# Patient Record
Sex: MALE | Race: WHITE | NOT HISPANIC OR LATINO | Employment: FULL TIME | URBAN - METROPOLITAN AREA
[De-identification: names, ages, dates, MRNs, and addresses within clinical notes are randomized per-mention and may not be internally consistent; named-entity substitution may affect disease eponyms.]

---

## 2017-01-05 ENCOUNTER — HISTORICAL (OUTPATIENT)
Dept: LAB | Facility: HOSPITAL | Age: 52
End: 2017-01-05

## 2017-02-02 ENCOUNTER — HISTORICAL (OUTPATIENT)
Dept: LAB | Facility: HOSPITAL | Age: 52
End: 2017-02-02

## 2017-02-09 ENCOUNTER — HISTORICAL (OUTPATIENT)
Dept: LAB | Facility: HOSPITAL | Age: 52
End: 2017-02-09

## 2017-03-02 ENCOUNTER — HISTORICAL (OUTPATIENT)
Dept: LAB | Facility: HOSPITAL | Age: 52
End: 2017-03-02

## 2017-04-06 ENCOUNTER — HISTORICAL (OUTPATIENT)
Dept: LAB | Facility: HOSPITAL | Age: 52
End: 2017-04-06

## 2017-05-04 ENCOUNTER — HISTORICAL (OUTPATIENT)
Dept: LAB | Facility: HOSPITAL | Age: 52
End: 2017-05-04

## 2017-05-04 LAB
INR PPP: 2.14
PROTHROMBIN TIME: 21.8 SECOND(S) (ref 9–11.5)

## 2017-06-01 ENCOUNTER — HISTORICAL (OUTPATIENT)
Dept: LAB | Facility: HOSPITAL | Age: 52
End: 2017-06-01

## 2017-06-01 LAB
INR PPP: 2.45
PROTHROMBIN TIME: 24.9 SECOND(S) (ref 9–11.5)

## 2017-07-06 ENCOUNTER — HISTORICAL (OUTPATIENT)
Dept: LAB | Facility: HOSPITAL | Age: 52
End: 2017-07-06

## 2017-07-06 LAB
INR PPP: 2.29
PROTHROMBIN TIME: 23.3 SECOND(S) (ref 9–11.5)

## 2017-08-03 ENCOUNTER — HISTORICAL (OUTPATIENT)
Dept: LAB | Facility: HOSPITAL | Age: 52
End: 2017-08-03

## 2017-08-03 LAB
INR PPP: 2.68
PROTHROMBIN TIME: 27.4 SECOND(S) (ref 9–11.5)

## 2017-09-07 ENCOUNTER — HISTORICAL (OUTPATIENT)
Dept: LAB | Facility: HOSPITAL | Age: 52
End: 2017-09-07

## 2017-09-07 LAB
INR PPP: 2.59
PROTHROMBIN TIME: 26.6 SECOND(S) (ref 9–11.5)

## 2017-10-05 ENCOUNTER — HISTORICAL (OUTPATIENT)
Dept: LAB | Facility: HOSPITAL | Age: 52
End: 2017-10-05

## 2017-10-05 LAB
INR PPP: 3.21
PROTHROMBIN TIME: 33.1 SECOND(S) (ref 9–11.5)

## 2017-11-02 ENCOUNTER — HISTORICAL (OUTPATIENT)
Dept: LAB | Facility: HOSPITAL | Age: 52
End: 2017-11-02

## 2017-11-02 LAB
INR PPP: 2.54
PROTHROMBIN TIME: 26.1 SECOND(S) (ref 9–11.5)

## 2017-12-07 ENCOUNTER — HISTORICAL (OUTPATIENT)
Dept: LAB | Facility: HOSPITAL | Age: 52
End: 2017-12-07

## 2017-12-07 LAB
INR PPP: 2.4
PROTHROMBIN TIME: 24.2 SECOND(S) (ref 9–11.5)

## 2018-01-04 ENCOUNTER — HISTORICAL (OUTPATIENT)
Dept: LAB | Facility: HOSPITAL | Age: 53
End: 2018-01-04

## 2018-01-04 LAB
INR PPP: 2.82
PROTHROMBIN TIME: 28.5 SECOND(S) (ref 9–11.5)

## 2018-02-01 ENCOUNTER — HISTORICAL (OUTPATIENT)
Dept: LAB | Facility: HOSPITAL | Age: 53
End: 2018-02-01

## 2018-02-01 LAB
INR PPP: 2.66
PROTHROMBIN TIME: 26.9 SECOND(S) (ref 9–11.5)

## 2018-03-01 ENCOUNTER — HISTORICAL (OUTPATIENT)
Dept: LAB | Facility: HOSPITAL | Age: 53
End: 2018-03-01

## 2018-03-01 LAB
INR PPP: 1.65
PROTHROMBIN TIME: 16.7 SECOND(S) (ref 9–11.5)

## 2018-03-15 ENCOUNTER — HISTORICAL (OUTPATIENT)
Dept: LAB | Facility: HOSPITAL | Age: 53
End: 2018-03-15

## 2018-03-15 LAB
INR PPP: 2.6
PROTHROMBIN TIME: 26.3 SECOND(S) (ref 9–11.5)

## 2018-04-19 ENCOUNTER — HISTORICAL (OUTPATIENT)
Dept: LAB | Facility: HOSPITAL | Age: 53
End: 2018-04-19

## 2018-04-19 LAB
INR PPP: 2.22
PROTHROMBIN TIME: 22.4 SECOND(S) (ref 9.3–11.4)

## 2018-05-17 ENCOUNTER — HISTORICAL (OUTPATIENT)
Dept: LAB | Facility: HOSPITAL | Age: 53
End: 2018-05-17

## 2018-05-17 LAB
INR PPP: 2.6
PROTHROMBIN TIME: 26.7 SECOND(S) (ref 9.3–11.4)

## 2018-06-21 ENCOUNTER — HISTORICAL (OUTPATIENT)
Dept: LAB | Facility: HOSPITAL | Age: 53
End: 2018-06-21

## 2018-06-21 LAB
INR PPP: 2.4
PROTHROMBIN TIME: 24.6 SECOND(S) (ref 9.3–11.4)

## 2018-07-09 ENCOUNTER — HISTORICAL (OUTPATIENT)
Dept: LAB | Facility: HOSPITAL | Age: 53
End: 2018-07-09

## 2018-07-09 LAB
ABS NEUT (OLG): 3.93
ALBUMIN SERPL-MCNC: 3.9 GM/DL (ref 3.4–5)
ALBUMIN/GLOB SERPL: 1 RATIO (ref 1.1–2)
ALP SERPL-CCNC: 77 UNIT/L (ref 46–116)
ALT SERPL-CCNC: 42 UNIT/L (ref 12–78)
AST SERPL-CCNC: 32 UNIT/L (ref 10–37)
BASOPHILS # BLD AUTO: 0.07 X10(3)/MCL
BASOPHILS NFR BLD AUTO: 0.9 %
BILIRUB SERPL-MCNC: 0.4 MG/DL (ref 0.2–1)
BILIRUBIN DIRECT+TOT PNL SERPL-MCNC: 0.14 MG/DL (ref 0–0.2)
BILIRUBIN DIRECT+TOT PNL SERPL-MCNC: 0.26 MG/DL
BUN SERPL-MCNC: 19 MG/DL (ref 7–18)
CALCIUM SERPL-MCNC: 9 MG/DL (ref 8.5–10.1)
CHLORIDE SERPL-SCNC: 107 MMOL/L (ref 98–107)
CHOLEST SERPL-MCNC: 167 MG/DL (ref 50–200)
CHOLEST/HDLC SERPL: 4 {RATIO} (ref 0–5)
CO2 SERPL-SCNC: 24.5 MMOL/L (ref 21–32)
CREAT SERPL-MCNC: 0.9 MG/DL (ref 0.7–1.3)
EOSINOPHIL # BLD AUTO: 0.38 X10(3)/MCL
EOSINOPHIL NFR BLD AUTO: 4.9 %
ERYTHROCYTE [DISTWIDTH] IN BLOOD BY AUTOMATED COUNT: 13 %
GLOBULIN SER-MCNC: 4.1 GM/DL (ref 2.4–3.5)
GLUCOSE SERPL-MCNC: 106 MG/DL (ref 74–106)
HCT VFR BLD AUTO: 46.3 % (ref 39–49)
HDLC SERPL-MCNC: 43 MG/DL (ref 35–60)
HGB BLD-MCNC: 16 GM/DL (ref 12.6–16.6)
IMM GRANULOCYTES # BLD AUTO: 0.01 10*3/UL (ref 0–0.1)
IMM GRANULOCYTES NFR BLD AUTO: 0.1 % (ref 0–1)
LDLC SERPL CALC-MCNC: 106 MG/DL (ref 50–140)
LYMPHOCYTES # BLD AUTO: 2.64 X10(3)/MCL
LYMPHOCYTES NFR BLD AUTO: 34.1 %
MCH RBC QN AUTO: 32.2 PG (ref 27–33)
MCHC RBC AUTO-ENTMCNC: 34.6 GM/DL (ref 32–35)
MCV RBC AUTO: 93.2 FL (ref 84–97)
MONOCYTES # BLD AUTO: 0.72 X10(3)/MCL
MONOCYTES NFR BLD AUTO: 9.3 %
NEUTROPHILS # BLD AUTO: 3.93 X10(3)/MCL
NEUTROPHILS NFR BLD AUTO: 50.7 %
PLATELET # BLD AUTO: 220 X10(3)/MCL (ref 151–368)
PMV BLD AUTO: 9 FL
POTASSIUM SERPL-SCNC: 4.6 MMOL/L (ref 3.5–5.1)
PROT SERPL-MCNC: 8 GM/DL (ref 6.4–8.2)
PSA SERPL-MCNC: 0.49 NG/ML (ref 0–4)
RBC # BLD AUTO: 4.97 X10(6)/MCL (ref 4.3–5.6)
SODIUM SERPL-SCNC: 142 MMOL/L (ref 136–145)
TRIGL SERPL-MCNC: 91 MG/DL (ref 30–150)
TSH SERPL-ACNC: 2.5 MIU/ML (ref 0.35–3.75)
VLDLC SERPL CALC-MCNC: 18 MG/DL
WBC # SPEC AUTO: 7.75 X10(3)/MCL (ref 3.4–9.2)

## 2018-07-19 ENCOUNTER — HISTORICAL (OUTPATIENT)
Dept: LAB | Facility: HOSPITAL | Age: 53
End: 2018-07-19

## 2018-07-19 LAB
INR PPP: 2.3
PROTHROMBIN TIME: 23.5 SECOND(S) (ref 9.3–11.4)

## 2018-08-16 ENCOUNTER — HISTORICAL (OUTPATIENT)
Dept: LAB | Facility: HOSPITAL | Age: 53
End: 2018-08-16

## 2018-08-16 LAB
INR PPP: 2.4
PROTHROMBIN TIME: 24.7 SECOND(S) (ref 9.3–11.4)

## 2018-09-20 ENCOUNTER — HISTORICAL (OUTPATIENT)
Dept: LAB | Facility: HOSPITAL | Age: 53
End: 2018-09-20

## 2018-09-20 LAB
INR PPP: 1.9
PROTHROMBIN TIME: 19.5 SECOND(S) (ref 9.3–11.4)

## 2018-09-27 ENCOUNTER — HISTORICAL (OUTPATIENT)
Dept: LAB | Facility: HOSPITAL | Age: 53
End: 2018-09-27

## 2018-09-27 LAB
INR PPP: 1.9
PROTHROMBIN TIME: 19.5 SECOND(S) (ref 9.3–11.4)

## 2018-10-11 ENCOUNTER — HISTORICAL (OUTPATIENT)
Dept: LAB | Facility: HOSPITAL | Age: 53
End: 2018-10-11

## 2018-10-11 LAB
INR PPP: 2.2
PROTHROMBIN TIME: 22.3 SECOND(S) (ref 9.3–11.4)

## 2018-11-15 ENCOUNTER — HISTORICAL (OUTPATIENT)
Dept: LAB | Facility: HOSPITAL | Age: 53
End: 2018-11-15

## 2018-11-15 LAB
INR PPP: 2.5
PROTHROMBIN TIME: 22.8 SECOND(S) (ref 8.6–10.1)

## 2018-12-20 ENCOUNTER — HISTORICAL (OUTPATIENT)
Dept: LAB | Facility: HOSPITAL | Age: 53
End: 2018-12-20

## 2018-12-20 LAB
INR PPP: 2.6
PROTHROMBIN TIME: 23.7 SECOND(S) (ref 8.6–10.1)

## 2019-01-17 ENCOUNTER — HISTORICAL (OUTPATIENT)
Dept: LAB | Facility: HOSPITAL | Age: 54
End: 2019-01-17

## 2019-01-17 LAB
INR PPP: 2.5
PROTHROMBIN TIME: 22.6 SECOND(S) (ref 8.6–10.1)

## 2019-02-21 ENCOUNTER — HISTORICAL (OUTPATIENT)
Dept: LAB | Facility: HOSPITAL | Age: 54
End: 2019-02-21

## 2019-02-21 LAB
INR PPP: 2.2
PROTHROMBIN TIME: 20 SECOND(S) (ref 8.6–10.1)

## 2019-03-21 ENCOUNTER — HISTORICAL (OUTPATIENT)
Dept: LAB | Facility: HOSPITAL | Age: 54
End: 2019-03-21

## 2019-03-21 LAB
INR PPP: 2
PROTHROMBIN TIME: 18.5 SECOND(S) (ref 8.6–10.1)

## 2019-04-18 ENCOUNTER — HISTORICAL (OUTPATIENT)
Dept: LAB | Facility: HOSPITAL | Age: 54
End: 2019-04-18

## 2019-04-18 LAB
INR PPP: 1.9
PROTHROMBIN TIME: 17.7 SECOND(S) (ref 8.6–10.1)

## 2019-05-02 ENCOUNTER — HISTORICAL (OUTPATIENT)
Dept: LAB | Facility: HOSPITAL | Age: 54
End: 2019-05-02

## 2019-05-02 LAB
INR PPP: 2.2
PROTHROMBIN TIME: 20.2 SECOND(S) (ref 8.6–10.1)

## 2019-06-06 ENCOUNTER — HISTORICAL (OUTPATIENT)
Dept: LAB | Facility: HOSPITAL | Age: 54
End: 2019-06-06

## 2019-06-06 LAB
INR PPP: 1.9
PROTHROMBIN TIME: 17.2 SECOND(S) (ref 8.6–10.1)

## 2019-06-27 ENCOUNTER — HISTORICAL (OUTPATIENT)
Dept: LAB | Facility: HOSPITAL | Age: 54
End: 2019-06-27

## 2019-06-27 LAB
INR PPP: 2.4
PROTHROMBIN TIME: 21.8 SECOND(S) (ref 8.6–10.1)

## 2019-07-25 ENCOUNTER — HISTORICAL (OUTPATIENT)
Dept: LAB | Facility: HOSPITAL | Age: 54
End: 2019-07-25

## 2019-07-25 LAB
INR PPP: 1.9
PROTHROMBIN TIME: 17.8 SECOND(S) (ref 8.6–10.1)

## 2019-08-12 ENCOUNTER — HISTORICAL (OUTPATIENT)
Dept: LAB | Facility: HOSPITAL | Age: 54
End: 2019-08-12

## 2019-08-12 LAB
INR PPP: 2.9
PROTHROMBIN TIME: 26.2 SECOND(S) (ref 8.6–10.1)

## 2019-09-09 ENCOUNTER — HISTORICAL (OUTPATIENT)
Dept: LAB | Facility: HOSPITAL | Age: 54
End: 2019-09-09

## 2019-09-09 LAB
INR PPP: 2.4
PROTHROMBIN TIME: 21.7 SECOND(S) (ref 8.6–10.1)

## 2019-10-04 ENCOUNTER — HISTORICAL (OUTPATIENT)
Dept: ADMINISTRATIVE | Facility: HOSPITAL | Age: 54
End: 2019-10-04

## 2019-10-14 ENCOUNTER — HISTORICAL (OUTPATIENT)
Dept: LAB | Facility: HOSPITAL | Age: 54
End: 2019-10-14

## 2019-10-14 LAB
INR PPP: 3.2
PROTHROMBIN TIME: 28.9 SECOND(S) (ref 8.6–10.1)

## 2019-10-25 ENCOUNTER — HISTORICAL (OUTPATIENT)
Dept: ADMINISTRATIVE | Facility: HOSPITAL | Age: 54
End: 2019-10-25

## 2019-11-12 ENCOUNTER — HISTORICAL (OUTPATIENT)
Dept: LAB | Facility: HOSPITAL | Age: 54
End: 2019-11-12

## 2019-11-12 LAB
INR PPP: 2
PROTHROMBIN TIME: 20 SECOND(S) (ref 8.6–10.1)

## 2019-12-10 ENCOUNTER — HISTORICAL (OUTPATIENT)
Dept: LAB | Facility: HOSPITAL | Age: 54
End: 2019-12-10

## 2019-12-10 LAB
INR PPP: 2.3
PROTHROMBIN TIME: 23.3 SECOND(S) (ref 8.6–10.1)

## 2020-01-13 ENCOUNTER — HISTORICAL (OUTPATIENT)
Dept: LAB | Facility: HOSPITAL | Age: 55
End: 2020-01-13

## 2020-01-13 LAB
INR PPP: 2.2
PROTHROMBIN TIME: 21.8 SECOND(S) (ref 8.6–10.1)

## 2020-02-10 ENCOUNTER — HISTORICAL (OUTPATIENT)
Dept: LAB | Facility: HOSPITAL | Age: 55
End: 2020-02-10

## 2020-02-10 LAB
INR PPP: 2.5
PROTHROMBIN TIME: 25.6 SECOND(S) (ref 8.6–10.1)

## 2020-03-09 ENCOUNTER — HISTORICAL (OUTPATIENT)
Dept: LAB | Facility: HOSPITAL | Age: 55
End: 2020-03-09

## 2020-03-09 LAB
INR PPP: 2
PROTHROMBIN TIME: 20.7 SECOND(S) (ref 8.6–10.1)

## 2020-04-13 ENCOUNTER — HISTORICAL (OUTPATIENT)
Dept: LAB | Facility: HOSPITAL | Age: 55
End: 2020-04-13

## 2020-04-13 LAB
INR PPP: 1.9
PROTHROMBIN TIME: 19.2 SECOND(S) (ref 8.6–10.1)

## 2020-04-27 ENCOUNTER — HISTORICAL (OUTPATIENT)
Dept: LAB | Facility: HOSPITAL | Age: 55
End: 2020-04-27

## 2020-04-27 LAB
INR PPP: 2.6
PROTHROMBIN TIME: 26.7 SECOND(S) (ref 8.6–10.1)

## 2020-05-25 ENCOUNTER — HISTORICAL (OUTPATIENT)
Dept: LAB | Facility: HOSPITAL | Age: 55
End: 2020-05-25

## 2020-05-25 LAB
INR PPP: 2
PROTHROMBIN TIME: 20.2 SECOND(S) (ref 8.6–10.1)

## 2020-06-06 LAB — NONINV COLON CA DNA+OCC BLD SCRN STL QL: NORMAL

## 2020-06-15 ENCOUNTER — HISTORICAL (OUTPATIENT)
Dept: LAB | Facility: HOSPITAL | Age: 55
End: 2020-06-15

## 2020-06-15 LAB
ABS NEUT (OLG): 3.16
ALBUMIN SERPL-MCNC: 3.9 GM/DL (ref 3.5–5)
ALBUMIN/GLOB SERPL: 1 RATIO (ref 1.1–2)
ALP SERPL-CCNC: 72 UNIT/L (ref 40–150)
ALT SERPL-CCNC: 30 UNIT/L (ref 0–55)
AST SERPL-CCNC: 29 UNIT/L (ref 5–34)
BASOPHILS # BLD AUTO: 0.05 X10(3)/MCL
BASOPHILS NFR BLD AUTO: 0.8 %
BILIRUB SERPL-MCNC: 0.5 MG/DL
BILIRUBIN DIRECT+TOT PNL SERPL-MCNC: 0.2 MG/DL (ref 0–0.5)
BILIRUBIN DIRECT+TOT PNL SERPL-MCNC: 0.3 MG/DL
BUN SERPL-MCNC: 13 MG/DL (ref 8.4–25.7)
CALCIUM SERPL-MCNC: 9.3 MG/DL (ref 8.4–10.2)
CHLORIDE SERPL-SCNC: 109 MMOL/L (ref 98–107)
CHOLEST SERPL-MCNC: 170 MG/DL
CHOLEST/HDLC SERPL: 4 {RATIO} (ref 0–5)
CO2 SERPL-SCNC: 30 MEQ/L (ref 22–29)
CREAT SERPL-MCNC: 0.95 MG/DL (ref 0.73–1.18)
EOSINOPHIL # BLD AUTO: 0.22 X10(3)/MCL
EOSINOPHIL NFR BLD AUTO: 3.5 %
ERYTHROCYTE [DISTWIDTH] IN BLOOD BY AUTOMATED COUNT: 13 %
GLOBULIN SER-MCNC: 3.8 GM/DL (ref 2.4–3.5)
GLUCOSE SERPL-MCNC: 101 MG/DL (ref 74–100)
HCT VFR BLD AUTO: 48.4 % (ref 39–49)
HDLC SERPL-MCNC: 45 MG/DL (ref 35–60)
HGB BLD-MCNC: 16.1 GM/DL (ref 12.6–16.6)
IMM GRANULOCYTES # BLD AUTO: 0.01 10*3/UL (ref 0–0.1)
IMM GRANULOCYTES NFR BLD AUTO: 0.2 % (ref 0–1)
LDLC SERPL CALC-MCNC: 111 MG/DL (ref 50–140)
LYMPHOCYTES # BLD AUTO: 2.38 X10(3)/MCL
LYMPHOCYTES NFR BLD AUTO: 37.5 %
MCH RBC QN AUTO: 32.1 PG (ref 27–33)
MCHC RBC AUTO-ENTMCNC: 33.3 GM/DL (ref 32–35)
MCV RBC AUTO: 96.6 FL (ref 84–97)
MONOCYTES # BLD AUTO: 0.53 X10(3)/MCL
MONOCYTES NFR BLD AUTO: 8.3 %
NEUTROPHILS # BLD AUTO: 3.16 X10(3)/MCL
NEUTROPHILS NFR BLD AUTO: 49.7 %
PLATELET # BLD AUTO: 228 X10(3)/MCL (ref 140–450)
PMV BLD AUTO: 9 FL
POTASSIUM SERPL-SCNC: 5.2 MMOL/L (ref 3.5–5.1)
PROT SERPL-MCNC: 7.7 GM/DL (ref 6.4–8.3)
PSA SERPL-MCNC: 0.56 NG/ML
RBC # BLD AUTO: 5.01 X10(6)/MCL (ref 4.3–5.6)
SODIUM SERPL-SCNC: 144 MMOL/L (ref 136–145)
TRIGL SERPL-MCNC: 72 MG/DL (ref 34–140)
TSH SERPL-ACNC: 1.38 UIU/ML (ref 0.35–4.94)
VLDLC SERPL CALC-MCNC: 14 MG/DL
WBC # SPEC AUTO: 6.35 X10(3)/MCL (ref 3.4–9.2)

## 2020-06-22 ENCOUNTER — HISTORICAL (OUTPATIENT)
Dept: LAB | Facility: HOSPITAL | Age: 55
End: 2020-06-22

## 2020-06-22 LAB
INR PPP: 3.9
PROTHROMBIN TIME: 39.7 SECOND(S) (ref 8.6–10.1)

## 2020-07-06 ENCOUNTER — HISTORICAL (OUTPATIENT)
Dept: LAB | Facility: HOSPITAL | Age: 55
End: 2020-07-06

## 2020-07-06 LAB
INR PPP: 1.7
POTASSIUM SERPL-SCNC: 4.6 MMOL/L (ref 3.5–5.1)
PROTHROMBIN TIME: 17.5 SECOND(S) (ref 8.6–10.1)

## 2020-07-20 ENCOUNTER — HISTORICAL (OUTPATIENT)
Dept: LAB | Facility: HOSPITAL | Age: 55
End: 2020-07-20

## 2020-07-20 LAB
INR PPP: 2.2
PROTHROMBIN TIME: 22.6 SECOND(S) (ref 8.6–10.1)

## 2020-08-17 ENCOUNTER — HISTORICAL (OUTPATIENT)
Dept: LAB | Facility: HOSPITAL | Age: 55
End: 2020-08-17

## 2020-08-17 LAB
INR PPP: 2.5
PROTHROMBIN TIME: 25 SECOND(S) (ref 8.6–10.1)

## 2020-09-21 ENCOUNTER — HISTORICAL (OUTPATIENT)
Dept: LAB | Facility: HOSPITAL | Age: 55
End: 2020-09-21

## 2020-09-21 LAB
INR PPP: 2
PROTHROMBIN TIME: 20.6 SECOND(S) (ref 8.6–10.1)

## 2020-10-19 ENCOUNTER — HISTORICAL (OUTPATIENT)
Dept: LAB | Facility: HOSPITAL | Age: 55
End: 2020-10-19

## 2020-10-19 LAB
INR PPP: 1.8
PROTHROMBIN TIME: 18.2 SECOND(S) (ref 8.6–10.1)

## 2020-11-16 ENCOUNTER — HISTORICAL (OUTPATIENT)
Dept: LAB | Facility: HOSPITAL | Age: 55
End: 2020-11-16

## 2020-11-16 LAB
INR PPP: 2.1
PROTHROMBIN TIME: 20.7 SECOND(S) (ref 9.1–10.9)

## 2020-12-21 ENCOUNTER — HISTORICAL (OUTPATIENT)
Dept: LAB | Facility: HOSPITAL | Age: 55
End: 2020-12-21

## 2020-12-21 LAB
INR PPP: 2.4
PROTHROMBIN TIME: 23.3 SECOND(S) (ref 9.1–10.9)

## 2021-01-18 ENCOUNTER — HISTORICAL (OUTPATIENT)
Dept: LAB | Facility: HOSPITAL | Age: 56
End: 2021-01-18

## 2021-01-18 LAB
INR PPP: 2.5
PROTHROMBIN TIME: 23.9 SECOND(S) (ref 9.1–10.9)

## 2021-02-17 ENCOUNTER — HISTORICAL (OUTPATIENT)
Dept: LAB | Facility: HOSPITAL | Age: 56
End: 2021-02-17

## 2021-02-17 LAB
INR PPP: 2.3
PROTHROMBIN TIME: 22.3 SECOND(S) (ref 9.1–10.9)

## 2021-03-15 ENCOUNTER — HISTORICAL (OUTPATIENT)
Dept: LAB | Facility: HOSPITAL | Age: 56
End: 2021-03-15

## 2021-03-15 LAB
INR PPP: 2.8
PROTHROMBIN TIME: 26.5 SECOND(S) (ref 9.1–10.9)

## 2021-04-19 ENCOUNTER — HISTORICAL (OUTPATIENT)
Dept: LAB | Facility: HOSPITAL | Age: 56
End: 2021-04-19

## 2021-04-19 LAB
INR PPP: 2.5
PROTHROMBIN TIME: 24.2 SECOND(S) (ref 9.1–10.9)

## 2021-05-17 ENCOUNTER — HISTORICAL (OUTPATIENT)
Dept: LAB | Facility: HOSPITAL | Age: 56
End: 2021-05-17

## 2021-05-17 LAB
INR PPP: 2
PROTHROMBIN TIME: 19.5 SECOND(S) (ref 9.1–10.9)

## 2021-06-09 ENCOUNTER — HISTORICAL (OUTPATIENT)
Dept: INFECTIOUS DISEASES | Facility: HOSPITAL | Age: 56
End: 2021-06-09

## 2021-06-17 ENCOUNTER — HISTORICAL (OUTPATIENT)
Dept: LAB | Facility: HOSPITAL | Age: 56
End: 2021-06-17

## 2021-06-17 LAB
INR PPP: 1.5
PROTHROMBIN TIME: 14.3 SECOND(S) (ref 9.1–10.9)

## 2021-06-21 ENCOUNTER — HISTORICAL (OUTPATIENT)
Dept: LAB | Facility: HOSPITAL | Age: 56
End: 2021-06-21

## 2021-06-21 LAB
INR PPP: 3.4
PROTHROMBIN TIME: 31.9 SECOND(S) (ref 9.1–10.9)

## 2021-06-24 ENCOUNTER — HISTORICAL (OUTPATIENT)
Dept: LAB | Facility: HOSPITAL | Age: 56
End: 2021-06-24

## 2021-06-24 LAB
ABS NEUT (OLG): 3.25
ALBUMIN SERPL-MCNC: 3.8 GM/DL (ref 3.5–5)
ALBUMIN/GLOB SERPL: 0.8 RATIO (ref 1.1–2)
ALP SERPL-CCNC: 68 UNIT/L (ref 40–150)
ALT SERPL-CCNC: 30 UNIT/L (ref 0–55)
AST SERPL-CCNC: 25 UNIT/L (ref 5–34)
BASOPHILS # BLD AUTO: 0.07 X10(3)/MCL
BASOPHILS NFR BLD AUTO: 1 %
BILIRUB SERPL-MCNC: 0.7 MG/DL
BILIRUBIN DIRECT+TOT PNL SERPL-MCNC: 0.3 MG/DL (ref 0–0.5)
BILIRUBIN DIRECT+TOT PNL SERPL-MCNC: 0.4 MG/DL
BUN SERPL-MCNC: 15 MG/DL (ref 8.4–25.7)
CALCIUM SERPL-MCNC: 9.6 MG/DL (ref 8.4–10.2)
CHLORIDE SERPL-SCNC: 104 MMOL/L (ref 98–107)
CO2 SERPL-SCNC: 29 MEQ/L (ref 22–29)
CREAT SERPL-MCNC: 0.87 MG/DL (ref 0.73–1.18)
EOSINOPHIL # BLD AUTO: 0.33 X10(3)/MCL
EOSINOPHIL NFR BLD AUTO: 4.5 %
ERYTHROCYTE [DISTWIDTH] IN BLOOD BY AUTOMATED COUNT: 13 %
GLOBULIN SER-MCNC: 5 GM/DL (ref 2.4–3.5)
GLUCOSE SERPL-MCNC: 92 MG/DL (ref 74–100)
HCT VFR BLD AUTO: 45.4 % (ref 39–49)
HGB BLD-MCNC: 15.2 GM/DL (ref 12.6–16.6)
IMM GRANULOCYTES # BLD AUTO: 0.02 10*3/UL (ref 0–0.1)
IMM GRANULOCYTES NFR BLD AUTO: 0.3 % (ref 0–1)
INR PPP: 3.4
LYMPHOCYTES # BLD AUTO: 2.87 X10(3)/MCL
LYMPHOCYTES NFR BLD AUTO: 39.5 %
MCH RBC QN AUTO: 31.6 PG (ref 27–33)
MCHC RBC AUTO-ENTMCNC: 33.5 GM/DL (ref 32–35)
MCV RBC AUTO: 94.4 FL (ref 84–97)
MONOCYTES # BLD AUTO: 0.73 X10(3)/MCL
MONOCYTES NFR BLD AUTO: 10 %
NEUTROPHILS # BLD AUTO: 3.25 X10(3)/MCL
NEUTROPHILS NFR BLD AUTO: 44.7 %
PLATELET # BLD AUTO: 325 X10(3)/MCL (ref 140–450)
PMV BLD AUTO: 9 FL
POTASSIUM SERPL-SCNC: 4.4 MMOL/L (ref 3.5–5.1)
PROT SERPL-MCNC: 8.8 GM/DL (ref 6.4–8.3)
PROTHROMBIN TIME: 31.8 SECOND(S) (ref 9.1–10.9)
RBC # BLD AUTO: 4.81 X10(6)/MCL (ref 4.3–5.6)
SODIUM SERPL-SCNC: 142 MMOL/L (ref 136–145)
TSH SERPL-ACNC: 2.56 UIU/ML (ref 0.35–4.94)
WBC # SPEC AUTO: 7.27 X10(3)/MCL (ref 3.4–9.2)

## 2021-06-30 LAB
FINAL CULTURE: NORMAL
FINAL CULTURE: NORMAL

## 2021-07-07 ENCOUNTER — HISTORICAL (OUTPATIENT)
Dept: LAB | Facility: HOSPITAL | Age: 56
End: 2021-07-07

## 2021-07-07 LAB
INR PPP: 4.1
PROTHROMBIN TIME: 38.1 SECOND(S) (ref 9.1–10.9)

## 2021-07-14 ENCOUNTER — HISTORICAL (OUTPATIENT)
Dept: LAB | Facility: HOSPITAL | Age: 56
End: 2021-07-14

## 2021-07-14 LAB
INR PPP: 2.4
PROTHROMBIN TIME: 23.4 SECOND(S) (ref 9.1–10.9)

## 2021-08-16 ENCOUNTER — HISTORICAL (OUTPATIENT)
Dept: LAB | Facility: HOSPITAL | Age: 56
End: 2021-08-16

## 2021-08-16 LAB
INR PPP: 2.5
PROTHROMBIN TIME: 23.6 SECOND(S) (ref 9.1–10.9)

## 2021-09-20 ENCOUNTER — HISTORICAL (OUTPATIENT)
Dept: LAB | Facility: HOSPITAL | Age: 56
End: 2021-09-20

## 2021-09-20 LAB
INR PPP: 1.7
PROTHROMBIN TIME: 16.7 SECOND(S) (ref 9.1–10.9)

## 2021-09-27 ENCOUNTER — HISTORICAL (OUTPATIENT)
Dept: LAB | Facility: HOSPITAL | Age: 56
End: 2021-09-27

## 2021-09-27 LAB
INR PPP: 2.1
PROTHROMBIN TIME: 20.2 SECOND(S) (ref 9.1–10.9)

## 2021-10-18 ENCOUNTER — HISTORICAL (OUTPATIENT)
Dept: LAB | Facility: HOSPITAL | Age: 56
End: 2021-10-18

## 2021-10-18 LAB
INR PPP: 2.5
PROTHROMBIN TIME: 24 SECOND(S) (ref 9.1–10.9)

## 2021-10-22 ENCOUNTER — HISTORICAL (OUTPATIENT)
Dept: LAB | Facility: HOSPITAL | Age: 56
End: 2021-10-22

## 2021-10-22 LAB
ABS NEUT (OLG): 3.24
ALBUMIN SERPL-MCNC: 4 GM/DL (ref 3.5–5)
ALBUMIN/GLOB SERPL: 1 RATIO (ref 1.1–2)
ALP SERPL-CCNC: 79 UNIT/L (ref 40–150)
ALT SERPL-CCNC: 38 UNIT/L (ref 0–55)
AST SERPL-CCNC: 34 UNIT/L (ref 5–34)
BASOPHILS # BLD AUTO: 0.09 X10(3)/MCL
BASOPHILS NFR BLD AUTO: 1.3 %
BILIRUB SERPL-MCNC: 0.6 MG/DL
BILIRUBIN DIRECT+TOT PNL SERPL-MCNC: 0.2 MG/DL (ref 0–0.5)
BILIRUBIN DIRECT+TOT PNL SERPL-MCNC: 0.4 MG/DL
BUN SERPL-MCNC: 20 MG/DL (ref 8.4–25.7)
CALCIUM SERPL-MCNC: 9.6 MG/DL (ref 8.4–10.2)
CHLORIDE SERPL-SCNC: 105 MMOL/L (ref 98–107)
CO2 SERPL-SCNC: 27 MEQ/L (ref 22–29)
CREAT SERPL-MCNC: 0.95 MG/DL (ref 0.73–1.18)
EOSINOPHIL # BLD AUTO: 0.31 X10(3)/MCL
EOSINOPHIL NFR BLD AUTO: 4.4 %
ERYTHROCYTE [DISTWIDTH] IN BLOOD BY AUTOMATED COUNT: 12 %
GLOBULIN SER-MCNC: 4 GM/DL (ref 2.4–3.5)
GLUCOSE SERPL-MCNC: 92 MG/DL (ref 74–100)
HCT VFR BLD AUTO: 47.9 % (ref 39–49)
HGB BLD-MCNC: 15.8 GM/DL (ref 12.6–16.6)
IMM GRANULOCYTES # BLD AUTO: 0.01 10*3/UL (ref 0–0.1)
IMM GRANULOCYTES NFR BLD AUTO: 0.1 % (ref 0–1)
LYMPHOCYTES # BLD AUTO: 2.7 X10(3)/MCL
LYMPHOCYTES NFR BLD AUTO: 38.6 %
MCH RBC QN AUTO: 31 PG (ref 27–33)
MCHC RBC AUTO-ENTMCNC: 33 GM/DL (ref 32–35)
MCV RBC AUTO: 93.9 FL (ref 84–97)
MONOCYTES # BLD AUTO: 0.64 X10(3)/MCL
MONOCYTES NFR BLD AUTO: 9.2 %
NEUTROPHILS # BLD AUTO: 3.24 X10(3)/MCL
NEUTROPHILS NFR BLD AUTO: 46.4 %
PLATELET # BLD AUTO: 214 X10(3)/MCL (ref 140–450)
PMV BLD AUTO: 9 FL
POTASSIUM SERPL-SCNC: 4.4 MMOL/L (ref 3.5–5.1)
PROT SERPL-MCNC: 8 GM/DL (ref 6.4–8.3)
RBC # BLD AUTO: 5.1 X10(6)/MCL (ref 4.3–5.6)
SODIUM SERPL-SCNC: 141 MMOL/L (ref 136–145)
TSH SERPL-ACNC: 1.93 UIU/ML (ref 0.35–4.94)
WBC # SPEC AUTO: 6.99 X10(3)/MCL (ref 3.4–9.2)

## 2021-11-15 ENCOUNTER — HISTORICAL (OUTPATIENT)
Dept: LAB | Facility: HOSPITAL | Age: 56
End: 2021-11-15

## 2021-11-15 LAB
INR PPP: 1.6
PROTHROMBIN TIME: 15.5 SECOND(S) (ref 9.1–10.9)

## 2021-11-29 ENCOUNTER — HISTORICAL (OUTPATIENT)
Dept: LAB | Facility: HOSPITAL | Age: 56
End: 2021-11-29

## 2021-11-29 LAB
INR PPP: 3.3
PROTHROMBIN TIME: 31.7 SECOND(S) (ref 9.1–10.9)

## 2021-12-20 ENCOUNTER — HISTORICAL (OUTPATIENT)
Dept: LAB | Facility: HOSPITAL | Age: 56
End: 2021-12-20

## 2021-12-20 LAB
INR PPP: 2.2
PROTHROMBIN TIME: 21.3 SECOND(S) (ref 9.1–10.9)

## 2022-01-18 ENCOUNTER — HISTORICAL (OUTPATIENT)
Dept: LAB | Facility: HOSPITAL | Age: 57
End: 2022-01-18

## 2022-01-18 LAB
INR PPP: 1.6
PROTHROMBIN TIME: 15.8 SECOND(S) (ref 9.1–10.9)

## 2022-01-31 ENCOUNTER — HISTORICAL (OUTPATIENT)
Dept: LAB | Facility: HOSPITAL | Age: 57
End: 2022-01-31

## 2022-01-31 LAB
INR PPP: 2
PROTHROMBIN TIME: 20 S (ref 9.1–10.9)

## 2022-02-21 ENCOUNTER — HISTORICAL (OUTPATIENT)
Dept: LAB | Facility: HOSPITAL | Age: 57
End: 2022-02-21

## 2022-02-21 LAB
INR PPP: 2.1
PROTHROMBIN TIME: 20.3 S (ref 9.1–10.9)

## 2022-03-21 ENCOUNTER — HISTORICAL (OUTPATIENT)
Dept: LAB | Facility: HOSPITAL | Age: 57
End: 2022-03-21

## 2022-03-21 LAB
INR PPP: 2.4
PROTHROMBIN TIME: 23 S (ref 9.1–10.9)

## 2022-04-18 ENCOUNTER — HISTORICAL (OUTPATIENT)
Dept: LAB | Facility: HOSPITAL | Age: 57
End: 2022-04-18
Payer: COMMERCIAL

## 2022-04-18 LAB
INR PPP: 1.8
PROTHROMBIN TIME: 17.8 S (ref 9.1–10.9)

## 2022-04-30 NOTE — OP NOTE
Patient:   Lennox Epps            MRN: [id number]            FIN: [id number]               Age:   53 years     Sex:  Male     :  1965   Associated Diagnoses:   None   Author:   Corwin Rivera MD      Preoperative Diagnosis: Cataract Right eye    Postoperative Diagnosis: Cataract Right eye    Procedure: Phacoemulsification with intaocular lens implantations Right eye    Surgeon: Corwin Rivera MD    Assistant: Mary Henao, [facility]    Anestheisa: MAC    Complications: None    The patient was brought into the operating suite, where the patient was correctly identified as was the operative eye via timeout.  The patient was prepped and draped in a sterile ophthalmic fashion.  A lid speculum was placed in the operative eye and the microscope was brought into place.  A 1.0mm paracentesis was then made at (12) o'clock.  The anterior chamber was filled with Endocoat.  A (temporal) clear corneal incision was made with a 2.4 mm keratome.  A 6 mm corneal marking ring was used to betsey the cornea centered over the visual axis.  A 5.00 mm continuous curvilinear capsulorhexis was fashioned using a cystotome and microcapsular forceps.  Hydrodissection and hydrodelineation was performed with upreserved 1% Xylocaine.  The nucleus was then phacoemulsified with the Abbott phacoemulsification hand-piece with a total of (5) EFX.  The cortex was then removed with the I/A hand-piece. The lens model ([id number]) with a power of (20.5) was placed in the capsular bag.  The Helon was then removed from the eye with the I/A hand piece.  The anterior chamber was inflated and the wounds were hydrated with BSS.  The wounds were checked with Weck-Chelsea sponges and found to be watertight.  The lid speculum was removed and topical antibiotics were placed on the operative eye.  The patient was brought to PACU in good condition.

## 2022-04-30 NOTE — OP NOTE
Patient:   Lennox Epps            MRN: [id number]            FIN: [id number]               Age:   53 years     Sex:  Male     :  1965   Associated Diagnoses:   None   Author:   Corwin Rivera MD      Preoperative Diagnosis: Cataract Left eye    Postoperative Diagnosis: Cataract Left eye    Procedure: Phacoemulsification with intaocular lens implantations Left eye    Surgeon: Corwin Rivera MD    Assistant: Mary Henao, [facility]    Anestheisa: MAC    Complications: None    The patient was brought into the operating suite, where the patient was correctly identified as was the operative eye via timeout.  The patient was prepped and draped in a sterile ophthalmic fashion.  A lid speculum was placed in the operative eye and the microscope was brought into place.  A 1.0mm paracentesis was then made at (6) o'clock.  The anterior chamber was filled with Endocoat.  A (temporal) clear corneal incision was made with a 2.4 mm keratome.  A 6 mm corneal marking ring was used to betsey the cornea centered over the visual axis.  A 5.00 mm continuous curvilinear capsulorhexis was fashioned using a cystotome and microcapsular forceps.  Hydrodissection and hydrodelineation was performed with upreserved 1% Xylocaine.  The nucleus was then phacoemulsified with the Abbott phacoemulsification hand-piece with a total of (1) EFX.  The cortex was then removed with the I/A hand-piece. The lens model (ZCB00) with a power of (19.0) was placed in the capsular bag.  The Helon was then removed from the eye with the I/A hand piece.  The anterior chamber was inflated and the wounds were hydrated with BSS.  The wounds were checked with Weck-Chelsea sponges and found to be watertight.  The lid speculum was removed and topical antibiotics were placed on the operative eye.  The patient was brought to PACU in good condition.

## 2022-05-16 ENCOUNTER — LAB VISIT (OUTPATIENT)
Dept: LAB | Facility: HOSPITAL | Age: 57
End: 2022-05-16
Attending: FAMILY MEDICINE
Payer: COMMERCIAL

## 2022-05-16 DIAGNOSIS — Z51.81 ENCOUNTER FOR THERAPEUTIC DRUG MONITORING: Primary | ICD-10-CM

## 2022-05-16 LAB
INR BLD: 1.8 (ref 0–1.3)
PROTHROMBIN TIME: 17.3 SECONDS (ref 9.1–10.9)

## 2022-05-16 PROCEDURE — 36415 COLL VENOUS BLD VENIPUNCTURE: CPT

## 2022-05-16 PROCEDURE — 85610 PROTHROMBIN TIME: CPT

## 2022-06-20 ENCOUNTER — LAB VISIT (OUTPATIENT)
Dept: LAB | Facility: HOSPITAL | Age: 57
End: 2022-06-20
Attending: FAMILY MEDICINE
Payer: COMMERCIAL

## 2022-06-20 DIAGNOSIS — Z51.81 ENCOUNTER FOR THERAPEUTIC DRUG MONITORING: Primary | ICD-10-CM

## 2022-06-20 LAB
INR BLD: 2 (ref 0–1.3)
PROTHROMBIN TIME: 19.4 SECONDS (ref 9.1–10.9)

## 2022-06-20 PROCEDURE — 36415 COLL VENOUS BLD VENIPUNCTURE: CPT

## 2022-06-20 PROCEDURE — 85610 PROTHROMBIN TIME: CPT

## 2022-07-05 ENCOUNTER — LAB VISIT (OUTPATIENT)
Dept: LAB | Facility: HOSPITAL | Age: 57
End: 2022-07-05
Attending: FAMILY MEDICINE
Payer: COMMERCIAL

## 2022-07-05 DIAGNOSIS — Z51.81 ENCOUNTER FOR THERAPEUTIC DRUG MONITORING: Primary | ICD-10-CM

## 2022-07-05 LAB
INR BLD: 2.8 (ref 0–1.3)
PROTHROMBIN TIME: 26.7 SECONDS (ref 9.1–10.9)

## 2022-07-05 PROCEDURE — 36415 COLL VENOUS BLD VENIPUNCTURE: CPT

## 2022-07-05 PROCEDURE — 85610 PROTHROMBIN TIME: CPT

## 2022-07-18 ENCOUNTER — CLINICAL SUPPORT (OUTPATIENT)
Dept: RESPIRATORY THERAPY | Facility: HOSPITAL | Age: 57
End: 2022-07-18
Attending: FAMILY MEDICINE
Payer: COMMERCIAL

## 2022-07-18 ENCOUNTER — HOSPITAL ENCOUNTER (EMERGENCY)
Facility: HOSPITAL | Age: 57
Discharge: HOME | End: 2022-07-18
Attending: FAMILY MEDICINE
Payer: COMMERCIAL

## 2022-07-18 ENCOUNTER — HOSPITAL ENCOUNTER (OUTPATIENT)
Dept: RADIOLOGY | Facility: HOSPITAL | Age: 57
Discharge: HOME | End: 2022-07-18
Attending: FAMILY MEDICINE
Payer: COMMERCIAL

## 2022-07-18 VITALS
HEIGHT: 72 IN | OXYGEN SATURATION: 99 % | HEART RATE: 64 BPM | BODY MASS INDEX: 34.54 KG/M2 | SYSTOLIC BLOOD PRESSURE: 155 MMHG | RESPIRATION RATE: 20 BRPM | WEIGHT: 255 LBS | DIASTOLIC BLOOD PRESSURE: 93 MMHG | TEMPERATURE: 98 F

## 2022-07-18 DIAGNOSIS — R53.81 MALAISE AND FATIGUE: Primary | ICD-10-CM

## 2022-07-18 DIAGNOSIS — R53.81 MALAISE AND FATIGUE: ICD-10-CM

## 2022-07-18 DIAGNOSIS — H81.10 BENIGN PAROXYSMAL POSITIONAL VERTIGO, UNSPECIFIED LATERALITY: Primary | ICD-10-CM

## 2022-07-18 DIAGNOSIS — R53.83 MALAISE AND FATIGUE: ICD-10-CM

## 2022-07-18 DIAGNOSIS — R53.81 MALAISE: Primary | ICD-10-CM

## 2022-07-18 DIAGNOSIS — J01.21 ACUTE RECURRENT ETHMOIDAL SINUSITIS: ICD-10-CM

## 2022-07-18 DIAGNOSIS — R53.81 MALAISE: ICD-10-CM

## 2022-07-18 DIAGNOSIS — R53.83 MALAISE AND FATIGUE: Primary | ICD-10-CM

## 2022-07-18 LAB
APPEARANCE UR: CLEAR
BACTERIA #/AREA URNS AUTO: NORMAL /HPF
BILIRUB UR QL STRIP.AUTO: NEGATIVE MG/DL
BNP BLD-MCNC: 25.1 PG/ML
CK MB SERPL-MCNC: 3.1 NG/ML
COLOR UR AUTO: YELLOW
FLUAV AG UPPER RESP QL IA.RAPID: NOT DETECTED
FLUBV AG UPPER RESP QL IA.RAPID: NOT DETECTED
GLUCOSE UR QL STRIP.AUTO: NEGATIVE MG/DL
KETONES UR QL STRIP.AUTO: NEGATIVE MG/DL
LEUKOCYTE ESTERASE UR QL STRIP.AUTO: NEGATIVE UNIT/L
NITRITE UR QL STRIP.AUTO: NEGATIVE
PH UR STRIP.AUTO: 7 [PH]
PROT UR QL STRIP.AUTO: NEGATIVE MG/DL
RBC #/AREA URNS AUTO: NORMAL /HPF
RBC UR QL AUTO: NEGATIVE UNIT/L
SARS-COV-2 RNA RESP QL NAA+PROBE: NOT DETECTED
SP GR UR STRIP.AUTO: <=1.005
SQUAMOUS #/AREA URNS AUTO: NORMAL /HPF
UROBILINOGEN UR STRIP-ACNC: 0.2 MG/DL
WBC #/AREA URNS AUTO: NORMAL /HPF

## 2022-07-18 PROCEDURE — 71046 X-RAY EXAM CHEST 2 VIEWS: CPT | Mod: TC

## 2022-07-18 PROCEDURE — 99284 EMERGENCY DEPT VISIT MOD MDM: CPT | Mod: 25

## 2022-07-18 PROCEDURE — 93005 ELECTROCARDIOGRAM TRACING: CPT

## 2022-07-18 PROCEDURE — 36415 COLL VENOUS BLD VENIPUNCTURE: CPT | Performed by: FAMILY MEDICINE

## 2022-07-18 PROCEDURE — 99900031 HC PATIENT EDUCATION (STAT)

## 2022-07-18 PROCEDURE — 82553 CREATINE MB FRACTION: CPT | Performed by: FAMILY MEDICINE

## 2022-07-18 PROCEDURE — 36000 PLACE NEEDLE IN VEIN: CPT

## 2022-07-18 PROCEDURE — 83880 ASSAY OF NATRIURETIC PEPTIDE: CPT | Performed by: FAMILY MEDICINE

## 2022-07-18 PROCEDURE — 81001 URINALYSIS AUTO W/SCOPE: CPT | Performed by: FAMILY MEDICINE

## 2022-07-18 PROCEDURE — 87636 SARSCOV2 & INF A&B AMP PRB: CPT | Performed by: FAMILY MEDICINE

## 2022-07-18 RX ORDER — AMOXICILLIN 500 MG/1
500 CAPSULE ORAL 3 TIMES DAILY
Qty: 30 CAPSULE | Refills: 0 | Status: SHIPPED | OUTPATIENT
Start: 2022-07-18 | End: 2022-07-28

## 2022-07-18 RX ORDER — MECLIZINE HYDROCHLORIDE 25 MG/1
25 TABLET ORAL 3 TIMES DAILY PRN
Qty: 20 TABLET | Refills: 0 | Status: SHIPPED | OUTPATIENT
Start: 2022-07-18 | End: 2022-11-03

## 2022-07-18 RX ORDER — PREDNISONE 20 MG/1
TABLET ORAL
COMMUNITY
End: 2022-11-03

## 2022-07-18 RX ORDER — LEVOFLOXACIN 500 MG/1
TABLET, FILM COATED ORAL
COMMUNITY
End: 2022-11-03

## 2022-07-18 RX ORDER — PROMETHAZINE HYDROCHLORIDE AND CODEINE PHOSPHATE 6.25; 1 MG/5ML; MG/5ML
5 SOLUTION ORAL
COMMUNITY
End: 2022-11-03

## 2022-07-18 RX ORDER — WARFARIN 7.5 MG/1
TABLET ORAL
COMMUNITY
Start: 2022-06-27 | End: 2022-10-26 | Stop reason: SDUPTHER

## 2022-07-18 RX ORDER — HYDROGEN PEROXIDE 3 %
SOLUTION, NON-ORAL MISCELLANEOUS
COMMUNITY

## 2022-07-18 NOTE — Clinical Note
"Lennox "[place]"Supriya was seen and treated in our emergency department on 7/18/2022.  He may return to work on 07/20/2022.       If you have any questions or concerns, please don't hesitate to call.      Rubi Milan RN    "

## 2022-07-18 NOTE — ED PROVIDER NOTES
Encounter Date: 7/18/2022       History     Chief Complaint   Patient presents with    Dizziness    Weakness    Shortness of Breath     Sent from office EKG done outpatient and then told to come to ER for weakness and dizziness     This patient is a 56-year-old male who comes in with dizziness since May 30th.  Patient states that he thinks that he had COVID approximately 3 weeks ago.  He has continued with dizziness since then and this morning he was trying to fix a flag in his house and when he looked up, he became very dizzy and almost passed out.  He was sent by his regular doctor to do an EKG and blood work outpatient but while having the EKG done, he became very dizzy again and was sent to the ER.    The history is provided by the patient.   Dizziness  Pertinent negatives include no shortness of breath. The symptoms are aggravated by bending. Nothing relieves the symptoms. He has tried nothing for the symptoms.   Shortness of Breath  Associated symptoms include cough.     Review of patient's allergies indicates:  No Known Allergies  Past Medical History:   Diagnosis Date    GERD (gastroesophageal reflux disease)     Hypertension      No past surgical history on file.  No family history on file.  Social History     Tobacco Use    Smoking status: Never Smoker   Substance Use Topics    Alcohol use: Never    Drug use: Never     Review of Systems   Constitutional: Positive for fatigue.   HENT: Negative.    Respiratory: Positive for cough. Negative for shortness of breath.    Cardiovascular: Negative.    Endocrine: Negative.    Neurological: Positive for dizziness.   Psychiatric/Behavioral: Negative.    All other systems reviewed and are negative.      Physical Exam     Initial Vitals [07/18/22 0907]   BP Pulse Resp Temp SpO2   (!) 182/96 74 20 97.8 °F (36.6 °C) 99 %      MAP       --         Physical Exam    Nursing note and vitals reviewed.  Constitutional: He appears well-developed and well-nourished.    HENT:   Head: Normocephalic.   Eyes: Pupils are equal, round, and reactive to light.   Neck:   Normal range of motion.  Cardiovascular: Normal rate and regular rhythm.   Pulmonary/Chest: Breath sounds normal.   Abdominal: Abdomen is soft. Bowel sounds are normal.   Musculoskeletal:         General: Normal range of motion.      Cervical back: Normal range of motion.     Neurological: He is alert and oriented to person, place, and time. He has normal strength and normal reflexes. No cranial nerve deficit or sensory deficit. GCS eye subscore is 4. GCS verbal subscore is 5. GCS motor subscore is 6.   dizzines   Skin: Skin is warm and dry.   Psychiatric: He has a normal mood and affect.         ED Course   Procedures  Labs Reviewed   COVID/FLU A&B PCR - Normal   B-TYPE NATRIURETIC PEPTIDE - Normal   CK-MB - Normal   URINALYSIS, MICROSCOPIC - Normal   URINALYSIS, REFLEX TO URINE CULTURE     EKG Readings: (Independently Interpreted)   Initial Reading: No STEMI. Rhythm: Normal Sinus Rhythm. Heart Rate: 71. Ectopy: No Ectopy. Conduction: Normal. ST Segments: Non-Specific ST Segment Depression. Axis: Normal. Clinical Impression: Normal Sinus Rhythm   Pt had an  EKG prior to arrival that was WNL       Imaging Results          CT Head Without Contrast (Final result)  Result time 07/18/22 11:20:39    Final result by Aurora Luis MD (07/18/22 11:20:39)                 Impression:      Sinusitis otherwise unremarkable      Electronically signed by: Aurora Luis  Date:    07/18/2022  Time:    11:20             Narrative:    EXAMINATION:  CT HEAD WITHOUT CONTRAST    CLINICAL HISTORY:  Dizziness, persistent/recurrent, cardiac or vascular cause suspected;    TECHNIQUE:  Multiple axial images were obtained from the base of the brain to the vertex without contrast administration.  Sagittal and coronal reconstructions were performed..    COMPARISON:  None    FINDINGS:  There is no intracranial mass or lesion seen.  No  hemorrhage is seen.  No infarct is seen.  The ventricles and basilar cisterns appear normal.  Brain parenchyma appears grossly unremarkable.    Posterior fossa appears normal.  The calvarium is intact.  The paranasal sinuses shows evidence of ethmoid sphenoid and frontal sinusitis.                                 Medications - No data to display  Medical Decision Making:   Initial Assessment:   Persistent dizziness and weakness for 1 week  Differential Diagnosis:   COVID, flu, benign positional vertigo  Clinical Tests:   Lab Tests: Ordered and Reviewed  Radiological Study: Ordered and Reviewed  ED Management:  Patient presents with dizziness.  Workup was done including a CT of the head an EKG with cardiac markers.  All this was negative as was his COVID.  We decided to try the halls plate maneuver to see if it would help with this patient as we believe that he has benign positional vertigo  [place] Ovalo maneuver- patient was placed sitting on the right side of the stretcher then laid down on his right side for a minute, then laid down with his head hanging off of the stretcher for a minute, then laid on his left side with his head hanging down for a minute then sat up on the left side of the stretcher.  Patient had significant dizziness when he laid on his left side.  There was no nystagmus noted                      Clinical Impression:   Final diagnoses:  [H81.10] Benign paroxysmal positional vertigo, unspecified laterality (Primary)  [J01.21] Acute recurrent ethmoidal sinusitis                 Dillon Luna MD  07/18/22 7891

## 2022-07-18 NOTE — ED NOTES
Sent to ED by primary care after having a outpatient EKG here at the hospital. Respitory called primary care  When patient became weak during test .  Pt has been feeling bad for approximately 2 weeks with intermittent chest pain, nausea, and SOB.  Has not been tested for COVID.

## 2022-08-01 ENCOUNTER — LAB VISIT (OUTPATIENT)
Dept: LAB | Facility: HOSPITAL | Age: 57
End: 2022-08-01
Attending: FAMILY MEDICINE
Payer: COMMERCIAL

## 2022-08-01 DIAGNOSIS — Z51.81 ENCOUNTER FOR THERAPEUTIC DRUG MONITORING: Primary | ICD-10-CM

## 2022-08-01 LAB
INR BLD: 2.4 (ref 0–1.3)
PROTHROMBIN TIME: 22.8 SECONDS (ref 9.1–10.9)

## 2022-08-01 PROCEDURE — 85610 PROTHROMBIN TIME: CPT

## 2022-08-01 PROCEDURE — 36415 COLL VENOUS BLD VENIPUNCTURE: CPT

## 2022-08-15 ENCOUNTER — LAB VISIT (OUTPATIENT)
Dept: LAB | Facility: HOSPITAL | Age: 57
End: 2022-08-15
Attending: FAMILY MEDICINE
Payer: COMMERCIAL

## 2022-08-15 DIAGNOSIS — Z51.81 ENCOUNTER FOR THERAPEUTIC DRUG MONITORING: Primary | ICD-10-CM

## 2022-08-15 LAB
INR BLD: 2.6 (ref 0–1.3)
PROTHROMBIN TIME: 25.2 SECONDS (ref 9.1–10.9)

## 2022-08-15 PROCEDURE — 85610 PROTHROMBIN TIME: CPT

## 2022-08-15 PROCEDURE — 36415 COLL VENOUS BLD VENIPUNCTURE: CPT

## 2022-09-19 ENCOUNTER — LAB VISIT (OUTPATIENT)
Dept: LAB | Facility: HOSPITAL | Age: 57
End: 2022-09-19
Attending: FAMILY MEDICINE
Payer: COMMERCIAL

## 2022-09-19 DIAGNOSIS — Z51.81 ENCOUNTER FOR THERAPEUTIC DRUG MONITORING: Primary | ICD-10-CM

## 2022-09-19 LAB
INR BLD: 2.5 (ref 0–1.3)
PROTHROMBIN TIME: 23.8 SECONDS (ref 9.1–10.9)

## 2022-09-19 PROCEDURE — 85610 PROTHROMBIN TIME: CPT

## 2022-09-19 PROCEDURE — 36415 COLL VENOUS BLD VENIPUNCTURE: CPT

## 2022-10-17 ENCOUNTER — LAB VISIT (OUTPATIENT)
Dept: LAB | Facility: HOSPITAL | Age: 57
End: 2022-10-17
Attending: FAMILY MEDICINE
Payer: COMMERCIAL

## 2022-10-17 DIAGNOSIS — Z51.81 ENCOUNTER FOR THERAPEUTIC DRUG MONITORING: Primary | ICD-10-CM

## 2022-10-17 LAB
INR BLD: 2 (ref 0–1.3)
PROTHROMBIN TIME: 19.6 SECONDS (ref 9.1–10.9)

## 2022-10-17 PROCEDURE — 36415 COLL VENOUS BLD VENIPUNCTURE: CPT

## 2022-10-17 PROCEDURE — 85610 PROTHROMBIN TIME: CPT

## 2022-10-26 DIAGNOSIS — Z92.29 HX OF LONG TERM USE OF BLOOD THINNERS: Primary | ICD-10-CM

## 2022-10-26 RX ORDER — WARFARIN 7.5 MG/1
TABLET ORAL
Qty: 90 TABLET | Refills: 2 | Status: SHIPPED | OUTPATIENT
Start: 2022-10-26 | End: 2023-08-21

## 2022-11-01 ENCOUNTER — TELEPHONE (OUTPATIENT)
Dept: FAMILY MEDICINE | Facility: CLINIC | Age: 57
End: 2022-11-01
Payer: COMMERCIAL

## 2022-11-03 ENCOUNTER — OFFICE VISIT (OUTPATIENT)
Dept: FAMILY MEDICINE | Facility: CLINIC | Age: 57
End: 2022-11-03
Payer: COMMERCIAL

## 2022-11-03 VITALS
RESPIRATION RATE: 20 BRPM | DIASTOLIC BLOOD PRESSURE: 94 MMHG | BODY MASS INDEX: 35.21 KG/M2 | HEIGHT: 72 IN | SYSTOLIC BLOOD PRESSURE: 146 MMHG | HEART RATE: 86 BPM | WEIGHT: 260 LBS | TEMPERATURE: 97 F | OXYGEN SATURATION: 99 %

## 2022-11-03 DIAGNOSIS — K21.9 GASTROESOPHAGEAL REFLUX DISEASE WITHOUT ESOPHAGITIS: ICD-10-CM

## 2022-11-03 DIAGNOSIS — D68.2 FACTOR V DEFICIENCY: Primary | ICD-10-CM

## 2022-11-03 PROBLEM — I82.409 DEEP VENOUS THROMBOSIS: Status: ACTIVE | Noted: 2019-08-05

## 2022-11-03 PROCEDURE — 3077F SYST BP >= 140 MM HG: CPT | Mod: CPTII,,, | Performed by: FAMILY MEDICINE

## 2022-11-03 PROCEDURE — 1160F RVW MEDS BY RX/DR IN RCRD: CPT | Mod: CPTII,,, | Performed by: FAMILY MEDICINE

## 2022-11-03 PROCEDURE — 1160F PR REVIEW ALL MEDS BY PRESCRIBER/CLIN PHARMACIST DOCUMENTED: ICD-10-PCS | Mod: CPTII,,, | Performed by: FAMILY MEDICINE

## 2022-11-03 PROCEDURE — 1159F PR MEDICATION LIST DOCUMENTED IN MEDICAL RECORD: ICD-10-PCS | Mod: CPTII,,, | Performed by: FAMILY MEDICINE

## 2022-11-03 PROCEDURE — 1159F MED LIST DOCD IN RCRD: CPT | Mod: CPTII,,, | Performed by: FAMILY MEDICINE

## 2022-11-03 PROCEDURE — 3080F DIAST BP >= 90 MM HG: CPT | Mod: CPTII,,, | Performed by: FAMILY MEDICINE

## 2022-11-03 PROCEDURE — 3008F BODY MASS INDEX DOCD: CPT | Mod: CPTII,,, | Performed by: FAMILY MEDICINE

## 2022-11-03 PROCEDURE — 99203 PR OFFICE/OUTPT VISIT, NEW, LEVL III, 30-44 MIN: ICD-10-PCS | Mod: ,,, | Performed by: FAMILY MEDICINE

## 2022-11-03 PROCEDURE — 99203 OFFICE O/P NEW LOW 30 MIN: CPT | Mod: ,,, | Performed by: FAMILY MEDICINE

## 2022-11-03 PROCEDURE — 3077F PR MOST RECENT SYSTOLIC BLOOD PRESSURE >= 140 MM HG: ICD-10-PCS | Mod: CPTII,,, | Performed by: FAMILY MEDICINE

## 2022-11-03 PROCEDURE — 3080F PR MOST RECENT DIASTOLIC BLOOD PRESSURE >= 90 MM HG: ICD-10-PCS | Mod: CPTII,,, | Performed by: FAMILY MEDICINE

## 2022-11-03 PROCEDURE — 3008F PR BODY MASS INDEX (BMI) DOCUMENTED: ICD-10-PCS | Mod: CPTII,,, | Performed by: FAMILY MEDICINE

## 2022-11-03 RX ORDER — GABAPENTIN 300 MG/1
CAPSULE ORAL
COMMUNITY
Start: 2022-07-29 | End: 2022-11-03

## 2022-11-03 NOTE — PROGRESS NOTES
Subjective:       Patient ID: Lennox Epps is a 56 y.o. male.    Chief Complaint: Establish Care and wellness (Needs INR orders for coumadin)      HPI    Review of Systems   Constitutional: Negative.    Respiratory: Negative.     Cardiovascular: Negative.    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   Cardiovascular:      Rate and Rhythm: Normal rate and regular rhythm.      Heart sounds: Normal heart sounds.   Pulmonary:      Effort: Pulmonary effort is normal.      Breath sounds: Normal breath sounds.   Neurological:      Mental Status: He is alert.   Psychiatric:         Mood and Affect: Mood normal.         Behavior: Behavior normal.         Thought Content: Thought content normal.         Judgment: Judgment normal.           Assessment:       Problem List Items Addressed This Visit          Hematology    Factor V deficiency - Primary    Relevant Orders    Protime-INR       GI    Gastroesophageal reflux disease without esophagitis          Plan:   1. Factor V deficiency  -     Protime-INR; Standing  Continue Coumadin 7.5 mg q.day   Monitor monthly PT/INR    2. Gastroesophageal reflux disease without esophagitis  Controlled  Continue current Rx medication  Return to clinic with any concerns

## 2022-11-21 ENCOUNTER — LAB VISIT (OUTPATIENT)
Dept: LAB | Facility: HOSPITAL | Age: 57
End: 2022-11-21
Attending: FAMILY MEDICINE
Payer: COMMERCIAL

## 2022-11-21 DIAGNOSIS — D68.2 FACTOR V DEFICIENCY: ICD-10-CM

## 2022-11-21 LAB
INR BLD: 3 (ref 0–1.3)
PROTHROMBIN TIME: 28.9 SECONDS (ref 9.1–10.9)

## 2022-11-21 PROCEDURE — 85610 PROTHROMBIN TIME: CPT

## 2022-11-21 PROCEDURE — 36415 COLL VENOUS BLD VENIPUNCTURE: CPT

## 2022-11-22 ENCOUNTER — TELEPHONE (OUTPATIENT)
Dept: FAMILY MEDICINE | Facility: CLINIC | Age: 57
End: 2022-11-22
Payer: COMMERCIAL

## 2022-11-22 NOTE — TELEPHONE ENCOUNTER
----- Message from Aron Sullivan MD sent at 11/21/2022  5:59 PM CST -----  Please inform patient of lab results, which are all within acceptable ranges.

## 2022-12-19 ENCOUNTER — LAB VISIT (OUTPATIENT)
Dept: LAB | Facility: HOSPITAL | Age: 57
End: 2022-12-19
Attending: FAMILY MEDICINE
Payer: COMMERCIAL

## 2022-12-19 ENCOUNTER — TELEPHONE (OUTPATIENT)
Dept: FAMILY MEDICINE | Facility: CLINIC | Age: 57
End: 2022-12-19
Payer: COMMERCIAL

## 2022-12-19 DIAGNOSIS — D68.2 FACTOR V DEFICIENCY: ICD-10-CM

## 2022-12-19 LAB
INR BLD: 2.8 (ref 0–1.3)
PROTHROMBIN TIME: 27 SECONDS (ref 9.1–10.9)

## 2022-12-19 PROCEDURE — 85610 PROTHROMBIN TIME: CPT

## 2022-12-19 PROCEDURE — 36415 COLL VENOUS BLD VENIPUNCTURE: CPT

## 2022-12-19 NOTE — TELEPHONE ENCOUNTER
----- Message from Aron Sullivan MD sent at 12/19/2022 11:13 AM CST -----  Please inform patient of lab results, which are all within acceptable ranges.

## 2023-01-17 ENCOUNTER — LAB VISIT (OUTPATIENT)
Dept: LAB | Facility: HOSPITAL | Age: 58
End: 2023-01-17
Attending: FAMILY MEDICINE
Payer: COMMERCIAL

## 2023-01-17 DIAGNOSIS — D68.2 FACTOR V DEFICIENCY: ICD-10-CM

## 2023-01-17 LAB
INR BLD: 2.7 (ref 0–1.3)
PROTHROMBIN TIME: 26.1 SECONDS (ref 9.1–10.9)

## 2023-01-17 PROCEDURE — 85610 PROTHROMBIN TIME: CPT

## 2023-01-17 PROCEDURE — 36415 COLL VENOUS BLD VENIPUNCTURE: CPT

## 2023-02-14 ENCOUNTER — OFFICE VISIT (OUTPATIENT)
Dept: URGENT CARE | Facility: CLINIC | Age: 58
End: 2023-02-14
Payer: COMMERCIAL

## 2023-02-14 ENCOUNTER — HOSPITAL ENCOUNTER (EMERGENCY)
Facility: HOSPITAL | Age: 58
Discharge: HOME | End: 2023-02-14
Attending: FAMILY MEDICINE
Payer: COMMERCIAL

## 2023-02-14 VITALS
WEIGHT: 262 LBS | OXYGEN SATURATION: 98 % | HEIGHT: 72 IN | DIASTOLIC BLOOD PRESSURE: 122 MMHG | RESPIRATION RATE: 20 BRPM | HEART RATE: 85 BPM | SYSTOLIC BLOOD PRESSURE: 177 MMHG | TEMPERATURE: 98 F | BODY MASS INDEX: 35.49 KG/M2

## 2023-02-14 VITALS
HEART RATE: 81 BPM | TEMPERATURE: 99 F | WEIGHT: 262 LBS | BODY MASS INDEX: 35.49 KG/M2 | RESPIRATION RATE: 20 BRPM | HEIGHT: 72 IN | DIASTOLIC BLOOD PRESSURE: 94 MMHG | OXYGEN SATURATION: 95 % | SYSTOLIC BLOOD PRESSURE: 161 MMHG

## 2023-02-14 DIAGNOSIS — R10.32 ACUTE LEFT LOWER QUADRANT PAIN: ICD-10-CM

## 2023-02-14 DIAGNOSIS — R10.9 ABDOMINAL PAIN: ICD-10-CM

## 2023-02-14 DIAGNOSIS — K59.00 CONSTIPATION, UNSPECIFIED CONSTIPATION TYPE: Primary | ICD-10-CM

## 2023-02-14 DIAGNOSIS — R53.83 FATIGUE, UNSPECIFIED TYPE: Primary | ICD-10-CM

## 2023-02-14 LAB
ALBUMIN SERPL-MCNC: 4.1 G/DL (ref 3.5–5)
ALBUMIN/GLOB SERPL: 1 RATIO (ref 1.1–2)
ALP SERPL-CCNC: 85 UNIT/L (ref 40–150)
ALT SERPL-CCNC: 35 UNIT/L (ref 0–55)
AMYLASE SERPL-CCNC: 41 UNIT/L (ref 25–125)
APPEARANCE UR: CLEAR
AST SERPL-CCNC: 37 UNIT/L (ref 5–34)
BACTERIA #/AREA URNS AUTO: NORMAL /HPF
BASOPHILS # BLD AUTO: 0.1 X10(3)/MCL (ref 0–0.2)
BASOPHILS NFR BLD AUTO: 1.1 %
BILIRUB UR QL STRIP.AUTO: NEGATIVE MG/DL
BILIRUBIN DIRECT+TOT PNL SERPL-MCNC: 0.5 MG/DL
BUN SERPL-MCNC: 17 MG/DL (ref 8.4–25.7)
CALCIUM SERPL-MCNC: 9.7 MG/DL (ref 8.4–10.2)
CHLORIDE SERPL-SCNC: 109 MMOL/L (ref 98–107)
CO2 SERPL-SCNC: 23 MMOL/L (ref 22–29)
COLOR UR AUTO: YELLOW
CREAT SERPL-MCNC: 1.04 MG/DL (ref 0.73–1.18)
CTP QC/QA: YES
CTP QC/QA: YES
EOSINOPHIL # BLD AUTO: 0.42 X10(3)/MCL (ref 0–0.9)
EOSINOPHIL NFR BLD AUTO: 4.6 %
ERYTHROCYTE [DISTWIDTH] IN BLOOD BY AUTOMATED COUNT: 13.1 % (ref 11.5–17)
GFR SERPLBLD CREATININE-BSD FMLA CKD-EPI: >60 MLS/MIN/1.73/M2
GLOBULIN SER-MCNC: 4.1 GM/DL (ref 2.4–3.5)
GLUCOSE SERPL-MCNC: 94 MG/DL (ref 74–100)
GLUCOSE UR QL STRIP.AUTO: NEGATIVE MG/DL
HCT VFR BLD AUTO: 44.2 % (ref 42–52)
HGB BLD-MCNC: 15.4 GM/DL (ref 14–18)
IMM GRANULOCYTES # BLD AUTO: 0.02 X10(3)/MCL (ref 0–0.04)
IMM GRANULOCYTES NFR BLD AUTO: 0.2 %
KETONES UR QL STRIP.AUTO: NEGATIVE MG/DL
LEUKOCYTE ESTERASE UR QL STRIP.AUTO: NEGATIVE UNIT/L
LIPASE SERPL-CCNC: 33 U/L
LYMPHOCYTES # BLD AUTO: 3.67 X10(3)/MCL (ref 0.6–4.6)
LYMPHOCYTES NFR BLD AUTO: 39.9 %
MCH RBC QN AUTO: 31.8 PG
MCHC RBC AUTO-ENTMCNC: 34.8 MG/DL (ref 33–36)
MCV RBC AUTO: 91.3 FL (ref 80–94)
MONOCYTES # BLD AUTO: 0.89 X10(3)/MCL (ref 0.1–1.3)
MONOCYTES NFR BLD AUTO: 9.7 %
NEUTROPHILS # BLD AUTO: 4.1 X10(3)/MCL (ref 2.1–9.2)
NEUTROPHILS NFR BLD AUTO: 44.5 %
NITRITE UR QL STRIP.AUTO: NEGATIVE
NRBC BLD AUTO-RTO: 0 %
PH UR STRIP.AUTO: 6 [PH]
PLATELET # BLD AUTO: 238 X10(3)/MCL (ref 130–400)
PMV BLD AUTO: 8.8 FL (ref 7.4–10.4)
POC MOLECULAR INFLUENZA A AGN: NEGATIVE
POC MOLECULAR INFLUENZA B AGN: NEGATIVE
POTASSIUM SERPL-SCNC: 4.2 MMOL/L (ref 3.5–5.1)
PROT SERPL-MCNC: 8.2 GM/DL (ref 6.4–8.3)
PROT UR QL STRIP.AUTO: NEGATIVE MG/DL
RBC # BLD AUTO: 4.84 X10(6)/MCL (ref 4.7–6.1)
RBC #/AREA URNS AUTO: NORMAL /HPF
RBC UR QL AUTO: ABNORMAL UNIT/L
SARS-COV-2 RDRP RESP QL NAA+PROBE: NEGATIVE
SODIUM SERPL-SCNC: 141 MMOL/L (ref 136–145)
SP GR UR STRIP.AUTO: 1.01
SQUAMOUS #/AREA URNS AUTO: NORMAL /HPF
UROBILINOGEN UR STRIP-ACNC: 0.2 MG/DL
WBC # SPEC AUTO: 9.2 X10(3)/MCL (ref 4.5–11.5)
WBC #/AREA URNS AUTO: NORMAL /HPF

## 2023-02-14 PROCEDURE — 1160F PR REVIEW ALL MEDS BY PRESCRIBER/CLIN PHARMACIST DOCUMENTED: ICD-10-PCS | Mod: CPTII,,, | Performed by: PHYSICIAN ASSISTANT

## 2023-02-14 PROCEDURE — 3008F PR BODY MASS INDEX (BMI) DOCUMENTED: ICD-10-PCS | Mod: CPTII,,, | Performed by: PHYSICIAN ASSISTANT

## 2023-02-14 PROCEDURE — 87635 SARS-COV-2 COVID-19 AMP PRB: CPT | Mod: QW,,, | Performed by: PHYSICIAN ASSISTANT

## 2023-02-14 PROCEDURE — 87502 POCT INFLUENZA A/B MOLECULAR: ICD-10-PCS | Mod: QW,,, | Performed by: PHYSICIAN ASSISTANT

## 2023-02-14 PROCEDURE — 3008F BODY MASS INDEX DOCD: CPT | Mod: CPTII,,, | Performed by: PHYSICIAN ASSISTANT

## 2023-02-14 PROCEDURE — 81001 URINALYSIS AUTO W/SCOPE: CPT | Performed by: FAMILY MEDICINE

## 2023-02-14 PROCEDURE — 87502 INFLUENZA DNA AMP PROBE: CPT | Mod: QW,,, | Performed by: PHYSICIAN ASSISTANT

## 2023-02-14 PROCEDURE — 85025 COMPLETE CBC W/AUTO DIFF WBC: CPT | Performed by: FAMILY MEDICINE

## 2023-02-14 PROCEDURE — 82150 ASSAY OF AMYLASE: CPT | Performed by: FAMILY MEDICINE

## 2023-02-14 PROCEDURE — 99203 OFFICE O/P NEW LOW 30 MIN: CPT | Mod: ,,, | Performed by: PHYSICIAN ASSISTANT

## 2023-02-14 PROCEDURE — 83690 ASSAY OF LIPASE: CPT | Performed by: FAMILY MEDICINE

## 2023-02-14 PROCEDURE — 25000003 PHARM REV CODE 250: Performed by: FAMILY MEDICINE

## 2023-02-14 PROCEDURE — 87635: ICD-10-PCS | Mod: QW,,, | Performed by: PHYSICIAN ASSISTANT

## 2023-02-14 PROCEDURE — 99203 PR OFFICE/OUTPT VISIT, NEW, LEVL III, 30-44 MIN: ICD-10-PCS | Mod: ,,, | Performed by: PHYSICIAN ASSISTANT

## 2023-02-14 PROCEDURE — 93005 ELECTROCARDIOGRAM TRACING: CPT

## 2023-02-14 PROCEDURE — 3080F DIAST BP >= 90 MM HG: CPT | Mod: CPTII,,, | Performed by: PHYSICIAN ASSISTANT

## 2023-02-14 PROCEDURE — 99285 EMERGENCY DEPT VISIT HI MDM: CPT | Mod: 25

## 2023-02-14 PROCEDURE — 3077F SYST BP >= 140 MM HG: CPT | Mod: CPTII,,, | Performed by: PHYSICIAN ASSISTANT

## 2023-02-14 PROCEDURE — 80053 COMPREHEN METABOLIC PANEL: CPT | Performed by: FAMILY MEDICINE

## 2023-02-14 PROCEDURE — 1159F PR MEDICATION LIST DOCUMENTED IN MEDICAL RECORD: ICD-10-PCS | Mod: CPTII,,, | Performed by: PHYSICIAN ASSISTANT

## 2023-02-14 PROCEDURE — 1159F MED LIST DOCD IN RCRD: CPT | Mod: CPTII,,, | Performed by: PHYSICIAN ASSISTANT

## 2023-02-14 PROCEDURE — 1160F RVW MEDS BY RX/DR IN RCRD: CPT | Mod: CPTII,,, | Performed by: PHYSICIAN ASSISTANT

## 2023-02-14 PROCEDURE — 3077F PR MOST RECENT SYSTOLIC BLOOD PRESSURE >= 140 MM HG: ICD-10-PCS | Mod: CPTII,,, | Performed by: PHYSICIAN ASSISTANT

## 2023-02-14 PROCEDURE — 3080F PR MOST RECENT DIASTOLIC BLOOD PRESSURE >= 90 MM HG: ICD-10-PCS | Mod: CPTII,,, | Performed by: PHYSICIAN ASSISTANT

## 2023-02-14 RX ORDER — LACTULOSE 10 G/15ML
10 SOLUTION ORAL 3 TIMES DAILY
Qty: 135 ML | Refills: 0 | Status: SHIPPED | OUTPATIENT
Start: 2023-02-14 | End: 2023-02-17

## 2023-02-14 RX ORDER — CLONIDINE HYDROCHLORIDE 0.1 MG/1
0.2 TABLET ORAL
Status: COMPLETED | OUTPATIENT
Start: 2023-02-14 | End: 2023-02-14

## 2023-02-14 RX ADMIN — CLONIDINE HYDROCHLORIDE 0.2 MG: 0.1 TABLET ORAL at 06:02

## 2023-02-14 NOTE — PROGRESS NOTES
Subjective:       Patient ID: Lennox Epps is a 57 y.o. male.    Vitals:  height is 6' (1.829 m) and weight is 118.8 kg (262 lb). His temperature is 98 °F (36.7 °C). His blood pressure is 177/122 (abnormal) and his pulse is 85. His respiration is 20 and oxygen saturation is 98%.     Chief Complaint: Fatigue (Pt symptoms started 1 week ago with bloating, anxious feeling, fatigue, and headaches starting today. )    HPI  anticoagulated male with history of GERD that patient reports over the last several weeks having abdominal bloating discomfort now in the last week worsening states having no bowel output over the past weekend followed by acute multiple nonbloody soft stool movements yesterday and today reports having worsening bloating and now subjective feverish warmth with left lower quadrant pain while at work ambulatory to urgent Care for initial evaluation.   Fatigue     Additional comments: Pt symptoms started 1 week ago with bloating,   anxious feeling, fatigue, and headaches starting today.     Fatigue  The problem has been gradually worsening. Associated symptoms include abdominal pain, fatigue, a fever and myalgias. Pertinent negatives include no chest pain, congestion, coughing, nausea, numbness, sore throat or vomiting.     Constitution: Positive for fatigue and fever.   HENT:  Negative for congestion, sinus pressure and sore throat.    Cardiovascular:  Negative for chest pain and sob on exertion.   Respiratory: Negative.  Negative for cough.    Gastrointestinal:  Positive for abdominal pain. Negative for nausea, vomiting, bright red blood in stool, dark colored stools, rectal bleeding and rectal pain.   Genitourinary:  Negative for dysuria, frequency, urgency, hematuria and pelvic pain.   Musculoskeletal:  Positive for pain, back pain and muscle ache. Negative for trauma.   Skin: Negative.  Negative for erythema.   Neurological:  Negative for numbness and tingling.   Hematologic/Lymphatic: Positive for  easy bruising/bleeding. Bruises/bleeds easily.     Objective:      Physical Exam   Constitutional: He is oriented to person, place, and time. He appears well-developed.  Non-toxic appearance. He appears ill.      Comments:Awake alert ambulatory pleasant male   obesity  HENT:   Head: Normocephalic.   Nose: No congestion.   Mouth/Throat: Mucous membranes are normal. Mucous membranes are moist.   Eyes: Conjunctivae and lids are normal.   Neck: Trachea normal. Neck supple.   Cardiovascular: Normal rate, regular rhythm, normal heart sounds and normal pulses.   Pulmonary/Chest: Effort normal and breath sounds normal. No stridor. No respiratory distress. He has no wheezes.   Abdominal: Bowel sounds are normal. He exhibits no distension and no mass. Soft. There is abdominal tenderness. There is guarding. There is no left CVA tenderness and no right CVA tenderness.      Comments: Severe left lower quadrant pain   Musculoskeletal: Normal range of motion.         General: Normal range of motion.   Neurological: no focal deficit. He is alert and oriented to person, place, and time. He has normal strength.   Skin: Skin is warm, dry, intact, not diaphoretic, not pale and no rash. No erythema   Psychiatric: His speech is normal and behavior is normal. Mood, judgment and thought content normal.   Nursing note and vitals reviewed.         Previous History      Review of patient's allergies indicates:  No Known Allergies    Past Medical History:   Diagnosis Date    DVT (deep venous thrombosis)     Factor 5 Leiden mutation, heterozygous     GERD (gastroesophageal reflux disease)     Hypertension      Current Outpatient Medications   Medication Instructions    esomeprazole (NEXIUM) 20 MG capsule Nexium 20 mg capsule,delayed release   Take 2 capsules every day by oral route.    pantoprazole (PROTONIX) 40 MG tablet TAKE 1 TABLET BY MOUTH EVERY DAY    pantoprazole sodium (PANTOPRAZOLE 40 MG/100 ML D5W INFUSION, READY TO MIX SYSTEM,) No  dose, route, or frequency recorded.    warfarin (COUMADIN) 7.5 MG tablet TAKE 1 TAB BY MOUTH EVERY DAY ON ALL DAYS EXCEPT TUES AND THURS. ON TUES AND THURSDAY, TAKE 1&1/2 TAB Strength: 7.5 mg     Past Surgical History:   Procedure Laterality Date    APPENDECTOMY      BACK SURGERY      COLONOSCOPY      KNEE ARTHROPLASTY Left     TONSILLECTOMY       Family History   Problem Relation Age of Onset    Stroke Mother     Heart disease Father     Hypertension Sister     Diabetes Sister        Social History     Tobacco Use    Smoking status: Never    Smokeless tobacco: Never   Substance Use Topics    Alcohol use: Never    Drug use: Never        Physical Exam      Vital Signs Reviewed   BP (!) 177/122   Pulse 85   Temp 98 °F (36.7 °C)   Resp 20   Ht 6' (1.829 m)   Wt 118.8 kg (262 lb)   SpO2 98%   BMI 35.53 kg/m²        Procedures    Procedures     Labs     Results for orders placed or performed in visit on 02/14/23   POCT Influenza A/B MOLECULAR   Result Value Ref Range    POC Molecular Influenza A Ag Negative Negative, Not Reported    POC Molecular Influenza B Ag Negative Negative, Not Reported     Acceptable Yes    POCT COVID-19 Rapid Screening   Result Value Ref Range    POC Rapid COVID Negative Negative     Acceptable Yes        Assessment:       1. Fatigue, unspecified type    2. Acute left lower quadrant pain            Plan:     Patient AAOx4 understands negative viral testing but concern for acute left severe lower quadrant abdominal pain and need for further evaluation.  Patient desires to travel to [place] emergency department tonight private vehicle.  Patient declines ambulance offered in clinic.     [facility] quadrant abdominal pain and fever evaluation blood work potential imaging rule out diverticulitis.  Fatigue, unspecified type  -     POCT Influenza A/B MOLECULAR  -     POCT COVID-19 Rapid  Screening    Acute left lower quadrant pain

## 2023-02-14 NOTE — PATIENT INSTRUCTIONS
Recommend go directly to emergency department tonight for further abdominal pain evaluation concern for left lower quadrant abdominal pain and fever evaluation blood work potential imaging rule out diverticulitis.

## 2023-02-15 NOTE — ED NOTES
Pt ambulated to room 5 with steady gait. Pt states he has felt bloating for around two weeks. Pt states he is no pain as of now. He went to urgent care earlier today and was told that they did not have the equipment to test him and referred him to the ER. Pt family at bedside. Pt denies needs at this time.

## 2023-02-15 NOTE — ED PROVIDER NOTES
Encounter Date: 2/14/2023       History     Chief Complaint   Patient presents with    Abdominal Pain     Lower abdominal pain, seen at urgent care and told to come to ER for further evaluations and labs.      This patient is a 57-year-old male who went to the walk-in clinic with a complaint of bloating.  Patient states that the bloating is unbearable although it is not really painful.  The reason why he was directed to the emergency room is because they could not do labs on him.  Upon arrival to the ER it is noted that patient has elevated blood pressure and his blood pressure was elevated at the walk-in clinic as well.  He is given clonidine 0.2    The history is provided by the patient.   Abdominal Pain  The other symptoms of the illness do not include fever or jaundice.   Review of patient's allergies indicates:  No Known Allergies  Past Medical History:   Diagnosis Date    DVT (deep venous thrombosis)     Factor 5 Leiden mutation, heterozygous     GERD (gastroesophageal reflux disease)     Hypertension      Past Surgical History:   Procedure Laterality Date    APPENDECTOMY      BACK SURGERY      COLONOSCOPY      KNEE ARTHROPLASTY Left     TONSILLECTOMY       Family History   Problem Relation Age of Onset    Stroke Mother     Heart disease Father     Hypertension Sister     Diabetes Sister      Social History     Tobacco Use    Smoking status: Never    Smokeless tobacco: Never   Substance Use Topics    Alcohol use: Never    Drug use: Never     Review of Systems   Constitutional: Negative.  Negative for fever.   HENT: Negative.     Respiratory: Negative.     Cardiovascular: Negative.    Gastrointestinal:  Positive for abdominal distention. Negative for jaundice.   Endocrine: Negative.    Neurological: Negative.    Psychiatric/Behavioral: Negative.     All other systems reviewed and are negative.    Physical Exam     Initial Vitals   BP Pulse Resp Temp SpO2   02/14/23 1758 02/14/23 1758 02/14/23 1758 02/14/23 1902  02/14/23 1758   (!) 149/113 79 20 98.5 °F (36.9 °C) 95 %      MAP       --                Physical Exam    Nursing note and vitals reviewed.  Constitutional: He appears well-developed and well-nourished.   HENT:   Head: Normocephalic.   Eyes: Pupils are equal, round, and reactive to light.   Neck:   Normal range of motion.  Cardiovascular:  Normal rate and regular rhythm.           Pulmonary/Chest: Breath sounds normal.   Abdominal: Abdomen is soft. Bowel sounds are normal.   Patient's belly is slightly bloated, but he denies any real pain in his belly he is here mostly for the bloating.   Musculoskeletal:         General: Normal range of motion.      Cervical back: Normal range of motion.     Neurological: He is alert and oriented to person, place, and time.   Skin: Skin is warm and dry.   Psychiatric: He has a normal mood and affect.       ED Course   Procedures  Labs Reviewed   COMPREHENSIVE METABOLIC PANEL - Abnormal; Notable for the following components:       Result Value    Chloride 109 (*)     Globulin 4.1 (*)     Albumin/Globulin Ratio 1.0 (*)     Aspartate Aminotransferase 37 (*)     All other components within normal limits   URINALYSIS, REFLEX TO URINE CULTURE - Abnormal; Notable for the following components:    Blood, UA Trace (*)     All other components within normal limits   LIPASE - Normal   AMYLASE - Normal   URINALYSIS, MICROSCOPIC - Normal   CBC W/ AUTO DIFFERENTIAL    Narrative:     The following orders were created for panel order CBC W/ AUTO DIFFERENTIAL.  Procedure                               Abnormality         Status                     ---------                               -----------         ------                     CBC with Differential[[id number]]                            Final result                 Please view results for these tests on the individual orders.   CBC WITH DIFFERENTIAL     EKG Readings: (Independently Interpreted)   Initial Reading: No STEMI. Rhythm: Normal Sinus  Rhythm. Heart Rate: 82. Ectopy: No Ectopy. Conduction: Normal. ST Segments: Normal ST Segments. Axis: Normal. Clinical Impression: Normal Sinus Rhythm     Imaging Results              X-Ray Abdomen Flat And Erect (Final result)  Result time 02/14/23 18:48:07      Final result by Aurora Luis MD (02/14/23 18:48:07)                   Impression:      No abnormality seen      Electronically signed by: Aurora Luis  Date:    02/14/2023  Time:    18:48               Narrative:    EXAMINATION:  XR ABDOMEN FLAT AND ERECT    CLINICAL HISTORY:  Unspecified abdominal pain    TECHNIQUE:  Flat and erect AP views of the abdomen were performed.    COMPARISON:  None    FINDINGS:  The bowel gas pattern is nonspecific.  No free air seen.  No free fluid is seen.  No evidence of organomegaly is seen.  No abnormal calcifications are seen.  No bony abnormality is seen.  There are postsurgical changes seen consistent with spinal fusion at L4-5                                       Medications   cloNIDine tablet 0.2 mg (0.2 mg Oral Given 2/14/23 1819)     Medical Decision Making:   Initial Assessment:   This patient is a 57-year-old male that comes here from the walk-in clinic, who directed him here to have labs done.  His complaint is that he has been bloated for the past 3 weeks and is very uncomfortable.  However, when he arrives we noticed the patient has elevated blood pressure which he had at the walk-in clinic as well he is given clonidine 0.2 mg p.o.  Differential Diagnosis:   Gas, constipation, hypertension  Clinical Tests:   Lab Tests: Ordered and Reviewed  ED Management:  Has a CBC and a CMP which appeared to be normal.  A flat and erect and is ordered on this patient and he seems to have constipation but mostly in the ascending colon.  We are waiting for his UA to come back and for his blood pressure to come down.                        Clinical Impression:   Final diagnoses:  [R10.9] Abdominal pain  [K59.00]  Constipation, unspecified constipation type (Primary)        ED Disposition Condition    Discharge Stable          ED Prescriptions       Medication Sig Dispense Start Date End Date Auth. Provider    lactulose (CHRONULAC) 20 gram/30 mL Soln Take 15 mLs (10 g total) by mouth 3 (three) times daily. for 3 days 135 mL 2/14/2023 2/17/2023 Dillon Luna MD          Follow-up Information       Follow up With Specialties Details Why Contact Info    Aron Sullivan MD Family Medicine   [address] [facility] [address]  [phone]               Dillon Luna MD  02/14/23 3743

## 2023-02-20 ENCOUNTER — LAB VISIT (OUTPATIENT)
Dept: LAB | Facility: HOSPITAL | Age: 58
End: 2023-02-20
Attending: FAMILY MEDICINE
Payer: COMMERCIAL

## 2023-02-20 DIAGNOSIS — D68.2 FACTOR V DEFICIENCY: ICD-10-CM

## 2023-02-20 LAB
INR BLD: 3.1 (ref 0–1.3)
PROTHROMBIN TIME: 30.2 SECONDS (ref 9.1–10.9)

## 2023-02-20 PROCEDURE — 85610 PROTHROMBIN TIME: CPT

## 2023-02-20 PROCEDURE — 36415 COLL VENOUS BLD VENIPUNCTURE: CPT

## 2023-02-27 ENCOUNTER — OFFICE VISIT (OUTPATIENT)
Dept: FAMILY MEDICINE | Facility: CLINIC | Age: 58
End: 2023-02-27
Payer: COMMERCIAL

## 2023-02-27 ENCOUNTER — CLINICAL SUPPORT (OUTPATIENT)
Dept: RESPIRATORY THERAPY | Facility: HOSPITAL | Age: 58
End: 2023-02-27
Attending: FAMILY MEDICINE
Payer: COMMERCIAL

## 2023-02-27 ENCOUNTER — PATIENT OUTREACH (OUTPATIENT)
Dept: ADMINISTRATIVE | Facility: HOSPITAL | Age: 58
End: 2023-02-27
Payer: COMMERCIAL

## 2023-02-27 VITALS
RESPIRATION RATE: 18 BRPM | TEMPERATURE: 97 F | HEIGHT: 72 IN | HEART RATE: 40 BPM | BODY MASS INDEX: 35.62 KG/M2 | OXYGEN SATURATION: 98 % | SYSTOLIC BLOOD PRESSURE: 152 MMHG | DIASTOLIC BLOOD PRESSURE: 82 MMHG | WEIGHT: 263 LBS

## 2023-02-27 DIAGNOSIS — F41.9 ANXIETY: ICD-10-CM

## 2023-02-27 DIAGNOSIS — K59.00 CONSTIPATION, UNSPECIFIED CONSTIPATION TYPE: Primary | ICD-10-CM

## 2023-02-27 DIAGNOSIS — R00.1 BRADYCARDIA: Primary | ICD-10-CM

## 2023-02-27 DIAGNOSIS — I10 HYPERTENSION, UNSPECIFIED TYPE: ICD-10-CM

## 2023-02-27 DIAGNOSIS — R00.1 BRADYCARDIA: ICD-10-CM

## 2023-02-27 DIAGNOSIS — K59.00 CONSTIPATION, UNSPECIFIED CONSTIPATION TYPE: ICD-10-CM

## 2023-02-27 PROBLEM — G47.33 OSA (OBSTRUCTIVE SLEEP APNEA): Status: ACTIVE | Noted: 2023-02-27

## 2023-02-27 PROCEDURE — 3077F PR MOST RECENT SYSTOLIC BLOOD PRESSURE >= 140 MM HG: ICD-10-PCS | Mod: CPTII,,, | Performed by: FAMILY MEDICINE

## 2023-02-27 PROCEDURE — 1160F PR REVIEW ALL MEDS BY PRESCRIBER/CLIN PHARMACIST DOCUMENTED: ICD-10-PCS | Mod: CPTII,,, | Performed by: FAMILY MEDICINE

## 2023-02-27 PROCEDURE — 3077F SYST BP >= 140 MM HG: CPT | Mod: CPTII,,, | Performed by: FAMILY MEDICINE

## 2023-02-27 PROCEDURE — 1160F RVW MEDS BY RX/DR IN RCRD: CPT | Mod: CPTII,,, | Performed by: FAMILY MEDICINE

## 2023-02-27 PROCEDURE — 3008F PR BODY MASS INDEX (BMI) DOCUMENTED: ICD-10-PCS | Mod: CPTII,,, | Performed by: FAMILY MEDICINE

## 2023-02-27 PROCEDURE — 99214 PR OFFICE/OUTPT VISIT, EST, LEVL IV, 30-39 MIN: ICD-10-PCS | Mod: ,,, | Performed by: FAMILY MEDICINE

## 2023-02-27 PROCEDURE — 1159F PR MEDICATION LIST DOCUMENTED IN MEDICAL RECORD: ICD-10-PCS | Mod: CPTII,,, | Performed by: FAMILY MEDICINE

## 2023-02-27 PROCEDURE — 93005 ELECTROCARDIOGRAM TRACING: CPT

## 2023-02-27 PROCEDURE — 99900031 HC PATIENT EDUCATION (STAT)

## 2023-02-27 PROCEDURE — 1159F MED LIST DOCD IN RCRD: CPT | Mod: CPTII,,, | Performed by: FAMILY MEDICINE

## 2023-02-27 PROCEDURE — 3079F DIAST BP 80-89 MM HG: CPT | Mod: CPTII,,, | Performed by: FAMILY MEDICINE

## 2023-02-27 PROCEDURE — 3079F PR MOST RECENT DIASTOLIC BLOOD PRESSURE 80-89 MM HG: ICD-10-PCS | Mod: CPTII,,, | Performed by: FAMILY MEDICINE

## 2023-02-27 PROCEDURE — 99214 OFFICE O/P EST MOD 30 MIN: CPT | Mod: ,,, | Performed by: FAMILY MEDICINE

## 2023-02-27 PROCEDURE — 3008F BODY MASS INDEX DOCD: CPT | Mod: CPTII,,, | Performed by: FAMILY MEDICINE

## 2023-02-27 RX ORDER — AMLODIPINE BESYLATE 5 MG/1
5 TABLET ORAL DAILY
Qty: 30 TABLET | Refills: 1 | Status: SHIPPED | OUTPATIENT
Start: 2023-02-27 | End: 2023-03-21

## 2023-02-27 RX ORDER — FLUOXETINE 10 MG/1
10 CAPSULE ORAL DAILY
Qty: 30 CAPSULE | Refills: 1 | Status: SHIPPED | OUTPATIENT
Start: 2023-02-27 | End: 2023-03-15 | Stop reason: SDUPTHER

## 2023-02-27 RX ORDER — POLYETHYLENE GLYCOL 3350 17 G/17G
17 POWDER, FOR SOLUTION ORAL DAILY
Qty: 30 EACH | Refills: 3 | Status: SHIPPED | OUTPATIENT
Start: 2023-02-27 | End: 2023-05-03 | Stop reason: SDUPTHER

## 2023-02-27 NOTE — PROGRESS NOTES
Subjective:       Patient ID: Lennox Epps is a 57 y.o. male.    Chief Complaint: GI Problem (/), Hypertension, and Anxiety      GI Problem  The primary symptoms include fatigue.   The illness is also significant for constipation.   Hypertension  Associated symptoms include anxiety and headaches.   Anxiety  Symptoms include dizziness.         Review of Systems   Constitutional:  Positive for fatigue.   Respiratory: Negative.     Cardiovascular: Negative.         See in ER on 2/14/2023   Gastrointestinal:  Positive for abdominal distention and constipation.   Neurological:  Positive for dizziness and headaches.   Psychiatric/Behavioral:          Anxiety: no hx of prior medication, increased irritability         Objective:      BP (!) 152/82 (BP Location: Left arm, Patient Position: Sitting, BP Method: Large (Manual))   Pulse (!) 40   Temp 97.1 °F (36.2 °C)   Resp 18   Ht 6' (1.829 m)   Wt 119.3 kg (263 lb)   SpO2 98%   BMI 35.67 kg/m²    Physical Exam  Constitutional:       General: He is not in acute distress.     Appearance: Normal appearance.   Cardiovascular:      Rate and Rhythm: Normal rate. Rhythm irregular.   Pulmonary:      Effort: Pulmonary effort is normal.      Breath sounds: Normal breath sounds.   Abdominal:      General: Abdomen is flat. Bowel sounds are normal.      Palpations: Abdomen is soft.   Neurological:      Mental Status: He is alert.   Psychiatric:         Mood and Affect: Mood normal.         Behavior: Behavior normal.         Thought Content: Thought content normal.         Judgment: Judgment normal.             Assessment:       Problem List Items Addressed This Visit    None  Visit Diagnoses       Bradycardia    -  Primary    Relevant Orders    Ambulatory referral/consult to Cardiology    EKG 12-lead    Constipation, unspecified constipation type        Relevant Medications    linaCLOtide (LINZESS) 145 mcg Cap capsule    Anxiety        Relevant Medications    FLUoxetine 10 MG  capsule    Hypertension, unspecified type        Relevant Medications    amLODIPine (NORVASC) 5 MG tablet               Plan:   1. Bradycardia  -     Ambulatory referral/consult to Cardiology; Future; Expected date: 03/06/2023  -     EKG 12-lead; Future; Expected date: 02/27/2023  EKG 2/14/2023 reviewed with patient  Check EKG   Refer patient to Dr. Lee   ER precautions   Return to clinic with any concerns    2. Constipation, unspecified constipation type  -     linaCLOtide (LINZESS) 145 mcg Cap capsule; Take 1 capsule (145 mcg total) by mouth before breakfast.  Dispense: 30 capsule; Refill: 1  Start Linzess 145 mcg q.day   Increase fluids   Monitor   Return to clinic with any concerns    3. Anxiety  -     FLUoxetine 10 MG capsule; Take 1 capsule (10 mg total) by mouth once daily.  Dispense: 30 capsule; Refill: 1  Start fluoxetine 10 mg q.day  Relaxation technique  Monitor   Return to clinic with any concerns     4. Hypertension, unspecified type  -     amLODIPine (NORVASC) 5 MG tablet; Take 1 tablet (5 mg total) by mouth once daily.  Dispense: 30 tablet; Refill: 1  Start Norvasc 5 mg q.day  Monitor BP  Return to clinic with any concerns

## 2023-02-27 NOTE — PROGRESS NOTES
Population Health. Out Reach. Reviewing patient's chart for quality metrics.  The following record(s)  below were uploaded for Health Maintenance .    [facility]

## 2023-02-28 DIAGNOSIS — K59.00 CONSTIPATION, UNSPECIFIED CONSTIPATION TYPE: ICD-10-CM

## 2023-03-15 ENCOUNTER — TELEPHONE (OUTPATIENT)
Dept: FAMILY MEDICINE | Facility: CLINIC | Age: 58
End: 2023-03-15
Payer: COMMERCIAL

## 2023-03-15 DIAGNOSIS — F41.9 ANXIETY: ICD-10-CM

## 2023-03-15 RX ORDER — FLUOXETINE HYDROCHLORIDE 20 MG/1
20 CAPSULE ORAL DAILY
Qty: 30 CAPSULE | Refills: 1 | Status: SHIPPED | OUTPATIENT
Start: 2023-03-15 | End: 2023-04-04 | Stop reason: SDUPTHER

## 2023-03-15 NOTE — TELEPHONE ENCOUNTER
----- Message from Aron Sullivan MD sent at 3/15/2023  4:08 PM CDT -----  Increase fluoxetine to 20 mg q.day  ----- Message -----  From: Alysha Duong LPN  Sent: 3/15/2023   3:28 PM CDT  To: Aron Sullivan MD    Htn and anxiety, states medication is not working and does not want to wait until April 4th to address it

## 2023-03-16 ENCOUNTER — HOSPITAL ENCOUNTER (OUTPATIENT)
Dept: CARDIOLOGY | Facility: HOSPITAL | Age: 58
Discharge: HOME | End: 2023-03-16
Attending: INTERNAL MEDICINE
Payer: COMMERCIAL

## 2023-03-16 DIAGNOSIS — R00.2 PALPITATIONS: Primary | ICD-10-CM

## 2023-03-16 DIAGNOSIS — R00.2 PALPITATIONS: ICD-10-CM

## 2023-03-16 PROCEDURE — 93226 XTRNL ECG REC<48 HR SCAN A/R: CPT

## 2023-03-16 PROCEDURE — 93225 XTRNL ECG REC<48 HRS REC: CPT

## 2023-03-16 PROCEDURE — 99900031 HC PATIENT EDUCATION (STAT)

## 2023-03-20 ENCOUNTER — LAB VISIT (OUTPATIENT)
Dept: LAB | Facility: HOSPITAL | Age: 58
End: 2023-03-20
Attending: FAMILY MEDICINE
Payer: COMMERCIAL

## 2023-03-20 DIAGNOSIS — D68.2 FACTOR V DEFICIENCY: ICD-10-CM

## 2023-03-20 LAB
INR BLD: 2.4 (ref 0–1.3)
OHS CV EVENT MONITOR DAY: 0
OHS CV HOLTER LENGTH DECIMAL HOURS: 24
OHS CV HOLTER LENGTH HOURS: 24
OHS CV HOLTER LENGTH MINUTES: 0
OHS CV HOLTER SINUS AVERAGE HR: 88
OHS CV HOLTER SINUS MAX HR: 133
OHS CV HOLTER SINUS MIN HR: 60
PROTHROMBIN TIME: 23.7 SECONDS (ref 9.1–10.9)

## 2023-03-20 PROCEDURE — 85610 PROTHROMBIN TIME: CPT

## 2023-03-20 PROCEDURE — 36415 COLL VENOUS BLD VENIPUNCTURE: CPT

## 2023-04-04 ENCOUNTER — OFFICE VISIT (OUTPATIENT)
Dept: FAMILY MEDICINE | Facility: CLINIC | Age: 58
End: 2023-04-04
Payer: COMMERCIAL

## 2023-04-04 VITALS
OXYGEN SATURATION: 97 % | HEIGHT: 72 IN | RESPIRATION RATE: 18 BRPM | BODY MASS INDEX: 35.35 KG/M2 | TEMPERATURE: 97 F | SYSTOLIC BLOOD PRESSURE: 126 MMHG | WEIGHT: 261 LBS | DIASTOLIC BLOOD PRESSURE: 88 MMHG | HEART RATE: 60 BPM

## 2023-04-04 DIAGNOSIS — I10 HYPERTENSION, UNSPECIFIED TYPE: ICD-10-CM

## 2023-04-04 DIAGNOSIS — K59.00 CONSTIPATION, UNSPECIFIED CONSTIPATION TYPE: Primary | ICD-10-CM

## 2023-04-04 DIAGNOSIS — F41.9 ANXIETY: ICD-10-CM

## 2023-04-04 PROCEDURE — 3074F SYST BP LT 130 MM HG: CPT | Mod: CPTII,,, | Performed by: FAMILY MEDICINE

## 2023-04-04 PROCEDURE — 1160F RVW MEDS BY RX/DR IN RCRD: CPT | Mod: CPTII,,, | Performed by: FAMILY MEDICINE

## 2023-04-04 PROCEDURE — 3079F PR MOST RECENT DIASTOLIC BLOOD PRESSURE 80-89 MM HG: ICD-10-PCS | Mod: CPTII,,, | Performed by: FAMILY MEDICINE

## 2023-04-04 PROCEDURE — 1159F PR MEDICATION LIST DOCUMENTED IN MEDICAL RECORD: ICD-10-PCS | Mod: CPTII,,, | Performed by: FAMILY MEDICINE

## 2023-04-04 PROCEDURE — 3008F BODY MASS INDEX DOCD: CPT | Mod: CPTII,,, | Performed by: FAMILY MEDICINE

## 2023-04-04 PROCEDURE — 3008F PR BODY MASS INDEX (BMI) DOCUMENTED: ICD-10-PCS | Mod: CPTII,,, | Performed by: FAMILY MEDICINE

## 2023-04-04 PROCEDURE — 1159F MED LIST DOCD IN RCRD: CPT | Mod: CPTII,,, | Performed by: FAMILY MEDICINE

## 2023-04-04 PROCEDURE — 99214 PR OFFICE/OUTPT VISIT, EST, LEVL IV, 30-39 MIN: ICD-10-PCS | Mod: ,,, | Performed by: FAMILY MEDICINE

## 2023-04-04 PROCEDURE — 3074F PR MOST RECENT SYSTOLIC BLOOD PRESSURE < 130 MM HG: ICD-10-PCS | Mod: CPTII,,, | Performed by: FAMILY MEDICINE

## 2023-04-04 PROCEDURE — 3079F DIAST BP 80-89 MM HG: CPT | Mod: CPTII,,, | Performed by: FAMILY MEDICINE

## 2023-04-04 PROCEDURE — 1160F PR REVIEW ALL MEDS BY PRESCRIBER/CLIN PHARMACIST DOCUMENTED: ICD-10-PCS | Mod: CPTII,,, | Performed by: FAMILY MEDICINE

## 2023-04-04 PROCEDURE — 99214 OFFICE O/P EST MOD 30 MIN: CPT | Mod: ,,, | Performed by: FAMILY MEDICINE

## 2023-04-04 RX ORDER — FLUOXETINE HYDROCHLORIDE 20 MG/1
20 CAPSULE ORAL DAILY
Qty: 30 CAPSULE | Refills: 3 | Status: SHIPPED | OUTPATIENT
Start: 2023-04-04 | End: 2023-05-23

## 2023-04-04 RX ORDER — POLYETHYLENE GLYCOL 3350 17 G/17G
17 POWDER, FOR SOLUTION ORAL DAILY
COMMUNITY
End: 2023-04-04

## 2023-04-04 RX ORDER — AMLODIPINE BESYLATE 5 MG/1
5 TABLET ORAL DAILY
Qty: 30 TABLET | Refills: 3 | Status: SHIPPED | OUTPATIENT
Start: 2023-04-04 | End: 2023-06-12

## 2023-04-04 NOTE — PROGRESS NOTES
Subjective:       Patient ID: Lennox Epps is a 57 y.o. male.    Chief Complaint: 1 MONTH FOLLOW UP and Fatigue      Follow-up    Fatigue  Associated symptoms include fatigue.     Review of Systems   Constitutional:  Positive for fatigue.   Cardiovascular:         Seen by Dr. Lee, recent Holter monitor, scheduled for echo and nuclear stress test   Gastrointestinal:  Positive for constipation.   Psychiatric/Behavioral:  Behavioral problem: improved with MiraLax.         Depression:  Tolerating medication, medication working well, patient without any complaints           Objective:      /88 (BP Location: Left arm, Patient Position: Sitting, BP Method: Large (Manual))   Pulse 60   Temp 97.1 °F (36.2 °C)   Resp 18   Ht 6' (1.829 m)   Wt 118.4 kg (261 lb)   SpO2 97%   BMI 35.40 kg/m²    Physical Exam  Constitutional:       General: He is not in acute distress.     Appearance: Normal appearance.   Cardiovascular:      Rate and Rhythm: Normal rate and regular rhythm.   Pulmonary:      Effort: Pulmonary effort is normal.      Breath sounds: Normal breath sounds.   Musculoskeletal:         General: Normal range of motion.   Neurological:      Mental Status: He is alert.   Psychiatric:         Mood and Affect: Mood normal.         Behavior: Behavior normal.         Thought Content: Thought content normal.         Judgment: Judgment normal.             Assessment:       Problem List Items Addressed This Visit          Psychiatric    Anxiety    Relevant Medications    FLUoxetine 20 MG capsule       Cardiac/Vascular    Hypertension    Relevant Medications    amLODIPine (NORVASC) 5 MG tablet       GI    Constipation - Primary          Plan:   1. Constipation, unspecified constipation type  Controlled  Continue current Rx medication  Return to clinic with any concerns    2. Anxiety  -     FLUoxetine 20 MG capsule; Take 1 capsule (20 mg total) by mouth once daily.  Dispense: 30 capsule; Refill:  3  Controlled  Continue current Rx medication  Return to clinic with any concerns    3. Hypertension, unspecified type  -     amLODIPine (NORVASC) 5 MG tablet; Take 1 tablet (5 mg total) by mouth once daily.  Dispense: 30 tablet; Refill: 3  Controlled  Continue current Rx medication  Return to clinic with any concerns

## 2023-04-17 ENCOUNTER — LAB VISIT (OUTPATIENT)
Dept: LAB | Facility: HOSPITAL | Age: 58
End: 2023-04-17
Attending: FAMILY MEDICINE
Payer: COMMERCIAL

## 2023-04-17 DIAGNOSIS — D68.2 FACTOR V DEFICIENCY: ICD-10-CM

## 2023-04-17 LAB
INR BLD: 2.3 (ref 0–1.3)
PROTHROMBIN TIME: 23 SECONDS (ref 9.1–10.9)

## 2023-04-17 PROCEDURE — 36415 COLL VENOUS BLD VENIPUNCTURE: CPT

## 2023-04-17 PROCEDURE — 85610 PROTHROMBIN TIME: CPT

## 2023-04-17 NOTE — PROGRESS NOTES
Please inform patient of results.     1. Start Coumadin 9 mg q.day.  Repeat INR in 1 week    Other labwork within acceptable ranges.

## 2023-05-01 ENCOUNTER — LAB VISIT (OUTPATIENT)
Dept: LAB | Facility: HOSPITAL | Age: 58
End: 2023-05-01
Attending: FAMILY MEDICINE
Payer: COMMERCIAL

## 2023-05-01 ENCOUNTER — TELEPHONE (OUTPATIENT)
Dept: FAMILY MEDICINE | Facility: CLINIC | Age: 58
End: 2023-05-01
Payer: COMMERCIAL

## 2023-05-01 DIAGNOSIS — D68.2 FACTOR V DEFICIENCY: ICD-10-CM

## 2023-05-01 LAB
INR BLD: 2.5 (ref 0–1.3)
PROTHROMBIN TIME: 24.1 SECONDS (ref 9.1–10.9)

## 2023-05-01 PROCEDURE — 36415 COLL VENOUS BLD VENIPUNCTURE: CPT

## 2023-05-01 PROCEDURE — 85610 PROTHROMBIN TIME: CPT

## 2023-05-01 NOTE — TELEPHONE ENCOUNTER
----- Message from Aron Sullivan MD sent at 5/1/2023  2:34 PM CDT -----  Please inform patient of lab results, which are all within acceptable ranges.

## 2023-05-03 ENCOUNTER — TELEPHONE (OUTPATIENT)
Dept: FAMILY MEDICINE | Facility: CLINIC | Age: 58
End: 2023-05-03
Payer: COMMERCIAL

## 2023-05-03 DIAGNOSIS — K59.00 CONSTIPATION, UNSPECIFIED CONSTIPATION TYPE: ICD-10-CM

## 2023-05-03 RX ORDER — POLYETHYLENE GLYCOL 3350 17 G/17G
17 POWDER, FOR SOLUTION ORAL DAILY
Qty: 30 EACH | Refills: 3 | Status: SHIPPED | OUTPATIENT
Start: 2023-05-03

## 2023-05-03 NOTE — TELEPHONE ENCOUNTER
----- Message from Ana Laughlin sent at 5/3/2023  8:04 AM CDT -----  Regarding: samples  Linzess  145mg    Miralax    [phone]

## 2023-05-15 ENCOUNTER — LAB VISIT (OUTPATIENT)
Dept: LAB | Facility: HOSPITAL | Age: 58
End: 2023-05-15
Attending: FAMILY MEDICINE
Payer: COMMERCIAL

## 2023-05-15 ENCOUNTER — TELEPHONE (OUTPATIENT)
Dept: FAMILY MEDICINE | Facility: CLINIC | Age: 58
End: 2023-05-15
Payer: COMMERCIAL

## 2023-05-15 DIAGNOSIS — D68.2 FACTOR V DEFICIENCY: ICD-10-CM

## 2023-05-15 LAB
INR BLD: 3.3 (ref 0–1.3)
PROTHROMBIN TIME: 31.4 SECONDS (ref 9.1–10.9)

## 2023-05-15 PROCEDURE — 85610 PROTHROMBIN TIME: CPT

## 2023-05-15 PROCEDURE — 36415 COLL VENOUS BLD VENIPUNCTURE: CPT

## 2023-05-15 NOTE — TELEPHONE ENCOUNTER
----- Message from Aron Sullivan MD sent at 5/15/2023  2:35 PM CDT -----  Please inform patient of lab results, which are all within acceptable ranges.  INR therapeutic

## 2023-05-23 RX ORDER — FLUOXETINE HYDROCHLORIDE 40 MG/1
40 CAPSULE ORAL DAILY
Qty: 30 CAPSULE | Refills: 3 | Status: SHIPPED | OUTPATIENT
Start: 2023-05-23 | End: 2023-06-12

## 2023-06-07 DIAGNOSIS — F41.9 ANXIETY: Primary | ICD-10-CM

## 2023-06-07 RX ORDER — ARIPIPRAZOLE 2 MG/1
2 TABLET ORAL DAILY
Qty: 30 TABLET | Refills: 3 | Status: SHIPPED | OUTPATIENT
Start: 2023-06-07 | End: 2023-06-12

## 2023-06-12 DIAGNOSIS — I10 HYPERTENSION, UNSPECIFIED TYPE: ICD-10-CM

## 2023-06-12 DIAGNOSIS — F41.9 ANXIETY: ICD-10-CM

## 2023-06-12 RX ORDER — AMLODIPINE BESYLATE 5 MG/1
TABLET ORAL
Qty: 90 TABLET | Refills: 1 | Status: SHIPPED | OUTPATIENT
Start: 2023-06-12 | End: 2023-07-10

## 2023-06-12 RX ORDER — FLUOXETINE HYDROCHLORIDE 40 MG/1
CAPSULE ORAL
Qty: 90 CAPSULE | Refills: 1 | Status: SHIPPED | OUTPATIENT
Start: 2023-06-12 | End: 2024-01-22

## 2023-06-12 RX ORDER — ARIPIPRAZOLE 2 MG/1
TABLET ORAL
Qty: 90 TABLET | Refills: 1 | Status: SHIPPED | OUTPATIENT
Start: 2023-06-12 | End: 2023-06-26 | Stop reason: SDUPTHER

## 2023-06-19 ENCOUNTER — LAB VISIT (OUTPATIENT)
Dept: LAB | Facility: HOSPITAL | Age: 58
End: 2023-06-19
Attending: FAMILY MEDICINE
Payer: COMMERCIAL

## 2023-06-19 DIAGNOSIS — D68.2 FACTOR V DEFICIENCY: ICD-10-CM

## 2023-06-19 LAB
INR BLD: 2.9 (ref 0–1.3)
NONINV COLON CA DNA+OCC BLD SCRN STL QL: NEGATIVE
PROTHROMBIN TIME: 28 SECONDS (ref 9.1–10.9)

## 2023-06-19 PROCEDURE — 36415 COLL VENOUS BLD VENIPUNCTURE: CPT

## 2023-06-19 PROCEDURE — 85610 PROTHROMBIN TIME: CPT

## 2023-06-20 ENCOUNTER — LAB VISIT (OUTPATIENT)
Dept: LAB | Facility: HOSPITAL | Age: 58
End: 2023-06-20
Attending: INTERNAL MEDICINE
Payer: COMMERCIAL

## 2023-06-20 ENCOUNTER — DOCUMENTATION ONLY (OUTPATIENT)
Dept: ADMINISTRATIVE | Facility: HOSPITAL | Age: 58
End: 2023-06-20
Payer: COMMERCIAL

## 2023-06-20 DIAGNOSIS — I10 ESSENTIAL HYPERTENSION, MALIGNANT: ICD-10-CM

## 2023-06-20 DIAGNOSIS — R07.89 OTHER CHEST PAIN: Primary | ICD-10-CM

## 2023-06-20 DIAGNOSIS — R53.83 FATIGUE, UNSPECIFIED TYPE: ICD-10-CM

## 2023-06-20 DIAGNOSIS — R42 DIZZINESS AND GIDDINESS: ICD-10-CM

## 2023-06-20 LAB
ALBUMIN SERPL-MCNC: 3.8 G/DL (ref 3.5–5)
ALBUMIN/GLOB SERPL: 1.1 RATIO (ref 1.1–2)
ALP SERPL-CCNC: 82 UNIT/L (ref 40–150)
ALT SERPL-CCNC: 31 UNIT/L (ref 0–55)
AST SERPL-CCNC: 37 UNIT/L (ref 5–34)
BASOPHILS # BLD AUTO: 0.08 X10(3)/MCL
BASOPHILS NFR BLD AUTO: 1.2 %
BILIRUBIN DIRECT+TOT PNL SERPL-MCNC: 0.4 MG/DL
BUN SERPL-MCNC: 12 MG/DL (ref 8.4–25.7)
CALCIUM SERPL-MCNC: 8.8 MG/DL (ref 8.4–10.2)
CHLORIDE SERPL-SCNC: 107 MMOL/L (ref 98–107)
CO2 SERPL-SCNC: 23 MMOL/L (ref 22–29)
CREAT SERPL-MCNC: 0.79 MG/DL (ref 0.73–1.18)
EOSINOPHIL # BLD AUTO: 0.42 X10(3)/MCL (ref 0–0.9)
EOSINOPHIL NFR BLD AUTO: 6.3 %
ERYTHROCYTE [DISTWIDTH] IN BLOOD BY AUTOMATED COUNT: 13.2 % (ref 11.5–17)
GFR SERPLBLD CREATININE-BSD FMLA CKD-EPI: >60 MLS/MIN/1.73/M2
GLOBULIN SER-MCNC: 3.6 GM/DL (ref 2.4–3.5)
GLUCOSE SERPL-MCNC: 105 MG/DL (ref 74–100)
HCT VFR BLD AUTO: 41.8 % (ref 42–52)
HGB BLD-MCNC: 14.3 G/DL (ref 14–18)
IMM GRANULOCYTES # BLD AUTO: 0.01 X10(3)/MCL (ref 0–0.04)
IMM GRANULOCYTES NFR BLD AUTO: 0.1 %
LYMPHOCYTES # BLD AUTO: 2.33 X10(3)/MCL (ref 0.6–4.6)
LYMPHOCYTES NFR BLD AUTO: 34.7 %
MCH RBC QN AUTO: 31.2 PG (ref 27–31)
MCHC RBC AUTO-ENTMCNC: 34.2 G/DL (ref 33–36)
MCV RBC AUTO: 91.3 FL (ref 80–94)
MONOCYTES # BLD AUTO: 0.59 X10(3)/MCL (ref 0.1–1.3)
MONOCYTES NFR BLD AUTO: 8.8 %
NEUTROPHILS # BLD AUTO: 3.28 X10(3)/MCL (ref 2.1–9.2)
NEUTROPHILS NFR BLD AUTO: 48.9 %
NRBC BLD AUTO-RTO: 0 %
PLATELET # BLD AUTO: 220 X10(3)/MCL (ref 130–400)
PMV BLD AUTO: 8.6 FL (ref 7.4–10.4)
POTASSIUM SERPL-SCNC: 4.3 MMOL/L (ref 3.5–5.1)
PROT SERPL-MCNC: 7.4 GM/DL (ref 6.4–8.3)
RBC # BLD AUTO: 4.58 X10(6)/MCL (ref 4.7–6.1)
SODIUM SERPL-SCNC: 137 MMOL/L (ref 136–145)
WBC # SPEC AUTO: 6.71 X10(3)/MCL (ref 4.5–11.5)

## 2023-06-20 PROCEDURE — 80053 COMPREHEN METABOLIC PANEL: CPT

## 2023-06-20 PROCEDURE — 85025 COMPLETE CBC W/AUTO DIFF WBC: CPT

## 2023-06-20 PROCEDURE — 36415 COLL VENOUS BLD VENIPUNCTURE: CPT

## 2023-06-23 ENCOUNTER — TELEPHONE (OUTPATIENT)
Dept: FAMILY MEDICINE | Facility: CLINIC | Age: 58
End: 2023-06-23
Payer: COMMERCIAL

## 2023-06-23 NOTE — TELEPHONE ENCOUNTER
Patient called stating his anxiety supplement medication is not working and is wanting to have Dr Sullivan change this medication. I explained Dr Sullivan is out until Monday and that he would have to make an appt to have any medication changed. He then stated well the last two times I spoke with darrick and he just changed it he doesn't need to see me. I explained it may have been close to another appt he was just seen for the same thing that if he wasn't seen recently for this issue he would have to come in. I said to let me check when the last time you were seen for this issue and before I could even get to his appts He said never mind ill call him next week and hung up. This has become an issue with this patient not wanting to come in and wanting medication to be changed. He has done this same thing prior to this until he gets his way.

## 2023-06-26 ENCOUNTER — OFFICE VISIT (OUTPATIENT)
Dept: FAMILY MEDICINE | Facility: CLINIC | Age: 58
End: 2023-06-26
Payer: COMMERCIAL

## 2023-06-26 VITALS
RESPIRATION RATE: 16 BRPM | BODY MASS INDEX: 36.37 KG/M2 | HEART RATE: 76 BPM | TEMPERATURE: 97 F | WEIGHT: 268.5 LBS | DIASTOLIC BLOOD PRESSURE: 72 MMHG | OXYGEN SATURATION: 97 % | SYSTOLIC BLOOD PRESSURE: 132 MMHG | HEIGHT: 72 IN

## 2023-06-26 DIAGNOSIS — F33.9 DEPRESSION, RECURRENT: Primary | ICD-10-CM

## 2023-06-26 DIAGNOSIS — D68.2 FACTOR V DEFICIENCY: ICD-10-CM

## 2023-06-26 DIAGNOSIS — I82.5Z1 CHRONIC DEEP VEIN THROMBOSIS (DVT) OF DISTAL VEIN OF RIGHT LOWER EXTREMITY: ICD-10-CM

## 2023-06-26 DIAGNOSIS — E66.01 SEVERE OBESITY (BMI 35.0-39.9) WITH COMORBIDITY: ICD-10-CM

## 2023-06-26 PROCEDURE — 1160F PR REVIEW ALL MEDS BY PRESCRIBER/CLIN PHARMACIST DOCUMENTED: ICD-10-PCS | Mod: CPTII,,, | Performed by: FAMILY MEDICINE

## 2023-06-26 PROCEDURE — 3008F PR BODY MASS INDEX (BMI) DOCUMENTED: ICD-10-PCS | Mod: CPTII,,, | Performed by: FAMILY MEDICINE

## 2023-06-26 PROCEDURE — 99214 OFFICE O/P EST MOD 30 MIN: CPT | Mod: ,,, | Performed by: FAMILY MEDICINE

## 2023-06-26 PROCEDURE — 3075F PR MOST RECENT SYSTOLIC BLOOD PRESS GE 130-139MM HG: ICD-10-PCS | Mod: CPTII,,, | Performed by: FAMILY MEDICINE

## 2023-06-26 PROCEDURE — 99214 PR OFFICE/OUTPT VISIT, EST, LEVL IV, 30-39 MIN: ICD-10-PCS | Mod: ,,, | Performed by: FAMILY MEDICINE

## 2023-06-26 PROCEDURE — 1159F MED LIST DOCD IN RCRD: CPT | Mod: CPTII,,, | Performed by: FAMILY MEDICINE

## 2023-06-26 PROCEDURE — 1159F PR MEDICATION LIST DOCUMENTED IN MEDICAL RECORD: ICD-10-PCS | Mod: CPTII,,, | Performed by: FAMILY MEDICINE

## 2023-06-26 PROCEDURE — 1160F RVW MEDS BY RX/DR IN RCRD: CPT | Mod: CPTII,,, | Performed by: FAMILY MEDICINE

## 2023-06-26 PROCEDURE — 3078F PR MOST RECENT DIASTOLIC BLOOD PRESSURE < 80 MM HG: ICD-10-PCS | Mod: CPTII,,, | Performed by: FAMILY MEDICINE

## 2023-06-26 PROCEDURE — 3008F BODY MASS INDEX DOCD: CPT | Mod: CPTII,,, | Performed by: FAMILY MEDICINE

## 2023-06-26 PROCEDURE — 3078F DIAST BP <80 MM HG: CPT | Mod: CPTII,,, | Performed by: FAMILY MEDICINE

## 2023-06-26 PROCEDURE — 3075F SYST BP GE 130 - 139MM HG: CPT | Mod: CPTII,,, | Performed by: FAMILY MEDICINE

## 2023-06-26 RX ORDER — ARIPIPRAZOLE 5 MG/1
5 TABLET ORAL DAILY
Qty: 30 TABLET | Refills: 0 | Status: SHIPPED | OUTPATIENT
Start: 2023-06-26 | End: 2023-07-17

## 2023-06-26 NOTE — PROGRESS NOTES
Subjective:       Patient ID: Lennox Epps is a 57 y.o. male.    Chief Complaint: Anxiety (Panic attacks w/o change ), Hypertension (F/u pt states today when he left home 152/92), and Fatigue (Cant do anything feeling to tired for X1 week )      Anxiety    Anxiety        Hypertension  Associated symptoms include anxiety.   Fatigue  Associated symptoms include fatigue.     Hypertension  - reports elevated BP  - occ headaches  - patient scheduled for appointment with Dr Lee    Review of Systems   Constitutional:  Positive for fatigue.   Respiratory: Negative.     Cardiovascular:         Recently felled stress test, scheduled for angiogram with Dr Lee   Psychiatric/Behavioral:          Depression:  Reports medication no longer working as well         Objective:      /72   Pulse 76   Temp 97.1 °F (36.2 °C)   Resp 16   Ht 6' (1.829 m)   Wt 121.8 kg (268 lb 8 oz)   SpO2 97%   BMI 36.42 kg/m²    Physical Exam  Constitutional:       Appearance: Normal appearance.   Cardiovascular:      Rate and Rhythm: Normal rate and regular rhythm.      Heart sounds: Normal heart sounds.   Pulmonary:      Effort: Pulmonary effort is normal.      Breath sounds: Normal breath sounds.   Neurological:      Mental Status: He is alert.   Psychiatric:         Mood and Affect: Mood normal.         Behavior: Behavior normal.         Thought Content: Thought content normal.         Judgment: Judgment normal.             Assessment:       Problem List Items Addressed This Visit          Psychiatric    Depression, recurrent - Primary    Current Assessment & Plan     Reports medication no longer working as well         Relevant Medications    ARIPiprazole (ABILIFY) 5 MG Tab       Hematology    Deep venous thrombosis    Current Assessment & Plan     Currently on Coumadin         Factor V deficiency    Current Assessment & Plan     Currently not seen by hematology; seen by Dr Rice in the past  Currently on Coumadin             Endocrine    Severe obesity (BMI 35.0-39.9) with comorbidity    Current Assessment & Plan     Reduced calorie diet modification  Frequent self weighing   Exercise/lifestyle modification                 Plan:   1. Depression, recurrent  Assessment & Plan:  Continue fluoxetine 40 mg q.day  Increase Abilify to 5 mg q.day  Relaxation technique  Monitor  Return to clinic with any concerns    Orders:  -     ARIPiprazole (ABILIFY) 5 MG Tab; Take 1 tablet (5 mg total) by mouth once daily.  Dispense: 30 tablet; Refill: 0    2. Severe obesity (BMI 35.0-39.9) with comorbidity  Assessment & Plan:  Reduced calorie diet modification  Frequent self weighing   Exercise/lifestyle modification    3. Factor V deficiency  Assessment & Plan:  Currently not seen by hematology; seen by Dr Rice in the past  Currently on Coumadin    4. Chronic deep vein thrombosis (DVT) of distal vein of right lower extremity  Assessment & Plan:  Coumadin currently on hold due to procedure with Dr. Lee

## 2023-06-27 ENCOUNTER — LAB VISIT (OUTPATIENT)
Dept: LAB | Facility: HOSPITAL | Age: 58
End: 2023-06-27
Attending: FAMILY MEDICINE
Payer: COMMERCIAL

## 2023-06-27 DIAGNOSIS — D68.2 FACTOR V DEFICIENCY: ICD-10-CM

## 2023-06-27 LAB
INR BLD: 1.7 (ref 0–1.3)
PROTHROMBIN TIME: 16.6 SECONDS (ref 9.1–10.9)

## 2023-06-27 PROCEDURE — 36415 COLL VENOUS BLD VENIPUNCTURE: CPT

## 2023-06-27 PROCEDURE — 85610 PROTHROMBIN TIME: CPT

## 2023-07-10 DIAGNOSIS — I10 HYPERTENSION, UNSPECIFIED TYPE: Primary | ICD-10-CM

## 2023-07-10 RX ORDER — AMLODIPINE BESYLATE 10 MG/1
10 TABLET ORAL DAILY
Qty: 90 EACH | Refills: 3 | Status: SHIPPED | OUTPATIENT
Start: 2023-07-10 | End: 2023-08-10

## 2023-07-11 ENCOUNTER — OFFICE VISIT (OUTPATIENT)
Dept: FAMILY MEDICINE | Facility: CLINIC | Age: 58
End: 2023-07-11
Payer: COMMERCIAL

## 2023-07-11 VITALS
WEIGHT: 267.63 LBS | TEMPERATURE: 97 F | HEART RATE: 92 BPM | SYSTOLIC BLOOD PRESSURE: 156 MMHG | RESPIRATION RATE: 20 BRPM | HEIGHT: 72 IN | BODY MASS INDEX: 36.25 KG/M2 | DIASTOLIC BLOOD PRESSURE: 92 MMHG | OXYGEN SATURATION: 96 %

## 2023-07-11 DIAGNOSIS — I10 HYPERTENSION, UNSPECIFIED TYPE: Primary | ICD-10-CM

## 2023-07-11 PROCEDURE — 1160F RVW MEDS BY RX/DR IN RCRD: CPT | Mod: CPTII,,, | Performed by: FAMILY MEDICINE

## 2023-07-11 PROCEDURE — 99214 PR OFFICE/OUTPT VISIT, EST, LEVL IV, 30-39 MIN: ICD-10-PCS | Mod: ,,, | Performed by: FAMILY MEDICINE

## 2023-07-11 PROCEDURE — 3077F PR MOST RECENT SYSTOLIC BLOOD PRESSURE >= 140 MM HG: ICD-10-PCS | Mod: CPTII,,, | Performed by: FAMILY MEDICINE

## 2023-07-11 PROCEDURE — 3077F SYST BP >= 140 MM HG: CPT | Mod: CPTII,,, | Performed by: FAMILY MEDICINE

## 2023-07-11 PROCEDURE — 1160F PR REVIEW ALL MEDS BY PRESCRIBER/CLIN PHARMACIST DOCUMENTED: ICD-10-PCS | Mod: CPTII,,, | Performed by: FAMILY MEDICINE

## 2023-07-11 PROCEDURE — 99214 OFFICE O/P EST MOD 30 MIN: CPT | Mod: ,,, | Performed by: FAMILY MEDICINE

## 2023-07-11 PROCEDURE — 3080F PR MOST RECENT DIASTOLIC BLOOD PRESSURE >= 90 MM HG: ICD-10-PCS | Mod: CPTII,,, | Performed by: FAMILY MEDICINE

## 2023-07-11 PROCEDURE — 1159F PR MEDICATION LIST DOCUMENTED IN MEDICAL RECORD: ICD-10-PCS | Mod: CPTII,,, | Performed by: FAMILY MEDICINE

## 2023-07-11 PROCEDURE — 1159F MED LIST DOCD IN RCRD: CPT | Mod: CPTII,,, | Performed by: FAMILY MEDICINE

## 2023-07-11 PROCEDURE — 3008F PR BODY MASS INDEX (BMI) DOCUMENTED: ICD-10-PCS | Mod: CPTII,,, | Performed by: FAMILY MEDICINE

## 2023-07-11 PROCEDURE — 3080F DIAST BP >= 90 MM HG: CPT | Mod: CPTII,,, | Performed by: FAMILY MEDICINE

## 2023-07-11 PROCEDURE — 3008F BODY MASS INDEX DOCD: CPT | Mod: CPTII,,, | Performed by: FAMILY MEDICINE

## 2023-07-11 RX ORDER — HYDROCHLOROTHIAZIDE 12.5 MG/1
12.5 TABLET ORAL DAILY
Qty: 30 TABLET | Refills: 1 | Status: SHIPPED | OUTPATIENT
Start: 2023-07-11 | End: 2023-08-02

## 2023-07-14 ENCOUNTER — LAB VISIT (OUTPATIENT)
Dept: LAB | Facility: HOSPITAL | Age: 58
End: 2023-07-14
Attending: FAMILY MEDICINE
Payer: COMMERCIAL

## 2023-07-14 DIAGNOSIS — D68.2 FACTOR V DEFICIENCY: ICD-10-CM

## 2023-07-14 LAB
INR BLD: 2 (ref 0–1.3)
PROTHROMBIN TIME: 19.8 SECONDS (ref 9.1–10.9)

## 2023-07-14 PROCEDURE — 85610 PROTHROMBIN TIME: CPT

## 2023-07-14 PROCEDURE — 36415 COLL VENOUS BLD VENIPUNCTURE: CPT

## 2023-07-17 DIAGNOSIS — F33.9 DEPRESSION, RECURRENT: ICD-10-CM

## 2023-07-17 RX ORDER — ARIPIPRAZOLE 5 MG/1
TABLET ORAL
Qty: 30 TABLET | Refills: 0 | Status: SHIPPED | OUTPATIENT
Start: 2023-07-17 | End: 2023-07-27 | Stop reason: ALTCHOICE

## 2023-07-24 ENCOUNTER — OFFICE VISIT (OUTPATIENT)
Dept: FAMILY MEDICINE | Facility: CLINIC | Age: 58
End: 2023-07-24
Payer: COMMERCIAL

## 2023-07-24 VITALS
HEIGHT: 72 IN | TEMPERATURE: 97 F | HEART RATE: 85 BPM | RESPIRATION RATE: 18 BRPM | SYSTOLIC BLOOD PRESSURE: 136 MMHG | OXYGEN SATURATION: 96 % | WEIGHT: 272.63 LBS | DIASTOLIC BLOOD PRESSURE: 86 MMHG | BODY MASS INDEX: 36.93 KG/M2

## 2023-07-24 DIAGNOSIS — I10 HYPERTENSION, UNSPECIFIED TYPE: ICD-10-CM

## 2023-07-24 DIAGNOSIS — F41.9 ANXIETY: Primary | ICD-10-CM

## 2023-07-24 PROCEDURE — 3075F SYST BP GE 130 - 139MM HG: CPT | Mod: CPTII,,, | Performed by: FAMILY MEDICINE

## 2023-07-24 PROCEDURE — 3079F PR MOST RECENT DIASTOLIC BLOOD PRESSURE 80-89 MM HG: ICD-10-PCS | Mod: CPTII,,, | Performed by: FAMILY MEDICINE

## 2023-07-24 PROCEDURE — 1159F PR MEDICATION LIST DOCUMENTED IN MEDICAL RECORD: ICD-10-PCS | Mod: CPTII,,, | Performed by: FAMILY MEDICINE

## 2023-07-24 PROCEDURE — 3008F PR BODY MASS INDEX (BMI) DOCUMENTED: ICD-10-PCS | Mod: CPTII,,, | Performed by: FAMILY MEDICINE

## 2023-07-24 PROCEDURE — 1160F RVW MEDS BY RX/DR IN RCRD: CPT | Mod: CPTII,,, | Performed by: FAMILY MEDICINE

## 2023-07-24 PROCEDURE — 3008F BODY MASS INDEX DOCD: CPT | Mod: CPTII,,, | Performed by: FAMILY MEDICINE

## 2023-07-24 PROCEDURE — 3075F PR MOST RECENT SYSTOLIC BLOOD PRESS GE 130-139MM HG: ICD-10-PCS | Mod: CPTII,,, | Performed by: FAMILY MEDICINE

## 2023-07-24 PROCEDURE — 1159F MED LIST DOCD IN RCRD: CPT | Mod: CPTII,,, | Performed by: FAMILY MEDICINE

## 2023-07-24 PROCEDURE — 99214 OFFICE O/P EST MOD 30 MIN: CPT | Mod: ,,, | Performed by: FAMILY MEDICINE

## 2023-07-24 PROCEDURE — 99214 PR OFFICE/OUTPT VISIT, EST, LEVL IV, 30-39 MIN: ICD-10-PCS | Mod: ,,, | Performed by: FAMILY MEDICINE

## 2023-07-24 PROCEDURE — 3079F DIAST BP 80-89 MM HG: CPT | Mod: CPTII,,, | Performed by: FAMILY MEDICINE

## 2023-07-24 PROCEDURE — 1160F PR REVIEW ALL MEDS BY PRESCRIBER/CLIN PHARMACIST DOCUMENTED: ICD-10-PCS | Mod: CPTII,,, | Performed by: FAMILY MEDICINE

## 2023-07-24 NOTE — PROGRESS NOTES
Subjective:       Patient ID: Lennox Epps is a 57 y.o. male.    Chief Complaint: 2 week HTN and Anxiety      Follow-up    Anxiety        Hypertension  - tolerating medication (no side effects)  - no headaches  - patient reports BP at home:  120-130's/60-80's    Review of Systems   Constitutional: Negative.    Respiratory: Negative.     Cardiovascular: Negative.    Gastrointestinal: Negative.    Psychiatric/Behavioral: Negative.          Anxiety: recent change in medication, no improvement with medication         Objective:      /86 (BP Location: Left arm, Patient Position: Sitting, BP Method: Large (Manual))   Pulse 85   Temp 97.3 °F (36.3 °C)   Resp 18   Ht 6' (1.829 m)   Wt 123.7 kg (272 lb 9.6 oz)   SpO2 96%   BMI 36.97 kg/m²    Physical Exam  Constitutional:       Appearance: Normal appearance.   Cardiovascular:      Rate and Rhythm: Normal rate and regular rhythm.      Heart sounds: Normal heart sounds.   Pulmonary:      Effort: Pulmonary effort is normal.      Breath sounds: Normal breath sounds.   Neurological:      Mental Status: He is alert.   Psychiatric:         Mood and Affect: Mood normal.         Behavior: Behavior normal.         Thought Content: Thought content normal.         Judgment: Judgment normal.             Assessment:       Problem List Items Addressed This Visit          Psychiatric    Anxiety - Primary       Cardiac/Vascular    Hypertension          Plan:   1. Anxiety  Continue fluoxetine 40 mg q.day  Patient to fill Rx for Abilify 5 mg q.day; monitor, discussed increasing Abilify to 10 mg if needed   Return to clinic with any concerns     2. Hypertension, unspecified type  BP log reviewed with patient   Controlled   Continue current medication  Return to clinic with any concerns

## 2023-07-27 ENCOUNTER — TELEPHONE (OUTPATIENT)
Dept: FAMILY MEDICINE | Facility: CLINIC | Age: 58
End: 2023-07-27
Payer: COMMERCIAL

## 2023-07-27 DIAGNOSIS — F41.9 ANXIETY: Primary | ICD-10-CM

## 2023-07-27 RX ORDER — METOPROLOL TARTRATE 25 MG/1
12.5 TABLET, FILM COATED ORAL 2 TIMES DAILY
COMMUNITY
Start: 2023-07-24 | End: 2023-09-11

## 2023-07-27 RX ORDER — ARIPIPRAZOLE 5 MG/1
10 TABLET ORAL DAILY
Qty: 60 TABLET | Refills: 11 | Status: SHIPPED | OUTPATIENT
Start: 2023-07-27 | End: 2023-11-20 | Stop reason: SDUPTHER

## 2023-07-27 NOTE — TELEPHONE ENCOUNTER
----- Message from Ana Laughlin sent at 7/27/2023  8:13 AM CDT -----  Regarding: med change  Gladis -wife called   [phone]    She has questions about his meds

## 2023-08-01 ENCOUNTER — LAB VISIT (OUTPATIENT)
Dept: LAB | Facility: HOSPITAL | Age: 58
End: 2023-08-01
Attending: INTERNAL MEDICINE
Payer: COMMERCIAL

## 2023-08-01 DIAGNOSIS — D68.2 FACTOR V DEFICIENCY: ICD-10-CM

## 2023-08-01 DIAGNOSIS — R00.2 PALPITATIONS: ICD-10-CM

## 2023-08-01 DIAGNOSIS — R53.83 FATIGUE, UNSPECIFIED TYPE: Primary | ICD-10-CM

## 2023-08-01 DIAGNOSIS — R06.09 DYSPNEA ON EXERTION: ICD-10-CM

## 2023-08-01 DIAGNOSIS — R07.89 CHEST DISCOMFORT: ICD-10-CM

## 2023-08-01 LAB
ALBUMIN SERPL-MCNC: 3.8 G/DL (ref 3.5–5)
ALBUMIN/GLOB SERPL: 1 RATIO (ref 1.1–2)
ALP SERPL-CCNC: 79 UNIT/L (ref 40–150)
ALT SERPL-CCNC: 38 UNIT/L (ref 0–55)
AST SERPL-CCNC: 38 UNIT/L (ref 5–34)
BASOPHILS # BLD AUTO: 0.07 X10(3)/MCL
BASOPHILS NFR BLD AUTO: 1 %
BILIRUBIN DIRECT+TOT PNL SERPL-MCNC: 0.7 MG/DL
BUN SERPL-MCNC: 17 MG/DL (ref 8.4–25.7)
CALCIUM SERPL-MCNC: 9.3 MG/DL (ref 8.4–10.2)
CHLORIDE SERPL-SCNC: 103 MMOL/L (ref 98–107)
CO2 SERPL-SCNC: 25 MMOL/L (ref 22–29)
CREAT SERPL-MCNC: 0.93 MG/DL (ref 0.73–1.18)
EOSINOPHIL # BLD AUTO: 0.41 X10(3)/MCL (ref 0–0.9)
EOSINOPHIL NFR BLD AUTO: 5.9 %
ERYTHROCYTE [DISTWIDTH] IN BLOOD BY AUTOMATED COUNT: 13 % (ref 11.5–17)
GFR SERPLBLD CREATININE-BSD FMLA CKD-EPI: >60 MLS/MIN/1.73/M2
GLOBULIN SER-MCNC: 3.9 GM/DL (ref 2.4–3.5)
GLUCOSE SERPL-MCNC: 105 MG/DL (ref 74–100)
HCT VFR BLD AUTO: 41.9 % (ref 42–52)
HGB BLD-MCNC: 14.5 G/DL (ref 14–18)
IMM GRANULOCYTES # BLD AUTO: 0.04 X10(3)/MCL (ref 0–0.04)
IMM GRANULOCYTES NFR BLD AUTO: 0.6 %
INR PPP: 1.6
LYMPHOCYTES # BLD AUTO: 2.43 X10(3)/MCL (ref 0.6–4.6)
LYMPHOCYTES NFR BLD AUTO: 35.1 %
MCH RBC QN AUTO: 31.4 PG (ref 27–31)
MCHC RBC AUTO-ENTMCNC: 34.6 G/DL (ref 33–36)
MCV RBC AUTO: 90.7 FL (ref 80–94)
MONOCYTES # BLD AUTO: 0.68 X10(3)/MCL (ref 0.1–1.3)
MONOCYTES NFR BLD AUTO: 9.8 %
NEUTROPHILS # BLD AUTO: 3.3 X10(3)/MCL (ref 2.1–9.2)
NEUTROPHILS NFR BLD AUTO: 47.6 %
NRBC BLD AUTO-RTO: 0 %
PLATELET # BLD AUTO: 229 X10(3)/MCL (ref 130–400)
PMV BLD AUTO: 8.9 FL (ref 7.4–10.4)
POTASSIUM SERPL-SCNC: 4.3 MMOL/L (ref 3.5–5.1)
PROT SERPL-MCNC: 7.7 GM/DL (ref 6.4–8.3)
PROTHROMBIN TIME: 15.6 SECONDS (ref 9.1–10.9)
RBC # BLD AUTO: 4.62 X10(6)/MCL (ref 4.7–6.1)
SODIUM SERPL-SCNC: 136 MMOL/L (ref 136–145)
WBC # SPEC AUTO: 6.93 X10(3)/MCL (ref 4.5–11.5)

## 2023-08-01 PROCEDURE — 36415 COLL VENOUS BLD VENIPUNCTURE: CPT

## 2023-08-01 PROCEDURE — 85610 PROTHROMBIN TIME: CPT

## 2023-08-01 PROCEDURE — 80053 COMPREHEN METABOLIC PANEL: CPT

## 2023-08-01 PROCEDURE — 85025 COMPLETE CBC W/AUTO DIFF WBC: CPT

## 2023-08-02 DIAGNOSIS — I10 HYPERTENSION, UNSPECIFIED TYPE: ICD-10-CM

## 2023-08-02 RX ORDER — HYDROCHLOROTHIAZIDE 12.5 MG/1
12.5 TABLET ORAL
Qty: 30 TABLET | Refills: 1 | Status: SHIPPED | OUTPATIENT
Start: 2023-08-02 | End: 2023-08-24

## 2023-08-10 ENCOUNTER — OFFICE VISIT (OUTPATIENT)
Dept: FAMILY MEDICINE | Facility: CLINIC | Age: 58
End: 2023-08-10
Payer: COMMERCIAL

## 2023-08-10 VITALS
SYSTOLIC BLOOD PRESSURE: 116 MMHG | BODY MASS INDEX: 36.54 KG/M2 | OXYGEN SATURATION: 96 % | HEART RATE: 92 BPM | WEIGHT: 269.81 LBS | HEIGHT: 72 IN | TEMPERATURE: 97 F | DIASTOLIC BLOOD PRESSURE: 82 MMHG | RESPIRATION RATE: 20 BRPM

## 2023-08-10 DIAGNOSIS — I10 HYPERTENSION, UNSPECIFIED TYPE: Primary | ICD-10-CM

## 2023-08-10 DIAGNOSIS — F41.9 ANXIETY: Primary | ICD-10-CM

## 2023-08-10 DIAGNOSIS — K44.9 HIATAL HERNIA: ICD-10-CM

## 2023-08-10 DIAGNOSIS — K21.9 GASTROESOPHAGEAL REFLUX DISEASE, UNSPECIFIED WHETHER ESOPHAGITIS PRESENT: ICD-10-CM

## 2023-08-10 PROCEDURE — 1159F PR MEDICATION LIST DOCUMENTED IN MEDICAL RECORD: ICD-10-PCS | Mod: CPTII,,, | Performed by: FAMILY MEDICINE

## 2023-08-10 PROCEDURE — 3008F BODY MASS INDEX DOCD: CPT | Mod: CPTII,,, | Performed by: FAMILY MEDICINE

## 2023-08-10 PROCEDURE — 3079F DIAST BP 80-89 MM HG: CPT | Mod: CPTII,,, | Performed by: FAMILY MEDICINE

## 2023-08-10 PROCEDURE — 1160F RVW MEDS BY RX/DR IN RCRD: CPT | Mod: CPTII,,, | Performed by: FAMILY MEDICINE

## 2023-08-10 PROCEDURE — 1160F PR REVIEW ALL MEDS BY PRESCRIBER/CLIN PHARMACIST DOCUMENTED: ICD-10-PCS | Mod: CPTII,,, | Performed by: FAMILY MEDICINE

## 2023-08-10 PROCEDURE — 3079F PR MOST RECENT DIASTOLIC BLOOD PRESSURE 80-89 MM HG: ICD-10-PCS | Mod: CPTII,,, | Performed by: FAMILY MEDICINE

## 2023-08-10 PROCEDURE — 1159F MED LIST DOCD IN RCRD: CPT | Mod: CPTII,,, | Performed by: FAMILY MEDICINE

## 2023-08-10 PROCEDURE — 3008F PR BODY MASS INDEX (BMI) DOCUMENTED: ICD-10-PCS | Mod: CPTII,,, | Performed by: FAMILY MEDICINE

## 2023-08-10 PROCEDURE — 3074F PR MOST RECENT SYSTOLIC BLOOD PRESSURE < 130 MM HG: ICD-10-PCS | Mod: CPTII,,, | Performed by: FAMILY MEDICINE

## 2023-08-10 PROCEDURE — 99214 PR OFFICE/OUTPT VISIT, EST, LEVL IV, 30-39 MIN: ICD-10-PCS | Mod: ,,, | Performed by: FAMILY MEDICINE

## 2023-08-10 PROCEDURE — 3074F SYST BP LT 130 MM HG: CPT | Mod: CPTII,,, | Performed by: FAMILY MEDICINE

## 2023-08-10 PROCEDURE — 99214 OFFICE O/P EST MOD 30 MIN: CPT | Mod: ,,, | Performed by: FAMILY MEDICINE

## 2023-08-10 RX ORDER — LOSARTAN POTASSIUM 50 MG/1
50 TABLET ORAL DAILY
Qty: 30 TABLET | Refills: 1 | Status: SHIPPED | OUTPATIENT
Start: 2023-08-10 | End: 2023-09-01

## 2023-08-10 NOTE — PROGRESS NOTES
Subjective:       Patient ID: Lennox Epps is a 57 y.o. male.    Chief Complaint: Foot Swelling (Intermittent swelling in bilateral feet)      HPI    Review of Systems   Constitutional: Negative.    Respiratory:  Positive for shortness of breath (MI, no orthopnea). Negative for wheezing.    Cardiovascular:  Positive for leg swelling.   Gastrointestinal:  Positive for reflux.        Hiatal hernia           Objective:      /82 (BP Location: Left arm, Patient Position: Sitting, BP Method: Large (Manual))   Pulse 92   Temp 96.8 °F (36 °C)   Resp 20   Ht 6' (1.829 m)   Wt 122.4 kg (269 lb 12.8 oz)   SpO2 96%   BMI 36.59 kg/m²    Physical Exam  Constitutional:       Appearance: Normal appearance.   Cardiovascular:      Rate and Rhythm: Normal rate and regular rhythm.      Heart sounds: Normal heart sounds.      Comments: Bilateral pedal edema  Pulmonary:      Effort: Pulmonary effort is normal.      Breath sounds: Normal breath sounds.   Neurological:      Mental Status: He is alert.   Psychiatric:         Mood and Affect: Mood normal.         Behavior: Behavior normal.         Thought Content: Thought content normal.         Judgment: Judgment normal.               Assessment:       Problem List Items Addressed This Visit          Cardiac/Vascular    Hypertension - Primary    Relevant Medications    losartan (COZAAR) 50 MG tablet     Other Visit Diagnoses       Gastroesophageal reflux disease, unspecified whether esophagitis present        Relevant Orders    Ambulatory referral/consult to Gastroenterology    Hiatal hernia                   Plan:   1. Hypertension, unspecified type  -     losartan (COZAAR) 50 MG tablet; Take 1 tablet (50 mg total) by mouth once daily.  Dispense: 30 tablet; Refill: 1  Stop amlodipine   Start losartan 50 mg q.day  Monitor BP   Return to clinic with any concerns     2. Gastroesophageal reflux disease, unspecified whether esophagitis present  -     Ambulatory referral/consult to  Gastroenterology; Future; Expected date: 08/17/2023  Refer patient to Dr. George    3. Hiatal hernia

## 2023-08-18 DIAGNOSIS — Z92.29 HX OF LONG TERM USE OF BLOOD THINNERS: ICD-10-CM

## 2023-08-18 DIAGNOSIS — K21.9 GASTROESOPHAGEAL REFLUX DISEASE WITHOUT ESOPHAGITIS: ICD-10-CM

## 2023-08-21 DIAGNOSIS — I82.5Z1 CHRONIC DEEP VEIN THROMBOSIS (DVT) OF DISTAL VEIN OF RIGHT LOWER EXTREMITY: Primary | ICD-10-CM

## 2023-08-21 RX ORDER — PANTOPRAZOLE SODIUM 40 MG/1
TABLET, DELAYED RELEASE ORAL
Qty: 90 TABLET | Refills: 1 | Status: SHIPPED | OUTPATIENT
Start: 2023-08-21

## 2023-08-21 RX ORDER — WARFARIN 7.5 MG/1
TABLET ORAL
Qty: 90 TABLET | Refills: 1 | Status: SHIPPED | OUTPATIENT
Start: 2023-08-21 | End: 2024-01-26

## 2023-08-22 ENCOUNTER — LAB VISIT (OUTPATIENT)
Dept: LAB | Facility: HOSPITAL | Age: 58
End: 2023-08-22
Attending: FAMILY MEDICINE
Payer: COMMERCIAL

## 2023-08-22 DIAGNOSIS — I82.5Z1 CHRONIC DEEP VEIN THROMBOSIS (DVT) OF DISTAL VEIN OF RIGHT LOWER EXTREMITY: ICD-10-CM

## 2023-08-22 LAB
INR PPP: 2.7
PROTHROMBIN TIME: 26.2 SECONDS (ref 9.1–10.9)

## 2023-08-22 PROCEDURE — 85610 PROTHROMBIN TIME: CPT

## 2023-08-22 PROCEDURE — 36415 COLL VENOUS BLD VENIPUNCTURE: CPT

## 2023-08-22 NOTE — PROGRESS NOTES
Please inform patient of lab results, which are all within acceptable ranges.  Repeat INR in 2 weeks

## 2023-08-24 DIAGNOSIS — I10 HYPERTENSION, UNSPECIFIED TYPE: ICD-10-CM

## 2023-08-24 RX ORDER — HYDROCHLOROTHIAZIDE 12.5 MG/1
12.5 TABLET ORAL
Qty: 30 TABLET | Refills: 1 | Status: SHIPPED | OUTPATIENT
Start: 2023-08-24 | End: 2023-09-18

## 2023-09-01 DIAGNOSIS — I10 HYPERTENSION, UNSPECIFIED TYPE: ICD-10-CM

## 2023-09-01 RX ORDER — LOSARTAN POTASSIUM 50 MG/1
50 TABLET ORAL
Qty: 30 TABLET | Refills: 1 | Status: SHIPPED | OUTPATIENT
Start: 2023-09-01

## 2023-09-05 ENCOUNTER — LAB VISIT (OUTPATIENT)
Dept: LAB | Facility: HOSPITAL | Age: 58
End: 2023-09-05
Attending: FAMILY MEDICINE
Payer: COMMERCIAL

## 2023-09-05 DIAGNOSIS — I82.5Z1 CHRONIC DEEP VEIN THROMBOSIS (DVT) OF DISTAL VEIN OF RIGHT LOWER EXTREMITY: ICD-10-CM

## 2023-09-05 LAB
INR PPP: 2.1
PROTHROMBIN TIME: 21.1 SECONDS (ref 9.1–10.9)

## 2023-09-05 PROCEDURE — 85610 PROTHROMBIN TIME: CPT

## 2023-09-05 PROCEDURE — 36415 COLL VENOUS BLD VENIPUNCTURE: CPT

## 2023-09-11 ENCOUNTER — OFFICE VISIT (OUTPATIENT)
Dept: FAMILY MEDICINE | Facility: CLINIC | Age: 58
End: 2023-09-11
Payer: COMMERCIAL

## 2023-09-11 ENCOUNTER — LAB VISIT (OUTPATIENT)
Dept: LAB | Facility: HOSPITAL | Age: 58
End: 2023-09-11
Attending: FAMILY MEDICINE
Payer: COMMERCIAL

## 2023-09-11 VITALS
SYSTOLIC BLOOD PRESSURE: 126 MMHG | DIASTOLIC BLOOD PRESSURE: 80 MMHG | OXYGEN SATURATION: 96 % | TEMPERATURE: 97 F | HEART RATE: 76 BPM | BODY MASS INDEX: 37.65 KG/M2 | RESPIRATION RATE: 18 BRPM | WEIGHT: 278 LBS | HEIGHT: 72 IN

## 2023-09-11 DIAGNOSIS — I82.5Z1 CHRONIC DEEP VEIN THROMBOSIS (DVT) OF DISTAL VEIN OF RIGHT LOWER EXTREMITY: ICD-10-CM

## 2023-09-11 DIAGNOSIS — I10 HYPERTENSION, UNSPECIFIED TYPE: Primary | ICD-10-CM

## 2023-09-11 LAB
INR PPP: 1.6
PROTHROMBIN TIME: 16.1 SECONDS (ref 9.1–10.9)

## 2023-09-11 PROCEDURE — 3079F DIAST BP 80-89 MM HG: CPT | Mod: CPTII,,, | Performed by: FAMILY MEDICINE

## 2023-09-11 PROCEDURE — 99214 PR OFFICE/OUTPT VISIT, EST, LEVL IV, 30-39 MIN: ICD-10-PCS | Mod: ,,, | Performed by: FAMILY MEDICINE

## 2023-09-11 PROCEDURE — 3079F PR MOST RECENT DIASTOLIC BLOOD PRESSURE 80-89 MM HG: ICD-10-PCS | Mod: CPTII,,, | Performed by: FAMILY MEDICINE

## 2023-09-11 PROCEDURE — 99214 OFFICE O/P EST MOD 30 MIN: CPT | Mod: ,,, | Performed by: FAMILY MEDICINE

## 2023-09-11 PROCEDURE — 3008F BODY MASS INDEX DOCD: CPT | Mod: CPTII,,, | Performed by: FAMILY MEDICINE

## 2023-09-11 PROCEDURE — 85610 PROTHROMBIN TIME: CPT

## 2023-09-11 PROCEDURE — 1160F PR REVIEW ALL MEDS BY PRESCRIBER/CLIN PHARMACIST DOCUMENTED: ICD-10-PCS | Mod: CPTII,,, | Performed by: FAMILY MEDICINE

## 2023-09-11 PROCEDURE — 3074F SYST BP LT 130 MM HG: CPT | Mod: CPTII,,, | Performed by: FAMILY MEDICINE

## 2023-09-11 PROCEDURE — 1159F PR MEDICATION LIST DOCUMENTED IN MEDICAL RECORD: ICD-10-PCS | Mod: CPTII,,, | Performed by: FAMILY MEDICINE

## 2023-09-11 PROCEDURE — 36415 COLL VENOUS BLD VENIPUNCTURE: CPT

## 2023-09-11 PROCEDURE — 1159F MED LIST DOCD IN RCRD: CPT | Mod: CPTII,,, | Performed by: FAMILY MEDICINE

## 2023-09-11 PROCEDURE — 4010F ACE/ARB THERAPY RXD/TAKEN: CPT | Mod: CPTII,,, | Performed by: FAMILY MEDICINE

## 2023-09-11 PROCEDURE — 1160F RVW MEDS BY RX/DR IN RCRD: CPT | Mod: CPTII,,, | Performed by: FAMILY MEDICINE

## 2023-09-11 PROCEDURE — 3008F PR BODY MASS INDEX (BMI) DOCUMENTED: ICD-10-PCS | Mod: CPTII,,, | Performed by: FAMILY MEDICINE

## 2023-09-11 PROCEDURE — 4010F PR ACE/ARB THEARPY RXD/TAKEN: ICD-10-PCS | Mod: CPTII,,, | Performed by: FAMILY MEDICINE

## 2023-09-11 PROCEDURE — 3074F PR MOST RECENT SYSTOLIC BLOOD PRESSURE < 130 MM HG: ICD-10-PCS | Mod: CPTII,,, | Performed by: FAMILY MEDICINE

## 2023-09-11 NOTE — PROGRESS NOTES
Subjective:       Patient ID: Lennox Epps is a 57 y.o. male.    Chief Complaint: 1 month follow up edema and PT/INR discuss, Anxiety/Depression      HPI    Hypertension  - tolerating medication (no side effects)  - no headaches  - patient without any complaints    Review of Systems   Constitutional: Negative.    Respiratory: Negative.     Cardiovascular: Negative.  Negative for leg swelling (Edema improved past stopping Norvasc).   Musculoskeletal:         Recent back injection with Dr Tran; Coumadin on hold x5 days           Objective:      /80 (BP Location: Left arm, Patient Position: Sitting, BP Method: Large (Manual))   Pulse 76   Temp 97.1 °F (36.2 °C)   Resp 18   Ht 6' (1.829 m)   Wt 126.1 kg (278 lb)   SpO2 96%   BMI 37.70 kg/m²    Physical Exam  Constitutional:       Appearance: Normal appearance.   Cardiovascular:      Rate and Rhythm: Normal rate and regular rhythm.      Heart sounds: Normal heart sounds.   Pulmonary:      Effort: Pulmonary effort is normal.      Breath sounds: Normal breath sounds.   Neurological:      Mental Status: He is alert.   Psychiatric:         Mood and Affect: Mood normal.         Behavior: Behavior normal.         Thought Content: Thought content normal.         Judgment: Judgment normal.             Recent Results (from the past 504 hour(s))   Protime-INR    Collection Time: 08/22/23  7:31 AM   Result Value Ref Range    PT 26.2 (H) 9.1 - 10.9 seconds    INR 2.7 (H) <=1.3   Protime-INR    Collection Time: 09/05/23  7:26 AM   Result Value Ref Range    PT 21.1 (H) 9.1 - 10.9 seconds    INR 2.1 (H) <=1.3   Protime-INR    Collection Time: 09/11/23  7:07 AM   Result Value Ref Range    PT 16.1 (H) 9.1 - 10.9 seconds    INR 1.6 (H) <=1.3        Assessment:       Problem List Items Addressed This Visit          Cardiac/Vascular    Hypertension - Primary       Hematology    Deep venous thrombosis          Plan:   1. Hypertension, unspecified type  Controlled  Continue  current Rx medication  Return to clinic with any concerns    2. Chronic deep vein thrombosis (DVT) of distal vein of right lower extremity  Restart Coumadin 7.5 mg q.day  Repeat INR in 1 week

## 2023-09-15 DIAGNOSIS — I10 HYPERTENSION, UNSPECIFIED TYPE: ICD-10-CM

## 2023-09-18 ENCOUNTER — LAB VISIT (OUTPATIENT)
Dept: LAB | Facility: HOSPITAL | Age: 58
End: 2023-09-18
Attending: FAMILY MEDICINE
Payer: COMMERCIAL

## 2023-09-18 DIAGNOSIS — R53.83 FATIGUE, UNSPECIFIED TYPE: ICD-10-CM

## 2023-09-18 DIAGNOSIS — I82.5Z1 CHRONIC DEEP VEIN THROMBOSIS (DVT) OF DISTAL VEIN OF RIGHT LOWER EXTREMITY: ICD-10-CM

## 2023-09-18 DIAGNOSIS — R07.89 OTHER CHEST PAIN: ICD-10-CM

## 2023-09-18 DIAGNOSIS — R42 DIZZINESS AND GIDDINESS: ICD-10-CM

## 2023-09-18 DIAGNOSIS — Z79.899 ENCOUNTER FOR LONG-TERM (CURRENT) USE OF OTHER MEDICATIONS: Primary | ICD-10-CM

## 2023-09-18 LAB
ANION GAP SERPL CALC-SCNC: 8 MEQ/L
BUN SERPL-MCNC: 19 MG/DL (ref 8.4–25.7)
CALCIUM SERPL-MCNC: 9.4 MG/DL (ref 8.4–10.2)
CHLORIDE SERPL-SCNC: 108 MMOL/L (ref 98–107)
CO2 SERPL-SCNC: 24 MMOL/L (ref 22–29)
CREAT SERPL-MCNC: 0.94 MG/DL (ref 0.73–1.18)
CREAT/UREA NIT SERPL: 20
GFR SERPLBLD CREATININE-BSD FMLA CKD-EPI: >60 MLS/MIN/1.73/M2
GLUCOSE SERPL-MCNC: 122 MG/DL (ref 74–100)
INR PPP: 3.7
POTASSIUM SERPL-SCNC: 4.1 MMOL/L (ref 3.5–5.1)
PROTHROMBIN TIME: 35.4 SECONDS (ref 9.1–10.9)
SODIUM SERPL-SCNC: 140 MMOL/L (ref 136–145)

## 2023-09-18 PROCEDURE — 36415 COLL VENOUS BLD VENIPUNCTURE: CPT

## 2023-09-18 PROCEDURE — 80048 BASIC METABOLIC PNL TOTAL CA: CPT

## 2023-09-18 PROCEDURE — 85610 PROTHROMBIN TIME: CPT

## 2023-09-18 RX ORDER — HYDROCHLOROTHIAZIDE 12.5 MG/1
12.5 TABLET ORAL
Qty: 30 TABLET | Refills: 1 | Status: SHIPPED | OUTPATIENT
Start: 2023-09-18 | End: 2023-10-10

## 2023-09-18 NOTE — PROGRESS NOTES
Please inform patient of results.     1.Take Coumadin 7.5 mg daily. Repeat INR in one week    Other labwork within acceptable ranges.

## 2023-09-19 ENCOUNTER — TELEPHONE (OUTPATIENT)
Dept: FAMILY MEDICINE | Facility: CLINIC | Age: 58
End: 2023-09-19
Payer: COMMERCIAL

## 2023-09-19 NOTE — TELEPHONE ENCOUNTER
----- Message from Aron Sullivan MD sent at 9/18/2023 12:03 PM CDT -----  Please inform patient of results.     1.Take Coumadin 7.5 mg daily. Repeat INR in one week    Other labwork within acceptable ranges.

## 2023-09-25 ENCOUNTER — LAB VISIT (OUTPATIENT)
Dept: LAB | Facility: HOSPITAL | Age: 58
End: 2023-09-25
Attending: FAMILY MEDICINE
Payer: COMMERCIAL

## 2023-09-25 ENCOUNTER — TELEPHONE (OUTPATIENT)
Dept: FAMILY MEDICINE | Facility: CLINIC | Age: 58
End: 2023-09-25
Payer: COMMERCIAL

## 2023-09-25 DIAGNOSIS — I82.5Z1 CHRONIC DEEP VEIN THROMBOSIS (DVT) OF DISTAL VEIN OF RIGHT LOWER EXTREMITY: ICD-10-CM

## 2023-09-25 DIAGNOSIS — I82.5Z1 CHRONIC DEEP VEIN THROMBOSIS (DVT) OF DISTAL VEIN OF RIGHT LOWER EXTREMITY: Primary | ICD-10-CM

## 2023-09-25 LAB
INR PPP: 3.3
PROTHROMBIN TIME: 31.9 SECONDS (ref 9.1–10.9)

## 2023-09-25 PROCEDURE — 85610 PROTHROMBIN TIME: CPT

## 2023-09-25 PROCEDURE — 36415 COLL VENOUS BLD VENIPUNCTURE: CPT

## 2023-09-25 RX ORDER — WARFARIN SODIUM 5 MG/1
5 TABLET ORAL DAILY
Qty: 7 TABLET | Refills: 0 | Status: SHIPPED | OUTPATIENT
Start: 2023-09-25 | End: 2023-10-05 | Stop reason: SDUPTHER

## 2023-09-25 RX ORDER — WARFARIN 1 MG/1
1 TABLET ORAL DAILY
Qty: 14 TABLET | Refills: 0 | Status: SHIPPED | OUTPATIENT
Start: 2023-09-25 | End: 2023-10-05 | Stop reason: SDUPTHER

## 2023-09-25 NOTE — PROGRESS NOTES
Please inform patient of results.     1. Decrease Coumadin by 1 mg.  Repeat INR in 1 week    Other labwork within acceptable ranges.

## 2023-09-25 NOTE — TELEPHONE ENCOUNTER
----- Message from Aron Sullivan MD sent at 9/25/2023  1:07 PM CDT -----  Please inform patient of results.     1. Decrease Coumadin by 1 mg.  Repeat INR in 1 week    Other labwork within acceptable ranges.

## 2023-10-02 ENCOUNTER — LAB VISIT (OUTPATIENT)
Dept: LAB | Facility: HOSPITAL | Age: 58
End: 2023-10-02
Attending: FAMILY MEDICINE
Payer: COMMERCIAL

## 2023-10-02 DIAGNOSIS — I82.5Z1 CHRONIC DEEP VEIN THROMBOSIS (DVT) OF DISTAL VEIN OF RIGHT LOWER EXTREMITY: ICD-10-CM

## 2023-10-02 DIAGNOSIS — I82.5Z1 CHRONIC DEEP VEIN THROMBOSIS (DVT) OF DISTAL VEIN OF RIGHT LOWER EXTREMITY: Primary | ICD-10-CM

## 2023-10-02 LAB
INR PPP: 2.3
PROTHROMBIN TIME: 22.5 SECONDS (ref 9.1–10.9)

## 2023-10-02 PROCEDURE — 36415 COLL VENOUS BLD VENIPUNCTURE: CPT

## 2023-10-02 PROCEDURE — 85610 PROTHROMBIN TIME: CPT

## 2023-10-02 RX ORDER — ENOXAPARIN SODIUM 100 MG/ML
40 INJECTION SUBCUTANEOUS EVERY 12 HOURS
Qty: 7 EACH | Refills: 0 | Status: SHIPPED | OUTPATIENT
Start: 2023-10-02

## 2023-10-05 DIAGNOSIS — I82.5Z1 CHRONIC DEEP VEIN THROMBOSIS (DVT) OF DISTAL VEIN OF RIGHT LOWER EXTREMITY: ICD-10-CM

## 2023-10-05 RX ORDER — WARFARIN 1 MG/1
1 TABLET ORAL DAILY
Qty: 14 TABLET | Refills: 0 | Status: SHIPPED | OUTPATIENT
Start: 2023-10-05 | End: 2023-10-16 | Stop reason: SDUPTHER

## 2023-10-05 RX ORDER — WARFARIN SODIUM 5 MG/1
5 TABLET ORAL DAILY
Qty: 7 TABLET | Refills: 0 | Status: SHIPPED | OUTPATIENT
Start: 2023-10-05 | End: 2023-10-16 | Stop reason: SDUPTHER

## 2023-10-09 ENCOUNTER — LAB VISIT (OUTPATIENT)
Dept: LAB | Facility: HOSPITAL | Age: 58
End: 2023-10-09
Attending: FAMILY MEDICINE
Payer: COMMERCIAL

## 2023-10-09 DIAGNOSIS — I82.5Z1 CHRONIC DEEP VEIN THROMBOSIS (DVT) OF DISTAL VEIN OF RIGHT LOWER EXTREMITY: ICD-10-CM

## 2023-10-09 LAB
INR PPP: 1.1
PROTHROMBIN TIME: 11.1 SECONDS (ref 9.1–10.9)

## 2023-10-09 PROCEDURE — 85610 PROTHROMBIN TIME: CPT

## 2023-10-09 PROCEDURE — 36415 COLL VENOUS BLD VENIPUNCTURE: CPT

## 2023-10-10 DIAGNOSIS — I10 HYPERTENSION, UNSPECIFIED TYPE: ICD-10-CM

## 2023-10-10 RX ORDER — HYDROCHLOROTHIAZIDE 12.5 MG/1
12.5 TABLET ORAL
Qty: 90 TABLET | Refills: 1 | Status: SHIPPED | OUTPATIENT
Start: 2023-10-10 | End: 2024-04-01

## 2023-10-16 ENCOUNTER — LAB VISIT (OUTPATIENT)
Dept: LAB | Facility: HOSPITAL | Age: 58
End: 2023-10-16
Attending: FAMILY MEDICINE
Payer: COMMERCIAL

## 2023-10-16 DIAGNOSIS — I82.5Z1 CHRONIC DEEP VEIN THROMBOSIS (DVT) OF DISTAL VEIN OF RIGHT LOWER EXTREMITY: ICD-10-CM

## 2023-10-16 LAB
INR PPP: 2.5
PROTHROMBIN TIME: 24.7 SECONDS (ref 9.1–10.9)

## 2023-10-16 PROCEDURE — 36415 COLL VENOUS BLD VENIPUNCTURE: CPT

## 2023-10-16 PROCEDURE — 85610 PROTHROMBIN TIME: CPT

## 2023-10-16 RX ORDER — WARFARIN 1 MG/1
1 TABLET ORAL DAILY
Qty: 30 TABLET | Refills: 0 | Status: SHIPPED | OUTPATIENT
Start: 2023-10-16 | End: 2023-10-17

## 2023-10-16 RX ORDER — WARFARIN SODIUM 5 MG/1
5 TABLET ORAL DAILY
Qty: 30 TABLET | Refills: 0 | Status: SHIPPED | OUTPATIENT
Start: 2023-10-16 | End: 2023-10-30

## 2023-10-17 RX ORDER — WARFARIN 1 MG/1
1.5 TABLET ORAL
Qty: 30 TABLET | Refills: 1 | Status: SHIPPED | OUTPATIENT
Start: 2023-10-17 | End: 2023-10-30

## 2023-10-23 ENCOUNTER — LAB VISIT (OUTPATIENT)
Dept: LAB | Facility: HOSPITAL | Age: 58
End: 2023-10-23
Attending: FAMILY MEDICINE
Payer: COMMERCIAL

## 2023-10-23 DIAGNOSIS — I82.5Z1 CHRONIC DEEP VEIN THROMBOSIS (DVT) OF DISTAL VEIN OF RIGHT LOWER EXTREMITY: ICD-10-CM

## 2023-10-23 LAB
INR PPP: 3.2
PROTHROMBIN TIME: 31.2 SECONDS (ref 9.1–10.9)

## 2023-10-23 PROCEDURE — 85610 PROTHROMBIN TIME: CPT

## 2023-10-23 PROCEDURE — 36415 COLL VENOUS BLD VENIPUNCTURE: CPT

## 2023-10-27 ENCOUNTER — LAB VISIT (OUTPATIENT)
Dept: LAB | Facility: HOSPITAL | Age: 58
End: 2023-10-27
Attending: FAMILY MEDICINE
Payer: COMMERCIAL

## 2023-10-27 DIAGNOSIS — Z11.59 NEED FOR HEPATITIS C SCREENING TEST: ICD-10-CM

## 2023-10-27 DIAGNOSIS — E03.9 HYPOTHYROIDISM, UNSPECIFIED TYPE: ICD-10-CM

## 2023-10-27 DIAGNOSIS — Z00.00 WELLNESS EXAMINATION: Primary | ICD-10-CM

## 2023-10-27 DIAGNOSIS — Z00.00 WELLNESS EXAMINATION: ICD-10-CM

## 2023-10-27 DIAGNOSIS — Z11.4 ENCOUNTER FOR SCREENING FOR HIV: ICD-10-CM

## 2023-10-27 DIAGNOSIS — Z12.5 SCREENING FOR MALIGNANT NEOPLASM OF PROSTATE: ICD-10-CM

## 2023-10-27 DIAGNOSIS — Z13.6 SCREENING FOR ISCHEMIC HEART DISEASE: ICD-10-CM

## 2023-10-27 LAB
ALBUMIN SERPL-MCNC: 3.9 G/DL (ref 3.5–5)
ALBUMIN/GLOB SERPL: 1.1 RATIO (ref 1.1–2)
ALP SERPL-CCNC: 74 UNIT/L (ref 40–150)
ALT SERPL-CCNC: 33 UNIT/L (ref 0–55)
AST SERPL-CCNC: 30 UNIT/L (ref 5–34)
BASOPHILS # BLD AUTO: 0.08 X10(3)/MCL
BASOPHILS NFR BLD AUTO: 1.3 %
BILIRUB SERPL-MCNC: 0.5 MG/DL
BUN SERPL-MCNC: 12 MG/DL (ref 8.4–25.7)
CALCIUM SERPL-MCNC: 9.1 MG/DL (ref 8.4–10.2)
CHLORIDE SERPL-SCNC: 108 MMOL/L (ref 98–107)
CHOLEST SERPL-MCNC: 181 MG/DL
CHOLEST/HDLC SERPL: 4 {RATIO} (ref 0–5)
CO2 SERPL-SCNC: 28 MMOL/L (ref 22–29)
CREAT SERPL-MCNC: 0.92 MG/DL (ref 0.73–1.18)
EOSINOPHIL # BLD AUTO: 0.39 X10(3)/MCL (ref 0–0.9)
EOSINOPHIL NFR BLD AUTO: 6.1 %
ERYTHROCYTE [DISTWIDTH] IN BLOOD BY AUTOMATED COUNT: 12.9 % (ref 11.5–17)
GFR SERPLBLD CREATININE-BSD FMLA CKD-EPI: >60 MLS/MIN/1.73/M2
GLOBULIN SER-MCNC: 3.7 GM/DL (ref 2.4–3.5)
GLUCOSE SERPL-MCNC: 97 MG/DL (ref 74–100)
HCT VFR BLD AUTO: 42.6 % (ref 42–52)
HCV AB SERPL QL IA: NONREACTIVE
HDLC SERPL-MCNC: 42 MG/DL (ref 35–60)
HGB BLD-MCNC: 14.6 G/DL (ref 14–18)
HIV 1+2 AB+HIV1 P24 AG SERPL QL IA: NONREACTIVE
IMM GRANULOCYTES # BLD AUTO: 0.02 X10(3)/MCL (ref 0–0.04)
IMM GRANULOCYTES NFR BLD AUTO: 0.3 %
LDLC SERPL CALC-MCNC: 118 MG/DL (ref 50–140)
LYMPHOCYTES # BLD AUTO: 2.34 X10(3)/MCL (ref 0.6–4.6)
LYMPHOCYTES NFR BLD AUTO: 36.9 %
MCH RBC QN AUTO: 31.7 PG (ref 27–31)
MCHC RBC AUTO-ENTMCNC: 34.3 G/DL (ref 33–36)
MCV RBC AUTO: 92.4 FL (ref 80–94)
MONOCYTES # BLD AUTO: 0.54 X10(3)/MCL (ref 0.1–1.3)
MONOCYTES NFR BLD AUTO: 8.5 %
NEUTROPHILS # BLD AUTO: 2.98 X10(3)/MCL (ref 2.1–9.2)
NEUTROPHILS NFR BLD AUTO: 46.9 %
NRBC BLD AUTO-RTO: 0 %
PLATELET # BLD AUTO: 240 X10(3)/MCL (ref 130–400)
PMV BLD AUTO: 8.6 FL (ref 7.4–10.4)
POTASSIUM SERPL-SCNC: 4.4 MMOL/L (ref 3.5–5.1)
PROT SERPL-MCNC: 7.6 GM/DL (ref 6.4–8.3)
PSA SERPL-MCNC: 1.36 NG/ML
RBC # BLD AUTO: 4.61 X10(6)/MCL (ref 4.7–6.1)
SODIUM SERPL-SCNC: 142 MMOL/L (ref 136–145)
TRIGL SERPL-MCNC: 103 MG/DL (ref 34–140)
TSH SERPL-ACNC: 1.04 UIU/ML (ref 0.35–4.94)
VLDLC SERPL CALC-MCNC: 21 MG/DL
WBC # SPEC AUTO: 6.35 X10(3)/MCL (ref 4.5–11.5)

## 2023-10-27 PROCEDURE — 36415 COLL VENOUS BLD VENIPUNCTURE: CPT

## 2023-10-27 PROCEDURE — 80053 COMPREHEN METABOLIC PANEL: CPT

## 2023-10-27 PROCEDURE — 86803 HEPATITIS C AB TEST: CPT

## 2023-10-27 PROCEDURE — 84153 ASSAY OF PSA TOTAL: CPT

## 2023-10-27 PROCEDURE — 85025 COMPLETE CBC W/AUTO DIFF WBC: CPT

## 2023-10-27 PROCEDURE — 84443 ASSAY THYROID STIM HORMONE: CPT

## 2023-10-27 PROCEDURE — 80061 LIPID PANEL: CPT

## 2023-10-27 PROCEDURE — 87389 HIV-1 AG W/HIV-1&-2 AB AG IA: CPT

## 2023-10-30 ENCOUNTER — LAB VISIT (OUTPATIENT)
Dept: LAB | Facility: HOSPITAL | Age: 58
End: 2023-10-30
Attending: FAMILY MEDICINE
Payer: COMMERCIAL

## 2023-10-30 ENCOUNTER — OFFICE VISIT (OUTPATIENT)
Dept: FAMILY MEDICINE | Facility: CLINIC | Age: 58
End: 2023-10-30
Payer: COMMERCIAL

## 2023-10-30 VITALS
SYSTOLIC BLOOD PRESSURE: 104 MMHG | BODY MASS INDEX: 38.06 KG/M2 | OXYGEN SATURATION: 95 % | HEIGHT: 72 IN | RESPIRATION RATE: 18 BRPM | HEART RATE: 78 BPM | DIASTOLIC BLOOD PRESSURE: 66 MMHG | TEMPERATURE: 98 F | WEIGHT: 281 LBS

## 2023-10-30 DIAGNOSIS — I82.5Z1 CHRONIC DEEP VEIN THROMBOSIS (DVT) OF DISTAL VEIN OF RIGHT LOWER EXTREMITY: ICD-10-CM

## 2023-10-30 DIAGNOSIS — Z00.00 WELLNESS EXAMINATION: Primary | ICD-10-CM

## 2023-10-30 LAB
INR PPP: 1.1
PROTHROMBIN TIME: 11.6 SECONDS (ref 9.1–10.9)

## 2023-10-30 PROCEDURE — 3074F PR MOST RECENT SYSTOLIC BLOOD PRESSURE < 130 MM HG: ICD-10-PCS | Mod: CPTII,,, | Performed by: FAMILY MEDICINE

## 2023-10-30 PROCEDURE — 4010F ACE/ARB THERAPY RXD/TAKEN: CPT | Mod: CPTII,,, | Performed by: FAMILY MEDICINE

## 2023-10-30 PROCEDURE — 99396 PR PREVENTIVE VISIT,EST,40-64: ICD-10-PCS | Mod: ,,, | Performed by: FAMILY MEDICINE

## 2023-10-30 PROCEDURE — 1159F PR MEDICATION LIST DOCUMENTED IN MEDICAL RECORD: ICD-10-PCS | Mod: CPTII,,, | Performed by: FAMILY MEDICINE

## 2023-10-30 PROCEDURE — 1159F MED LIST DOCD IN RCRD: CPT | Mod: CPTII,,, | Performed by: FAMILY MEDICINE

## 2023-10-30 PROCEDURE — 4010F PR ACE/ARB THEARPY RXD/TAKEN: ICD-10-PCS | Mod: CPTII,,, | Performed by: FAMILY MEDICINE

## 2023-10-30 PROCEDURE — 3008F BODY MASS INDEX DOCD: CPT | Mod: CPTII,,, | Performed by: FAMILY MEDICINE

## 2023-10-30 PROCEDURE — 3008F PR BODY MASS INDEX (BMI) DOCUMENTED: ICD-10-PCS | Mod: CPTII,,, | Performed by: FAMILY MEDICINE

## 2023-10-30 PROCEDURE — 3078F DIAST BP <80 MM HG: CPT | Mod: CPTII,,, | Performed by: FAMILY MEDICINE

## 2023-10-30 PROCEDURE — 1160F RVW MEDS BY RX/DR IN RCRD: CPT | Mod: CPTII,,, | Performed by: FAMILY MEDICINE

## 2023-10-30 PROCEDURE — 3078F PR MOST RECENT DIASTOLIC BLOOD PRESSURE < 80 MM HG: ICD-10-PCS | Mod: CPTII,,, | Performed by: FAMILY MEDICINE

## 2023-10-30 PROCEDURE — 1160F PR REVIEW ALL MEDS BY PRESCRIBER/CLIN PHARMACIST DOCUMENTED: ICD-10-PCS | Mod: CPTII,,, | Performed by: FAMILY MEDICINE

## 2023-10-30 PROCEDURE — 3074F SYST BP LT 130 MM HG: CPT | Mod: CPTII,,, | Performed by: FAMILY MEDICINE

## 2023-10-30 PROCEDURE — 36415 COLL VENOUS BLD VENIPUNCTURE: CPT

## 2023-10-30 PROCEDURE — 85610 PROTHROMBIN TIME: CPT

## 2023-10-30 PROCEDURE — 99396 PREV VISIT EST AGE 40-64: CPT | Mod: ,,, | Performed by: FAMILY MEDICINE

## 2023-10-30 NOTE — PROGRESS NOTES
Subjective:       Patient ID: Lennox Epps is a 57 y.o. male.    Chief Complaint: WELLNESS, SURGERY CLEARANCE DATE PENDING      Wellness        Review of Systems   Constitutional: Negative.    HENT:          Scheduled for turbinate surgery with Dr Reddy on 11/21/2023   Respiratory: Negative.     Cardiovascular: Negative.    Gastrointestinal: Negative.    Psychiatric/Behavioral: Negative.             Objective:      /66 (BP Location: Left arm, Patient Position: Sitting, BP Method: Large (Manual))   Pulse 78   Temp 97.8 °F (36.6 °C)   Resp 18   Ht 6' (1.829 m)   Wt 127.5 kg (281 lb)   SpO2 95%   BMI 38.11 kg/m²    Physical Exam  Constitutional:       Appearance: Normal appearance.   Cardiovascular:      Rate and Rhythm: Normal rate and regular rhythm.   Pulmonary:      Effort: Pulmonary effort is normal.      Breath sounds: Normal breath sounds.   Abdominal:      General: Abdomen is flat. Bowel sounds are normal.      Palpations: Abdomen is soft.   Neurological:      Mental Status: He is alert.   Psychiatric:         Mood and Affect: Mood normal.         Behavior: Behavior normal.         Thought Content: Thought content normal.         Judgment: Judgment normal.             Recent Results (from the past 504 hour(s))   Protime-INR    Collection Time: 10/16/23  7:13 AM   Result Value Ref Range    PT 24.7 (H) 9.1 - 10.9 seconds    INR 2.5 (H) <=1.3   Protime-INR    Collection Time: 10/23/23  7:05 AM   Result Value Ref Range    PT 31.2 (H) 9.1 - 10.9 seconds    INR 3.2 (H) <=1.3   Comprehensive Metabolic Panel    Collection Time: 10/27/23  8:25 AM   Result Value Ref Range    Sodium Level 142 136 - 145 mmol/L    Potassium Level 4.4 3.5 - 5.1 mmol/L    Chloride 108 (H) 98 - 107 mmol/L    Carbon Dioxide 28 22 - 29 mmol/L    Glucose Level 97 74 - 100 mg/dL    Blood Urea Nitrogen 12.0 8.4 - 25.7 mg/dL    Creatinine 0.92 0.73 - 1.18 mg/dL    Calcium Level Total 9.1 8.4 - 10.2 mg/dL    Protein Total 7.6 6.4 - 8.3  gm/dL    Albumin Level 3.9 3.5 - 5.0 g/dL    Globulin 3.7 (H) 2.4 - 3.5 gm/dL    Albumin/Globulin Ratio 1.1 1.1 - 2.0 ratio    Bilirubin Total 0.5 <=1.5 mg/dL    Alkaline Phosphatase 74 40 - 150 unit/L    Alanine Aminotransferase 33 0 - 55 unit/L    Aspartate Aminotransferase 30 5 - 34 unit/L    eGFR >60 mls/min/1.73/m2   Hepatitis C Antibody    Collection Time: 10/27/23  8:25 AM   Result Value Ref Range    Hep C Ab Interp Nonreactive Nonreactive   HIV 1/2 Ag/Ab (4th Gen)    Collection Time: 10/27/23  8:25 AM   Result Value Ref Range    HIV Nonreactive Nonreactive   Lipid Panel    Collection Time: 10/27/23  8:25 AM   Result Value Ref Range    Cholesterol Total 181 <=200 mg/dL    HDL Cholesterol 42 35 - 60 mg/dL    Triglyceride 103 34 - 140 mg/dL    Cholesterol/HDL Ratio 4 0 - 5    Very Low Density Lipoprotein 21     LDL Cholesterol 118.00 50.00 - 140.00 mg/dL   PSA, Screening    Collection Time: 10/27/23  8:25 AM   Result Value Ref Range    Prostate Specific Antigen 1.36 <=4.00 ng/mL   TSH    Collection Time: 10/27/23  8:25 AM   Result Value Ref Range    TSH 1.036 0.350 - 4.940 uIU/mL   CBC with Differential    Collection Time: 10/27/23  8:25 AM   Result Value Ref Range    WBC 6.35 4.50 - 11.50 x10(3)/mcL    RBC 4.61 (L) 4.70 - 6.10 x10(6)/mcL    Hgb 14.6 14.0 - 18.0 g/dL    Hct 42.6 42.0 - 52.0 %    MCV 92.4 80.0 - 94.0 fL    MCH 31.7 (H) 27.0 - 31.0 pg    MCHC 34.3 33.0 - 36.0 g/dL    RDW 12.9 11.5 - 17.0 %    Platelet 240 130 - 400 x10(3)/mcL    MPV 8.6 7.4 - 10.4 fL    Neut % 46.9 %    Lymph % 36.9 %    Mono % 8.5 %    Eos % 6.1 %    Basophil % 1.3 %    Lymph # 2.34 0.6 - 4.6 x10(3)/mcL    Neut # 2.98 2.1 - 9.2 x10(3)/mcL    Mono # 0.54 0.1 - 1.3 x10(3)/mcL    Eos # 0.39 0 - 0.9 x10(3)/mcL    Baso # 0.08 <=0.2 x10(3)/mcL    IG# 0.02 0 - 0.04 x10(3)/mcL    IG% 0.3 %    NRBC% 0.0 %   Protime-INR    Collection Time: 10/30/23  8:53 AM   Result Value Ref Range    PT 11.6 (H) 9.1 - 10.9 seconds    INR 1.1 <=1.3         Assessment:       Problem List Items Addressed This Visit          Hematology    Deep venous thrombosis     Other Visit Diagnoses       Wellness examination    -  Primary               Plan:   1. Wellness examination  Lab work discussed with patient  Continue current medication  Continue diet/exercise  Return to clinic with any concerns    2. Chronic deep vein thrombosis (DVT) of distal vein of right lower extremity  INR discussed   Restart Coumadin 7.5 mg q.day  Repeat INR in 1 week

## 2023-10-30 NOTE — PROGRESS NOTES
Please inform patient of results.     1. Restart medication after surgery.  Repeat INR in 1 week    Other labwork within acceptable ranges.

## 2023-11-06 ENCOUNTER — LAB VISIT (OUTPATIENT)
Dept: LAB | Facility: HOSPITAL | Age: 58
End: 2023-11-06
Attending: FAMILY MEDICINE
Payer: COMMERCIAL

## 2023-11-06 DIAGNOSIS — I82.5Z1 CHRONIC DEEP VEIN THROMBOSIS (DVT) OF DISTAL VEIN OF RIGHT LOWER EXTREMITY: ICD-10-CM

## 2023-11-06 LAB
INR PPP: 4.2
PROTHROMBIN TIME: 41.1 SECONDS (ref 9.1–10.9)

## 2023-11-06 PROCEDURE — 36415 COLL VENOUS BLD VENIPUNCTURE: CPT

## 2023-11-06 PROCEDURE — 85610 PROTHROMBIN TIME: CPT

## 2023-11-06 NOTE — PROGRESS NOTES
Please inform patient of results.     1. Hold Coumadin x1 day.  Restart Coumadin 5 mg q.day.  Repeat INR in 1 week    Other labwork within acceptable ranges.

## 2023-11-13 ENCOUNTER — LAB VISIT (OUTPATIENT)
Dept: LAB | Facility: HOSPITAL | Age: 58
End: 2023-11-13
Attending: FAMILY MEDICINE
Payer: COMMERCIAL

## 2023-11-13 DIAGNOSIS — I82.5Z1 CHRONIC DEEP VEIN THROMBOSIS (DVT) OF DISTAL VEIN OF RIGHT LOWER EXTREMITY: ICD-10-CM

## 2023-11-13 LAB
INR PPP: 3.2
PROTHROMBIN TIME: 31.6 SECONDS (ref 9.1–10.9)

## 2023-11-13 PROCEDURE — 85610 PROTHROMBIN TIME: CPT

## 2023-11-13 PROCEDURE — 36415 COLL VENOUS BLD VENIPUNCTURE: CPT

## 2023-11-20 ENCOUNTER — OFFICE VISIT (OUTPATIENT)
Dept: FAMILY MEDICINE | Facility: CLINIC | Age: 58
End: 2023-11-20
Payer: COMMERCIAL

## 2023-11-20 VITALS
DIASTOLIC BLOOD PRESSURE: 78 MMHG | SYSTOLIC BLOOD PRESSURE: 124 MMHG | BODY MASS INDEX: 38.06 KG/M2 | RESPIRATION RATE: 18 BRPM | HEART RATE: 98 BPM | TEMPERATURE: 98 F | OXYGEN SATURATION: 96 % | WEIGHT: 281 LBS | HEIGHT: 72 IN

## 2023-11-20 DIAGNOSIS — F33.9 DEPRESSION, RECURRENT: Primary | ICD-10-CM

## 2023-11-20 PROCEDURE — 1160F RVW MEDS BY RX/DR IN RCRD: CPT | Mod: CPTII,,, | Performed by: FAMILY MEDICINE

## 2023-11-20 PROCEDURE — 1160F PR REVIEW ALL MEDS BY PRESCRIBER/CLIN PHARMACIST DOCUMENTED: ICD-10-PCS | Mod: CPTII,,, | Performed by: FAMILY MEDICINE

## 2023-11-20 PROCEDURE — 3078F PR MOST RECENT DIASTOLIC BLOOD PRESSURE < 80 MM HG: ICD-10-PCS | Mod: CPTII,,, | Performed by: FAMILY MEDICINE

## 2023-11-20 PROCEDURE — 3078F DIAST BP <80 MM HG: CPT | Mod: CPTII,,, | Performed by: FAMILY MEDICINE

## 2023-11-20 PROCEDURE — 1159F MED LIST DOCD IN RCRD: CPT | Mod: CPTII,,, | Performed by: FAMILY MEDICINE

## 2023-11-20 PROCEDURE — 99214 OFFICE O/P EST MOD 30 MIN: CPT | Mod: ,,, | Performed by: FAMILY MEDICINE

## 2023-11-20 PROCEDURE — 3008F PR BODY MASS INDEX (BMI) DOCUMENTED: ICD-10-PCS | Mod: CPTII,,, | Performed by: FAMILY MEDICINE

## 2023-11-20 PROCEDURE — 4010F PR ACE/ARB THEARPY RXD/TAKEN: ICD-10-PCS | Mod: CPTII,,, | Performed by: FAMILY MEDICINE

## 2023-11-20 PROCEDURE — 4010F ACE/ARB THERAPY RXD/TAKEN: CPT | Mod: CPTII,,, | Performed by: FAMILY MEDICINE

## 2023-11-20 PROCEDURE — 1159F PR MEDICATION LIST DOCUMENTED IN MEDICAL RECORD: ICD-10-PCS | Mod: CPTII,,, | Performed by: FAMILY MEDICINE

## 2023-11-20 PROCEDURE — 3074F SYST BP LT 130 MM HG: CPT | Mod: CPTII,,, | Performed by: FAMILY MEDICINE

## 2023-11-20 PROCEDURE — 99214 PR OFFICE/OUTPT VISIT, EST, LEVL IV, 30-39 MIN: ICD-10-PCS | Mod: ,,, | Performed by: FAMILY MEDICINE

## 2023-11-20 PROCEDURE — 3074F PR MOST RECENT SYSTOLIC BLOOD PRESSURE < 130 MM HG: ICD-10-PCS | Mod: CPTII,,, | Performed by: FAMILY MEDICINE

## 2023-11-20 PROCEDURE — 3008F BODY MASS INDEX DOCD: CPT | Mod: CPTII,,, | Performed by: FAMILY MEDICINE

## 2023-11-20 RX ORDER — ARIPIPRAZOLE 5 MG/1
10 TABLET ORAL DAILY
Qty: 60 TABLET | Refills: 11 | Status: SHIPPED | OUTPATIENT
Start: 2023-11-20 | End: 2023-11-20 | Stop reason: SDUPTHER

## 2023-11-20 RX ORDER — ARIPIPRAZOLE 15 MG/1
15 TABLET ORAL DAILY
Qty: 30 TABLET | Refills: 1 | Status: SHIPPED | OUTPATIENT
Start: 2023-11-20 | End: 2023-12-12

## 2023-11-20 NOTE — PROGRESS NOTES
Subjective:       Patient ID: Lennox Epps is a 57 y.o. male.    Chief Complaint: severe fatigue x 5 days, Anorexia, possible depression, and has sleep apnea- uses machine as directed      Fatigue        Review of Systems   Constitutional:  Positive for appetite change and fatigue.   Respiratory: Negative.          Using CPAP nightly, scheduled for procedure with Dr Reddy on 11/21/2023   Cardiovascular: Negative.    Psychiatric/Behavioral:          Depressed: no hx of prior medication, reports feeling down           Objective:      /78 (BP Location: Left arm, Patient Position: Sitting, BP Method: Large (Manual))   Pulse 98   Temp 97.5 °F (36.4 °C)   Resp 18   Ht 6' (1.829 m)   Wt 127.5 kg (281 lb)   SpO2 96%   BMI 38.11 kg/m²    Physical Exam  Constitutional:       Appearance: Normal appearance.   Cardiovascular:      Rate and Rhythm: Normal rate and regular rhythm.      Heart sounds: Normal heart sounds.   Pulmonary:      Effort: Pulmonary effort is normal.      Breath sounds: Normal breath sounds.   Neurological:      Mental Status: He is alert.   Psychiatric:         Mood and Affect: Mood is depressed.         Behavior: Behavior normal.         Thought Content: Thought content normal.         Judgment: Judgment normal.               Assessment:       Problem List Items Addressed This Visit          Psychiatric    Depression, recurrent - Primary    Relevant Medications    ARIPiprazole (ABILIFY) 15 MG Tab          Plan:   1. Depression, recurrent  -     ARIPiprazole (ABILIFY) 15 MG Tab; Take 1 tablet (15 mg total) by mouth once daily.  Dispense: 30 tablet; Refill: 1  Increase Abilify to 15 mg q.day   Continue fluoxetine 40 mg q.day  Relaxation technique  Keep scheduled appointment with Dr. Reddy for right sinus surgery   Return to clinic with any concerns

## 2023-11-28 ENCOUNTER — LAB VISIT (OUTPATIENT)
Dept: LAB | Facility: HOSPITAL | Age: 58
End: 2023-11-28
Attending: FAMILY MEDICINE
Payer: COMMERCIAL

## 2023-11-28 DIAGNOSIS — I82.5Z1 CHRONIC DEEP VEIN THROMBOSIS (DVT) OF DISTAL VEIN OF RIGHT LOWER EXTREMITY: ICD-10-CM

## 2023-11-28 DIAGNOSIS — D68.2 FACTOR V DEFICIENCY: Primary | ICD-10-CM

## 2023-11-28 LAB
INR PPP: 1.7
PROTHROMBIN TIME: 17 SECONDS (ref 9.1–10.9)

## 2023-11-28 PROCEDURE — 36415 COLL VENOUS BLD VENIPUNCTURE: CPT

## 2023-11-28 PROCEDURE — 85610 PROTHROMBIN TIME: CPT

## 2023-11-29 ENCOUNTER — TELEPHONE (OUTPATIENT)
Dept: FAMILY MEDICINE | Facility: CLINIC | Age: 58
End: 2023-11-29
Payer: COMMERCIAL

## 2023-11-29 NOTE — TELEPHONE ENCOUNTER
----- Message from Aron Sullivan MD sent at 11/29/2023 10:12 AM CST -----  Please inform patient of results.     1. Increase Coumadin by 1 mg.  Repeat INR in 1 week    Other labwork within acceptable ranges.

## 2023-12-09 ENCOUNTER — HOSPITAL ENCOUNTER (EMERGENCY)
Facility: HOSPITAL | Age: 58
Discharge: HOME | End: 2023-12-09
Attending: EMERGENCY MEDICINE
Payer: COMMERCIAL

## 2023-12-09 VITALS
RESPIRATION RATE: 20 BRPM | DIASTOLIC BLOOD PRESSURE: 79 MMHG | OXYGEN SATURATION: 99 % | SYSTOLIC BLOOD PRESSURE: 115 MMHG | HEART RATE: 92 BPM | BODY MASS INDEX: 37.25 KG/M2 | TEMPERATURE: 98 F | HEIGHT: 72 IN | WEIGHT: 275 LBS

## 2023-12-09 DIAGNOSIS — J06.9 VIRAL UPPER RESPIRATORY ILLNESS: Primary | ICD-10-CM

## 2023-12-09 LAB
ALBUMIN SERPL-MCNC: 4.1 G/DL (ref 3.5–5)
ALBUMIN/GLOB SERPL: 1.1 RATIO (ref 1.1–2)
ALP SERPL-CCNC: 71 UNIT/L (ref 40–150)
ALT SERPL-CCNC: 36 UNIT/L (ref 0–55)
AST SERPL-CCNC: 30 UNIT/L (ref 5–34)
BASOPHILS # BLD AUTO: 0.06 X10(3)/MCL
BASOPHILS NFR BLD AUTO: 0.7 %
BILIRUB SERPL-MCNC: 0.5 MG/DL
BNP BLD-MCNC: 16.7 PG/ML
BUN SERPL-MCNC: 24 MG/DL (ref 8.4–25.7)
CALCIUM SERPL-MCNC: 9.5 MG/DL (ref 8.4–10.2)
CHLORIDE SERPL-SCNC: 105 MMOL/L (ref 98–107)
CO2 SERPL-SCNC: 24 MMOL/L (ref 22–29)
CREAT SERPL-MCNC: 1.05 MG/DL (ref 0.73–1.18)
EOSINOPHIL # BLD AUTO: 0.15 X10(3)/MCL (ref 0–0.9)
EOSINOPHIL NFR BLD AUTO: 1.7 %
ERYTHROCYTE [DISTWIDTH] IN BLOOD BY AUTOMATED COUNT: 12.9 % (ref 11.5–17)
FLUAV AG UPPER RESP QL IA.RAPID: NOT DETECTED
FLUBV AG UPPER RESP QL IA.RAPID: NOT DETECTED
GFR SERPLBLD CREATININE-BSD FMLA CKD-EPI: >60 MLS/MIN/1.73/M2
GLOBULIN SER-MCNC: 3.9 GM/DL (ref 2.4–3.5)
GLUCOSE SERPL-MCNC: 99 MG/DL (ref 74–100)
HCT VFR BLD AUTO: 43 % (ref 42–52)
HGB BLD-MCNC: 15.1 G/DL (ref 14–18)
IMM GRANULOCYTES # BLD AUTO: 0.02 X10(3)/MCL (ref 0–0.04)
IMM GRANULOCYTES NFR BLD AUTO: 0.2 %
LYMPHOCYTES # BLD AUTO: 2.3 X10(3)/MCL (ref 0.6–4.6)
LYMPHOCYTES NFR BLD AUTO: 25.9 %
MCH RBC QN AUTO: 31.9 PG (ref 27–31)
MCHC RBC AUTO-ENTMCNC: 35.1 G/DL (ref 33–36)
MCV RBC AUTO: 90.7 FL (ref 80–94)
MONOCYTES # BLD AUTO: 0.68 X10(3)/MCL (ref 0.1–1.3)
MONOCYTES NFR BLD AUTO: 7.6 %
NEUTROPHILS # BLD AUTO: 5.68 X10(3)/MCL (ref 2.1–9.2)
NEUTROPHILS NFR BLD AUTO: 63.9 %
NRBC BLD AUTO-RTO: 0 %
PLATELET # BLD AUTO: 238 X10(3)/MCL (ref 130–400)
PMV BLD AUTO: 8.5 FL (ref 7.4–10.4)
POTASSIUM SERPL-SCNC: 3.9 MMOL/L (ref 3.5–5.1)
PROT SERPL-MCNC: 8 GM/DL (ref 6.4–8.3)
RBC # BLD AUTO: 4.74 X10(6)/MCL (ref 4.7–6.1)
SARS-COV-2 RNA RESP QL NAA+PROBE: NOT DETECTED
SODIUM SERPL-SCNC: 142 MMOL/L (ref 136–145)
TROPONIN I SERPL-MCNC: <0.01 NG/ML (ref 0–0.04)
WBC # SPEC AUTO: 8.89 X10(3)/MCL (ref 4.5–11.5)

## 2023-12-09 PROCEDURE — 84484 ASSAY OF TROPONIN QUANT: CPT | Performed by: EMERGENCY MEDICINE

## 2023-12-09 PROCEDURE — 85025 COMPLETE CBC W/AUTO DIFF WBC: CPT | Performed by: EMERGENCY MEDICINE

## 2023-12-09 PROCEDURE — 25000242 PHARM REV CODE 250 ALT 637 W/ HCPCS: Performed by: EMERGENCY MEDICINE

## 2023-12-09 PROCEDURE — 80053 COMPREHEN METABOLIC PANEL: CPT | Performed by: EMERGENCY MEDICINE

## 2023-12-09 PROCEDURE — 83880 ASSAY OF NATRIURETIC PEPTIDE: CPT | Performed by: EMERGENCY MEDICINE

## 2023-12-09 PROCEDURE — 0240U COVID/FLU A&B PCR: CPT | Performed by: EMERGENCY MEDICINE

## 2023-12-09 PROCEDURE — 99284 EMERGENCY DEPT VISIT MOD MDM: CPT | Mod: 25

## 2023-12-09 RX ORDER — FAMOTIDINE 40 MG/1
40 TABLET, FILM COATED ORAL NIGHTLY
COMMUNITY
Start: 2023-10-25

## 2023-12-09 RX ORDER — AMLODIPINE BESYLATE 10 MG/1
1 TABLET ORAL EVERY MORNING
COMMUNITY
Start: 2023-07-10 | End: 2024-07-09

## 2023-12-09 RX ORDER — SUCRALFATE 1 G/1
TABLET ORAL
COMMUNITY
Start: 2023-10-05

## 2023-12-09 RX ORDER — PREDNISONE 10 MG/1
TABLET ORAL
COMMUNITY
Start: 2023-07-06

## 2023-12-09 RX ORDER — METHYLPREDNISOLONE 4 MG/1
TABLET ORAL
Qty: 21 EACH | Refills: 0 | Status: SHIPPED | OUTPATIENT
Start: 2023-12-09 | End: 2023-12-30

## 2023-12-09 RX ORDER — GABAPENTIN 300 MG/1
CAPSULE ORAL
COMMUNITY
Start: 2023-11-18

## 2023-12-09 RX ORDER — BACLOFEN 10 MG/1
10 TABLET ORAL 2 TIMES DAILY
COMMUNITY
Start: 2023-11-01

## 2023-12-09 RX ORDER — ALBUTEROL SULFATE 90 UG/1
1-2 AEROSOL, METERED RESPIRATORY (INHALATION) EVERY 6 HOURS PRN
Qty: 18 G | Refills: 1 | Status: SHIPPED | OUTPATIENT
Start: 2023-12-09

## 2023-12-09 RX ORDER — IPRATROPIUM BROMIDE AND ALBUTEROL SULFATE 2.5; .5 MG/3ML; MG/3ML
3 SOLUTION RESPIRATORY (INHALATION)
Status: COMPLETED | OUTPATIENT
Start: 2023-12-09 | End: 2023-12-09

## 2023-12-09 RX ORDER — FUROSEMIDE 20 MG/1
20 TABLET ORAL
COMMUNITY
Start: 2023-11-01

## 2023-12-09 RX ADMIN — IPRATROPIUM BROMIDE AND ALBUTEROL SULFATE 3 ML: 2.5; .5 SOLUTION RESPIRATORY (INHALATION) at 11:12

## 2023-12-09 NOTE — ED PROVIDER NOTES
Encounter Date: 12/9/2023       History     Chief Complaint   Patient presents with    Shortness of Breath    Chest Pain     SOB with Chest pain that has been going on x 3 weeks. Not getting better and got worse today, was not able to take a shower due to pain.     Patient is a 57-year-old male presenting with complain of shortness breath over the last week or so.  Patient states he also has a cough with no productive sputum.  Patient denies any fever or chills.  Patient denies nausea vomiting or abdominal pain.  Patient denies any chest pain.  Patient has a history of DVT and is on Coumadin.      Review of patient's allergies indicates:  No Known Allergies  Past Medical History:   Diagnosis Date    Anxiety 4/4/2023    DVT (deep venous thrombosis)     Factor 5 Leiden mutation, heterozygous     GERD (gastroesophageal reflux disease)     Hypertension      Past Surgical History:   Procedure Laterality Date    APPENDECTOMY      BACK SURGERY      COLONOSCOPY      KNEE ARTHROPLASTY Left     TONSILLECTOMY       Family History   Problem Relation Age of Onset    Stroke Mother     Heart disease Father     Hypertension Sister     Diabetes Sister      Social History     Tobacco Use    Smoking status: Never    Smokeless tobacco: Never   Substance Use Topics    Alcohol use: Never    Drug use: Never     Review of Systems   Constitutional: Negative.    HENT: Negative.     Respiratory:  Positive for cough and shortness of breath.    Cardiovascular: Negative.    Gastrointestinal: Negative.    Genitourinary: Negative.    Musculoskeletal: Negative.    Neurological: Negative.    Psychiatric/Behavioral: Negative.         Physical Exam     Initial Vitals [12/09/23 1141]   BP Pulse Resp Temp SpO2   (!) 142/88 95 (!) 22 97.5 °F (36.4 °C) 96 %      MAP       --         Physical Exam    Nursing note and vitals reviewed.  Constitutional: He appears well-developed and well-nourished.   HENT:   Head: Normocephalic.   Neck: Neck supple.   Normal  range of motion.  Cardiovascular:  Normal rate, regular rhythm and normal heart sounds.           Pulmonary/Chest: Breath sounds normal. No respiratory distress. He has no wheezes. He has no rhonchi. He has no rales.   Abdominal: Abdomen is soft. There is no abdominal tenderness. There is no guarding.   Musculoskeletal:         General: Normal range of motion.      Cervical back: Normal range of motion and neck supple.     Neurological: He is alert and oriented to person, place, and time. He has normal strength.   Skin: Skin is warm.   Psychiatric: He has a normal mood and affect. Thought content normal.         ED Course   Procedures  Labs Reviewed   COMPREHENSIVE METABOLIC PANEL - Abnormal; Notable for the following components:       Result Value    Globulin 3.9 (*)     All other components within normal limits   CBC WITH DIFFERENTIAL - Abnormal; Notable for the following components:    MCH 31.9 (*)     All other components within normal limits   TROPONIN I - Normal   COVID/FLU A&B PCR - Normal    Narrative:     The Xpert Xpress SARS-CoV-2/FLU/RSV plus is a rapid, multiplexed real-time PCR test intended for the simultaneous qualitative detection and differentiation of SARS-CoV-2, Influenza A, Influenza B, and respiratory syncytial virus (RSV) viral RNA in either nasopharyngeal swab or nasal swab specimens.         B-TYPE NATRIURETIC PEPTIDE - Normal   CBC W/ AUTO DIFFERENTIAL    Narrative:     The following orders were created for panel order CBC auto differential.  Procedure                               Abnormality         Status                     ---------                               -----------         ------                     CBC with Differential[[id number]]       Abnormal            Final result                 Please view results for these tests on the individual orders.     EKG Readings: (Independently Interpreted)   Initial Reading: No STEMI. Rhythm: Normal Sinus Rhythm. Heart Rate: 91. Ectopy:  PACs. Conduction: Normal. ST Segments: Normal ST Segments. T Waves: Normal. Axis: Normal.       Imaging Results              X-Ray Chest AP Portable (Final result)  Result time 12/09/23 12:25:19      Final result by Noah Coelho MD (12/09/23 12:25:19)                   Impression:      No acute pulmonary process identified.      Electronically signed by: Noah Coelho  Date:    12/09/2023  Time:    12:25               Narrative:    EXAMINATION:  XR CHEST AP PORTABLE    CLINICAL HISTORY:  Shortness of breath;    TECHNIQUE:  Frontal view(s) of the chest.    COMPARISON:  Radiography 07/18/2022    FINDINGS:  Normal cardiac silhouette.  The lungs are hypoinflated.  No consolidation identified.  No significant pleural effusion or discernible pneumothorax.                                       Medications   albuterol-ipratropium 2.5 mg-0.5 mg/3 mL nebulizer solution 3 mL (3 mLs Nebulization Given 12/9/23 1158)     Medical Decision Making  Differential diagnosis: Bronchospasm, pneumonia, CHF, COVID-19, upper respiratory infection    Amount and/or Complexity of Data Reviewed  Labs: ordered.     Details: All labs are stable  Radiology: ordered and independent interpretation performed.  ECG/medicine tests: ordered. Decision-making details documented in ED Course.  Discussion of management or test interpretation with external provider(s): After nebulizer treatment, patient states he is breathing much easier.  He denies any complaints of shortness of breath afterwards.  Will discharge patient home with a prescription for Medrol Dosepak and albuterol MDI                                      Clinical Impression:  Final diagnoses:  [J06.9] Viral upper respiratory illness (Primary)          ED Disposition Condition    Discharge Stable          ED Prescriptions       Medication Sig Dispense Start Date End Date Auth. Provider    albuterol (PROVENTIL/VENTOLIN HFA) 90 mcg/actuation inhaler Inhale 1-2 puffs into the lungs every 6  (six) hours as needed for Shortness of Breath. Rescue 18 g 12/9/2023 -- Lenny Hernández MD    methylPREDNISolone (MEDROL DOSEPACK) 4 mg tablet use as directed 21 each 12/9/2023 12/30/2023 Lenny Hernández MD          Follow-up Information       Follow up With Specialties Details Why Contact Info    Aron Sullivan MD Family Medicine In 2 days  [address] [facility] [address]  [phone]      [facility] Emergency Medicine  As needed, If symptoms worsen [address] [place] [id number] [phone]             Lenny Hernández MD  12/09/23 [id number]

## 2023-12-09 NOTE — ED NOTES
Pt brought to room 3 via wheelchair. Pt states he is SOB. Pt states he has been SOB for three weeks. Pt states he struggled to take a shower today and felt that he should come in to be evaluated. Pt VS stable upon arrival. Pt denies needs at this time.

## 2023-12-11 ENCOUNTER — TELEPHONE (OUTPATIENT)
Dept: FAMILY MEDICINE | Facility: CLINIC | Age: 58
End: 2023-12-11
Payer: COMMERCIAL

## 2023-12-11 ENCOUNTER — LAB VISIT (OUTPATIENT)
Dept: LAB | Facility: HOSPITAL | Age: 58
End: 2023-12-11
Attending: FAMILY MEDICINE
Payer: COMMERCIAL

## 2023-12-11 DIAGNOSIS — I82.5Z1 CHRONIC DEEP VEIN THROMBOSIS (DVT) OF DISTAL VEIN OF RIGHT LOWER EXTREMITY: ICD-10-CM

## 2023-12-11 DIAGNOSIS — D68.2 FACTOR V DEFICIENCY: ICD-10-CM

## 2023-12-11 LAB
INR PPP: 3.9
PROTHROMBIN TIME: 38.1 SECONDS (ref 9.1–10.9)

## 2023-12-11 PROCEDURE — 36415 COLL VENOUS BLD VENIPUNCTURE: CPT

## 2023-12-11 PROCEDURE — 85610 PROTHROMBIN TIME: CPT

## 2023-12-11 NOTE — TELEPHONE ENCOUNTER
----- Message from Aron Sullivan MD sent at 12/11/2023  9:40 AM CST -----  Please inform patient of results.     1. Hold Coumadin x2 days then restart Coumadin.  Repeat INR in 1 week    Other labwork within acceptable ranges.

## 2023-12-11 NOTE — PROGRESS NOTES
Please inform patient of results.     1. Hold Coumadin x2 days then restart Coumadin.  Repeat INR in 1 week    Other labwork within acceptable ranges.

## 2023-12-12 DIAGNOSIS — F33.9 DEPRESSION, RECURRENT: ICD-10-CM

## 2023-12-12 RX ORDER — ARIPIPRAZOLE 15 MG/1
15 TABLET ORAL
Qty: 90 TABLET | Refills: 1 | Status: SHIPPED | OUTPATIENT
Start: 2023-12-12 | End: 2023-12-14 | Stop reason: SDUPTHER

## 2023-12-14 ENCOUNTER — OFFICE VISIT (OUTPATIENT)
Dept: FAMILY MEDICINE | Facility: CLINIC | Age: 58
End: 2023-12-14
Payer: COMMERCIAL

## 2023-12-14 VITALS
OXYGEN SATURATION: 97 % | RESPIRATION RATE: 18 BRPM | HEART RATE: 109 BPM | BODY MASS INDEX: 37.11 KG/M2 | WEIGHT: 274 LBS | SYSTOLIC BLOOD PRESSURE: 118 MMHG | HEIGHT: 72 IN | TEMPERATURE: 97 F | DIASTOLIC BLOOD PRESSURE: 84 MMHG

## 2023-12-14 DIAGNOSIS — J06.9 ACUTE URI: ICD-10-CM

## 2023-12-14 DIAGNOSIS — F33.9 DEPRESSION, RECURRENT: Primary | ICD-10-CM

## 2023-12-14 PROCEDURE — 99214 PR OFFICE/OUTPT VISIT, EST, LEVL IV, 30-39 MIN: ICD-10-PCS | Mod: ,,, | Performed by: FAMILY MEDICINE

## 2023-12-14 PROCEDURE — 4010F ACE/ARB THERAPY RXD/TAKEN: CPT | Mod: CPTII,,, | Performed by: FAMILY MEDICINE

## 2023-12-14 PROCEDURE — 3008F PR BODY MASS INDEX (BMI) DOCUMENTED: ICD-10-PCS | Mod: CPTII,,, | Performed by: FAMILY MEDICINE

## 2023-12-14 PROCEDURE — 99214 OFFICE O/P EST MOD 30 MIN: CPT | Mod: ,,, | Performed by: FAMILY MEDICINE

## 2023-12-14 PROCEDURE — 3008F BODY MASS INDEX DOCD: CPT | Mod: CPTII,,, | Performed by: FAMILY MEDICINE

## 2023-12-14 PROCEDURE — 1159F PR MEDICATION LIST DOCUMENTED IN MEDICAL RECORD: ICD-10-PCS | Mod: CPTII,,, | Performed by: FAMILY MEDICINE

## 2023-12-14 PROCEDURE — 3074F PR MOST RECENT SYSTOLIC BLOOD PRESSURE < 130 MM HG: ICD-10-PCS | Mod: CPTII,,, | Performed by: FAMILY MEDICINE

## 2023-12-14 PROCEDURE — 3074F SYST BP LT 130 MM HG: CPT | Mod: CPTII,,, | Performed by: FAMILY MEDICINE

## 2023-12-14 PROCEDURE — 3079F PR MOST RECENT DIASTOLIC BLOOD PRESSURE 80-89 MM HG: ICD-10-PCS | Mod: CPTII,,, | Performed by: FAMILY MEDICINE

## 2023-12-14 PROCEDURE — 1160F PR REVIEW ALL MEDS BY PRESCRIBER/CLIN PHARMACIST DOCUMENTED: ICD-10-PCS | Mod: CPTII,,, | Performed by: FAMILY MEDICINE

## 2023-12-14 PROCEDURE — 3079F DIAST BP 80-89 MM HG: CPT | Mod: CPTII,,, | Performed by: FAMILY MEDICINE

## 2023-12-14 PROCEDURE — 1160F RVW MEDS BY RX/DR IN RCRD: CPT | Mod: CPTII,,, | Performed by: FAMILY MEDICINE

## 2023-12-14 PROCEDURE — 1159F MED LIST DOCD IN RCRD: CPT | Mod: CPTII,,, | Performed by: FAMILY MEDICINE

## 2023-12-14 PROCEDURE — 4010F PR ACE/ARB THEARPY RXD/TAKEN: ICD-10-PCS | Mod: CPTII,,, | Performed by: FAMILY MEDICINE

## 2023-12-14 RX ORDER — ARIPIPRAZOLE 15 MG/1
15 TABLET ORAL DAILY
Qty: 90 TABLET | Refills: 1 | Status: SHIPPED | OUTPATIENT
Start: 2023-12-14

## 2023-12-14 NOTE — PROGRESS NOTES
Subjective:       Patient ID: Lennox Epps is a 57 y.o. male.    Chief Complaint: 1 month fu, HTN and c/o sob with minimal exertion s/p ER visit       Follow-up        Review of Systems   Constitutional: Negative.  Negative for chills and fever.   HENT:  Positive for postnasal drip and rhinorrhea. Negative for nasal congestion, ear pain and sore throat.    Respiratory:  Positive for shortness of breath (MI). Negative for wheezing.    Cardiovascular: Negative.    Gastrointestinal: Negative.    Neurological:  Positive for headaches.   Psychiatric/Behavioral: Negative.          Depression: tolerating medication, medication working well, patient without any complaints             Objective:      /84 (BP Location: Left arm, Patient Position: Sitting, BP Method: Large (Manual))   Pulse 109   Temp 96.9 °F (36.1 °C)   Resp 18   Ht 6' (1.829 m)   Wt 124.3 kg (274 lb)   SpO2 97%   BMI 37.16 kg/m²    Physical Exam  Constitutional:       Appearance: Normal appearance.   Cardiovascular:      Rate and Rhythm: Normal rate and regular rhythm.      Heart sounds: Normal heart sounds.   Pulmonary:      Effort: Pulmonary effort is normal.      Breath sounds: Normal breath sounds.   Neurological:      Mental Status: He is alert.   Psychiatric:         Mood and Affect: Mood normal.         Behavior: Behavior normal.         Thought Content: Thought content normal.         Judgment: Judgment normal.               Assessment:       Problem List Items Addressed This Visit          Psychiatric    Depression, recurrent - Primary    Relevant Medications    ARIPiprazole (ABILIFY) 15 MG Tab     Other Visit Diagnoses       Acute URI                   Plan:   1. Depression, recurrent  -     ARIPiprazole (ABILIFY) 15 MG Tab; Take 1 tablet (15 mg total) by mouth once daily.  Dispense: 90 tablet; Refill: 1  Controlled  Continue current Rx medication  Return to clinic with any concerns    2. Acute URI  ER report reviewed; chest x-ray  reviewed  Continue Medrol Dosepak to completion   OTC meds p.r.n.  Monitor   Return to clinic with any concerns

## 2023-12-18 ENCOUNTER — LAB VISIT (OUTPATIENT)
Dept: LAB | Facility: HOSPITAL | Age: 58
End: 2023-12-18
Attending: FAMILY MEDICINE
Payer: COMMERCIAL

## 2023-12-18 DIAGNOSIS — I82.5Z1 CHRONIC DEEP VEIN THROMBOSIS (DVT) OF DISTAL VEIN OF RIGHT LOWER EXTREMITY: ICD-10-CM

## 2023-12-18 DIAGNOSIS — D68.2 FACTOR V DEFICIENCY: ICD-10-CM

## 2023-12-18 LAB
INR PPP: 2.9
PROTHROMBIN TIME: 29.1 SECONDS (ref 9.1–10.9)

## 2023-12-18 PROCEDURE — 36415 COLL VENOUS BLD VENIPUNCTURE: CPT

## 2023-12-18 PROCEDURE — 85610 PROTHROMBIN TIME: CPT

## 2024-01-12 ENCOUNTER — HOSPITAL ENCOUNTER (OUTPATIENT)
Dept: RADIOLOGY | Facility: HOSPITAL | Age: 59
Discharge: HOME | End: 2024-01-12
Attending: INTERNAL MEDICINE
Payer: COMMERCIAL

## 2024-01-12 DIAGNOSIS — I73.9 PERIPHERAL VASCULAR DISEASE, UNSPECIFIED: ICD-10-CM

## 2024-01-12 PROCEDURE — 25500020 PHARM REV CODE 255: Performed by: INTERNAL MEDICINE

## 2024-01-12 PROCEDURE — 75635 CT ANGIO ABDOMINAL ARTERIES: CPT | Mod: TC

## 2024-01-12 RX ORDER — SODIUM CHLORIDE 9 MG/ML
INJECTION, SOLUTION INTRAVENOUS ONCE
Status: DISCONTINUED | OUTPATIENT
Start: 2024-01-12 | End: 2024-01-13 | Stop reason: HOSPADM

## 2024-01-12 RX ADMIN — IOPAMIDOL 100 ML: 755 INJECTION, SOLUTION INTRAVENOUS at 08:01

## 2024-01-16 ENCOUNTER — LAB VISIT (OUTPATIENT)
Dept: LAB | Facility: HOSPITAL | Age: 59
End: 2024-01-16
Attending: FAMILY MEDICINE
Payer: COMMERCIAL

## 2024-01-16 DIAGNOSIS — I82.5Z1 CHRONIC DEEP VEIN THROMBOSIS (DVT) OF DISTAL VEIN OF RIGHT LOWER EXTREMITY: ICD-10-CM

## 2024-01-16 DIAGNOSIS — D68.2 FACTOR V DEFICIENCY: ICD-10-CM

## 2024-01-16 LAB
INR PPP: 2.4
PROTHROMBIN TIME: 24.5 SECONDS (ref 9.1–10.9)

## 2024-01-16 PROCEDURE — 36415 COLL VENOUS BLD VENIPUNCTURE: CPT

## 2024-01-16 PROCEDURE — 85610 PROTHROMBIN TIME: CPT

## 2024-01-20 DIAGNOSIS — F41.9 ANXIETY: ICD-10-CM

## 2024-01-22 RX ORDER — FLUOXETINE HYDROCHLORIDE 40 MG/1
CAPSULE ORAL
Qty: 90 CAPSULE | Refills: 1 | Status: SHIPPED | OUTPATIENT
Start: 2024-01-22

## 2024-01-26 DIAGNOSIS — Z92.29 HX OF LONG TERM USE OF BLOOD THINNERS: ICD-10-CM

## 2024-01-26 RX ORDER — WARFARIN 7.5 MG/1
TABLET ORAL
Qty: 90 TABLET | Refills: 1 | Status: SHIPPED | OUTPATIENT
Start: 2024-01-26

## 2024-02-05 DIAGNOSIS — R06.09 DYSPNEA ON EXERTION: ICD-10-CM

## 2024-02-05 DIAGNOSIS — R06.02 SHORTNESS OF BREATH: Primary | ICD-10-CM

## 2024-02-09 ENCOUNTER — HOSPITAL ENCOUNTER (OUTPATIENT)
Dept: RADIOLOGY | Facility: HOSPITAL | Age: 59
Discharge: HOME | End: 2024-02-09
Attending: INTERNAL MEDICINE
Payer: COMMERCIAL

## 2024-02-09 DIAGNOSIS — R06.02 SOB (SHORTNESS OF BREATH): ICD-10-CM

## 2024-02-09 DIAGNOSIS — R06.09 DYSPNEA ON EXERTION: ICD-10-CM

## 2024-02-09 PROCEDURE — 71046 X-RAY EXAM CHEST 2 VIEWS: CPT | Mod: TC

## 2024-02-09 PROCEDURE — 78582 LUNG VENTILAT&PERFUS IMAGING: CPT | Mod: TC

## 2024-02-12 ENCOUNTER — LAB VISIT (OUTPATIENT)
Dept: LAB | Facility: HOSPITAL | Age: 59
End: 2024-02-12
Attending: FAMILY MEDICINE
Payer: COMMERCIAL

## 2024-02-12 DIAGNOSIS — D68.2 FACTOR V DEFICIENCY: ICD-10-CM

## 2024-02-12 DIAGNOSIS — I82.5Z1 CHRONIC DEEP VEIN THROMBOSIS (DVT) OF DISTAL VEIN OF RIGHT LOWER EXTREMITY: ICD-10-CM

## 2024-02-12 LAB
INR PPP: 2.6
PROTHROMBIN TIME: 25.8 SECONDS (ref 9.1–10.9)

## 2024-02-12 PROCEDURE — 85610 PROTHROMBIN TIME: CPT

## 2024-02-12 PROCEDURE — 36415 COLL VENOUS BLD VENIPUNCTURE: CPT

## 2024-02-14 ENCOUNTER — TELEPHONE (OUTPATIENT)
Dept: FAMILY MEDICINE | Facility: CLINIC | Age: 59
End: 2024-02-14
Payer: COMMERCIAL

## 2024-02-14 NOTE — TELEPHONE ENCOUNTER
----- Message from Aron Sullivan MD sent at 2/14/2024  1:30 PM CST -----  Please inform patient of lab results, which are all within acceptable ranges.

## 2024-02-28 ENCOUNTER — OFFICE VISIT (OUTPATIENT)
Dept: FAMILY MEDICINE | Facility: CLINIC | Age: 59
End: 2024-02-28
Payer: COMMERCIAL

## 2024-02-28 VITALS — BODY MASS INDEX: 37.3 KG/M2 | WEIGHT: 275 LBS | DIASTOLIC BLOOD PRESSURE: 67 MMHG | SYSTOLIC BLOOD PRESSURE: 105 MMHG

## 2024-02-28 DIAGNOSIS — J32.9 SINUSITIS, UNSPECIFIED CHRONICITY, UNSPECIFIED LOCATION: Primary | ICD-10-CM

## 2024-02-28 PROCEDURE — 3008F BODY MASS INDEX DOCD: CPT | Mod: CPTII,95,, | Performed by: FAMILY MEDICINE

## 2024-02-28 PROCEDURE — 1160F RVW MEDS BY RX/DR IN RCRD: CPT | Mod: CPTII,95,, | Performed by: FAMILY MEDICINE

## 2024-02-28 PROCEDURE — 1159F MED LIST DOCD IN RCRD: CPT | Mod: CPTII,95,, | Performed by: FAMILY MEDICINE

## 2024-02-28 PROCEDURE — 4010F ACE/ARB THERAPY RXD/TAKEN: CPT | Mod: CPTII,95,, | Performed by: FAMILY MEDICINE

## 2024-02-28 PROCEDURE — 3074F SYST BP LT 130 MM HG: CPT | Mod: CPTII,95,, | Performed by: FAMILY MEDICINE

## 2024-02-28 PROCEDURE — 3078F DIAST BP <80 MM HG: CPT | Mod: CPTII,95,, | Performed by: FAMILY MEDICINE

## 2024-02-28 PROCEDURE — 99214 OFFICE O/P EST MOD 30 MIN: CPT | Mod: 95,,, | Performed by: FAMILY MEDICINE

## 2024-02-28 RX ORDER — AMOXICILLIN 500 MG/1
500 TABLET, FILM COATED ORAL EVERY 12 HOURS
Qty: 14 TABLET | Refills: 0 | Status: SHIPPED | OUTPATIENT
Start: 2024-02-28 | End: 2024-03-06

## 2024-02-28 RX ORDER — BROMPHENIRAMINE MALEATE AND PHENYLEPHRINE HYDROCHLORIDE 4; 10 MG/1; MG/1
1 TABLET, COATED ORAL EVERY 4 HOURS PRN
Qty: 30 EACH | Refills: 0 | Status: SHIPPED | OUTPATIENT
Start: 2024-02-28 | End: 2024-03-09

## 2024-02-28 NOTE — PROGRESS NOTES
Subjective:       Patient ID: Lennox Epps is a 58 y.o. male.    Chief Complaint: sinus fever      Sinus issues        Review of Systems   Constitutional:  Positive for chills and fever. Negative for activity change and unexpected weight change.   HENT:  Positive for nasal congestion, rhinorrhea and sinus pressure/congestion. Negative for ear pain, hearing loss, sore throat and trouble swallowing.    Eyes:  Negative for discharge and visual disturbance.   Respiratory:  Positive for cough (non-productive) and wheezing. Negative for chest tightness.    Cardiovascular:  Negative for chest pain and palpitations.   Gastrointestinal:  Negative for blood in stool, constipation, diarrhea and vomiting.   Endocrine: Negative for polydipsia and polyuria.   Genitourinary:  Negative for difficulty urinating, hematuria and urgency.   Musculoskeletal:  Negative for arthralgias, joint swelling and neck pain.   Neurological:  Positive for headaches. Negative for weakness.   Psychiatric/Behavioral:  Negative for dysphoric mood.            Objective:      /67   Wt 124.7 kg (275 lb)   BMI 37.30 kg/m²    Physical Exam  General:  Alert oriented, ill-appearing  Neurologic:  Alert, oriented  Psychiatric:  Cooperative, appropriate mood and affect, normal judgment      The patient location is: [place]  The chief complaint leading to consultation is: Sinusitis    Visit type: audiovisual    Face to Face time with patient: 15 minutes of total time spent on the encounter, which includes face to face time and non-face to face time preparing to see the patient (eg, review of tests), Obtaining and/or reviewing separately obtained history, Documenting clinical information in the electronic or other health record, Independently interpreting results (not separately reported) and communicating results to the patient/family/caregiver, or Care coordination (not separately reported).         Each patient to whom he or she provides medical  services by telemedicine is:  (1) informed of the relationship between the physician and patient and the respective role of any other health care provider with respect to management of the patient; and (2) notified that he or she may decline to receive medical services by telemedicine and may withdraw from such care at any time.          Assessment:       Problem List Items Addressed This Visit    None  Visit Diagnoses       Sinusitis, unspecified chronicity, unspecified location    -  Primary    Relevant Medications    amoxicillin (AMOXIL) 500 MG Tab    brompheniramine-phenylephrine (RU-HIST D) 4-10 mg Tab               Plan:   1. Sinusitis, unspecified chronicity, unspecified location  -     amoxicillin (AMOXIL) 500 MG Tab; Take 1 tablet (500 mg total) by mouth every 12 (twelve) hours. for 7 days  Dispense: 14 tablet; Refill: 0  -     brompheniramine-phenylephrine (RU-HIST D) 4-10 mg Tab; Take 1 tablet by mouth every 4 (four) hours as needed (congestion).  Dispense: 30 each; Refill: 0  Rx for above medication  OTC medications as needed  Monitor  Return to clinic with concerns

## 2024-03-04 ENCOUNTER — OFFICE VISIT (OUTPATIENT)
Dept: URGENT CARE | Facility: CLINIC | Age: 59
End: 2024-03-04
Payer: COMMERCIAL

## 2024-03-04 VITALS
RESPIRATION RATE: 20 BRPM | DIASTOLIC BLOOD PRESSURE: 79 MMHG | WEIGHT: 275 LBS | HEIGHT: 72 IN | SYSTOLIC BLOOD PRESSURE: 115 MMHG | TEMPERATURE: 98 F | OXYGEN SATURATION: 96 % | BODY MASS INDEX: 37.25 KG/M2 | HEART RATE: 115 BPM

## 2024-03-04 DIAGNOSIS — J10.1 INFLUENZA B: ICD-10-CM

## 2024-03-04 DIAGNOSIS — R05.2 SUBACUTE COUGH: Primary | ICD-10-CM

## 2024-03-04 DIAGNOSIS — R05.9 COUGH, UNSPECIFIED TYPE: ICD-10-CM

## 2024-03-04 LAB
CTP QC/QA: YES
CTP QC/QA: YES
POC MOLECULAR INFLUENZA A AGN: NEGATIVE
POC MOLECULAR INFLUENZA B AGN: POSITIVE
SARS-COV-2 RDRP RESP QL NAA+PROBE: NEGATIVE

## 2024-03-04 PROCEDURE — 87502 INFLUENZA DNA AMP PROBE: CPT | Mod: QW,,, | Performed by: PHYSICIAN ASSISTANT

## 2024-03-04 PROCEDURE — 87635 SARS-COV-2 COVID-19 AMP PRB: CPT | Mod: QW,,, | Performed by: PHYSICIAN ASSISTANT

## 2024-03-04 PROCEDURE — 96372 THER/PROPH/DIAG INJ SC/IM: CPT | Mod: ,,, | Performed by: PHYSICIAN ASSISTANT

## 2024-03-04 PROCEDURE — 99214 OFFICE O/P EST MOD 30 MIN: CPT | Mod: 25,,, | Performed by: PHYSICIAN ASSISTANT

## 2024-03-04 RX ORDER — PREDNISONE 10 MG/1
10 TABLET ORAL DAILY
Qty: 5 TABLET | Refills: 0 | Status: SHIPPED | OUTPATIENT
Start: 2024-03-04 | End: 2024-03-09

## 2024-03-04 RX ORDER — PROMETHAZINE HYDROCHLORIDE AND DEXTROMETHORPHAN HYDROBROMIDE 6.25; 15 MG/5ML; MG/5ML
5 SYRUP ORAL EVERY 8 HOURS PRN
Qty: 118 ML | Refills: 0 | Status: SHIPPED | OUTPATIENT
Start: 2024-03-04 | End: 2024-03-09

## 2024-03-04 RX ORDER — OSELTAMIVIR PHOSPHATE 75 MG/1
75 CAPSULE ORAL 2 TIMES DAILY
Qty: 10 EACH | Refills: 0 | Status: SHIPPED | OUTPATIENT
Start: 2024-03-04 | End: 2024-03-09

## 2024-03-04 RX ORDER — ONDANSETRON 8 MG/1
8 TABLET, ORALLY DISINTEGRATING ORAL 3 TIMES DAILY PRN
Qty: 6 TABLET | Refills: 0 | Status: SHIPPED | OUTPATIENT
Start: 2024-03-04 | End: 2024-03-06

## 2024-03-04 RX ORDER — DEXAMETHASONE SODIUM PHOSPHATE 100 MG/10ML
10 INJECTION INTRAMUSCULAR; INTRAVENOUS
Status: COMPLETED | OUTPATIENT
Start: 2024-03-04 | End: 2024-03-04

## 2024-03-04 RX ADMIN — DEXAMETHASONE SODIUM PHOSPHATE 10 MG: 100 INJECTION INTRAMUSCULAR; INTRAVENOUS at 12:03

## 2024-03-04 NOTE — PROGRESS NOTES
Subjective:      Patient ID: Lennox Epps is a 58 y.o. male.    Vitals:  height is 6' (1.829 m) and weight is 124.7 kg (275 lb). His oral temperature is 97.9 °F (36.6 °C). His blood pressure is 115/79 and his pulse is 115 (abnormal). His respiration is 20 and oxygen saturation is 96%.     Chief Complaint: Shortness of Breath (Shortness of breath, fatigue, weakness, cough.  Symptoms since Friday before last.  Did telehealth visit with Dr. Aron Sullivan and he got an antibiotic.  Patient is still taking antibiotics, finish them tomorrow.)    HPI  male reports in the last 9 days having generalized fatigue and mild URI symptoms contacting primary care office placed on amoxicillin antibiotic coverage for sinusitis concern.  Patient reports in the last 24 hours having rapidly worsening cough cold congestion severe fatigue and body aches leaving work early today presents to urgent Care for initial evaluation   Shortness of Breath     Additional comments: Shortness of breath, fatigue, weakness, cough.    Symptoms since Friday before last.  Did telehealth visit with Dr. Aron Sullivan and he got an antibiotic.  Patient is still taking antibiotics,   finish them tomorrow.    URI   Associated symptoms include congestion and coughing. Pertinent negatives include no diarrhea, ear pain, headaches, neck pain, sinus pain, vomiting or wheezing.       Constitution: Positive for chills and fatigue. Negative for fever.   HENT:  Positive for congestion, postnasal drip and sinus pressure. Negative for ear pain, sinus pain, trouble swallowing and voice change.    Neck: Negative for neck pain and neck stiffness.   Cardiovascular:  Positive for sob on exertion.   Respiratory:  Positive for chest tightness and cough. Negative for wheezing.    Gastrointestinal:  Negative for vomiting and diarrhea.   Musculoskeletal:  Positive for muscle ache.   Skin: Negative.  Negative for erythema.   Allergic/Immunologic: Negative.    Neurological:   Negative for headaches.      Objective:     Physical Exam   Constitutional: He is oriented to person, place, and time. He appears well-developed. He is cooperative.  Non-toxic appearance. He appears ill.      Comments:Awake alert ambulatory nasally congested fatigue male with active cough   obesity  HENT:   Head: Normocephalic.   Ears:   Right Ear: Hearing, tympanic membrane, external ear and ear canal normal.   Left Ear: Hearing, tympanic membrane, external ear and ear canal normal.   Nose: Congestion present. No mucosal edema, rhinorrhea or nasal deformity. No epistaxis. Right sinus exhibits no maxillary sinus tenderness and no frontal sinus tenderness. Left sinus exhibits no maxillary sinus tenderness and no frontal sinus tenderness.      Comments: Moderate  Mouth/Throat: Uvula is midline, oropharynx is clear and moist and mucous membranes are normal. Mucous membranes are moist. No trismus in the jaw. Normal dentition. No uvula swelling. No oropharyngeal exudate, posterior oropharyngeal edema or posterior oropharyngeal erythema.   Eyes: Conjunctivae and lids are normal. No scleral icterus.   Neck: Trachea normal and phonation normal. Neck supple. No edema present. No erythema present. No neck rigidity present.   Cardiovascular: Regular rhythm, normal heart sounds and normal pulses. Tachycardia present.   Pulmonary/Chest: Effort normal and breath sounds normal. No stridor. No respiratory distress. He has no decreased breath sounds. He has no wheezes. He has no rhonchi. He has no rales.   Musculoskeletal: Normal range of motion.         General: No swelling. Normal range of motion.      Cervical back: He exhibits no tenderness.   Lymphadenopathy:     He has no cervical adenopathy.   Neurological: no focal deficit. He is alert and oriented to person, place, and time. He exhibits normal muscle tone.   Skin: Skin is warm, dry, intact, not diaphoretic, not pale and no rash. bruising No erythema   Psychiatric: His  speech is normal and behavior is normal. Mood, judgment and thought content normal.   Nursing note and vitals reviewed.         Previous History      Review of patient's allergies indicates:  No Known Allergies    Past Medical History:   Diagnosis Date    Anxiety 4/4/2023    DVT (deep venous thrombosis)     Factor 5 Leiden mutation, heterozygous     GERD (gastroesophageal reflux disease)     Hypertension      Current Outpatient Medications   Medication Instructions    albuterol (PROVENTIL/VENTOLIN HFA) 90 mcg/actuation inhaler 1-2 puffs, Inhalation, Every 6 hours PRN, Rescue    amLODIPine (NORVASC) 10 MG tablet 1 tablet, Oral, Every morning    amoxicillin (AMOXIL) 500 mg, Oral, Every 12 hours    ARIPiprazole (ABILIFY) 15 mg, Oral, Daily    baclofen (LIORESAL) 10 mg, Oral, 2 times daily    brompheniramine-phenylephrine (RU-HIST D) 4-10 mg Tab 1 tablet, Oral, Every 4 hours PRN    enoxaparin (LOVENOX) 40 mg, Subcutaneous, Every 12 hours    esomeprazole (NEXIUM) 20 MG capsule Nexium 20 mg capsule,delayed release   Take 2 capsules every day by oral route.    famotidine (PEPCID) 40 mg, Oral, Nightly    FLUoxetine 40 MG capsule TAKE 1 CAPSULE BY MOUTH EVERY DAY    furosemide (LASIX) 20 mg, Oral    gabapentin (NEURONTIN) 300 MG capsule TAKE 1 CAPSULE BY MOUTH AT BEDTIME X5 DAYS THEN 1 EVERY 12 HOURS X5 DAYS THEN 1 EVERY 8 HOURS    hydroCHLOROthiazide (HYDRODIURIL) 12.5 mg, Oral    losartan (COZAAR) 50 mg, Oral    ondansetron (ZOFRAN-ODT) 8 mg, Oral, 3 times daily PRN    oseltamivir (TAMIFLU) 75 mg, Oral, 2 times daily    pantoprazole (PROTONIX) 40 MG tablet TAKE 1 TABLET BY MOUTH EVERY DAY    polyethylene glycol (GLYCOLAX) 17 g, Oral, Daily    predniSONE (DELTASONE) 10 MG tablet Oral    predniSONE (DELTASONE) 10 mg, Oral, Daily    promethazine-dextromethorphan (PROMETHAZINE-DM) 6.25-15 mg/5 mL Syrp 5 mLs, Oral, Every 8 hours PRN    sucralfate (CARAFATE) 1 gram tablet     warfarin (COUMADIN) 7.5 MG tablet TAKE 1 TAB BY MOUTH  ONCE DAILY ON ALL DAYS EXCEPT TUESDAY & THURSDAY. TAKE 1&1/2 DAILY ON THOSE DAYS     Past Surgical History:   Procedure Laterality Date    APPENDECTOMY      BACK SURGERY      COLONOSCOPY      KNEE ARTHROPLASTY Left     TONSILLECTOMY       Family History   Problem Relation Age of Onset    Stroke Mother     Heart disease Father     Hypertension Sister     Diabetes Sister        Social History     Tobacco Use    Smoking status: Never    Smokeless tobacco: Never   Substance Use Topics    Alcohol use: Never    Drug use: Never        Physical Exam      Vital Signs Reviewed   /79   Pulse (!) 115   Temp 97.9 °F (36.6 °C) (Oral)   Resp 20   Ht 6' (1.829 m)   Wt 124.7 kg (275 lb)   SpO2 96%   BMI 37.30 kg/m²        Procedures    Procedures     Labs     Results for orders placed or performed in visit on 03/04/24   POCT Influenza A/B MOLECULAR   Result Value Ref Range    POC Molecular Influenza A Ag Negative Negative, Not Reported    POC Molecular Influenza B Ag Positive (A) Negative, Not Reported     Acceptable Yes    POCT COVID-19 Rapid Screening   Result Value Ref Range    POC Rapid COVID Negative Negative     Acceptable Yes      XR CHEST PA AND LATERAL  Order: [id number]  Status: Final result       Visible to patient: Yes (not seen)       Next appt: 05/09/2024 at 08:00 AM in Family Medicine (Aron Sullivan MD)       Dx: Subacute cough    0 Result Notes  Details    Reading Physician Reading Date Result Priority   Trung Starks MD  [phone] 3/4/2024 STAT     Narrative & Impression  EXAMINATION:  XR CHEST PA AND LATERAL     CLINICAL HISTORY:  Subacute cough     TECHNIQUE:  Two-view     COMPARISON:  February 9, 2024.     FINDINGS:  Cardiopericardial silhouette is within normal limits.  Low volume lungs are without dense focal or segmental consolidation, congestion, pleural effusion or pneumothorax.     Impression:     No acute cardiopulmonary process identified.         Electronically signed by: Trung Starks  Date:                                            03/04/2024  Time:                                           11:55           Exam Ended: 03/04/24 11:32 CST Last Resulted: 03/04/24 11:55 CST       Order Details        View Encounter        Lab and Collection Details        Routing        Result History     View All Conversations on this Encounter           Result Care Coordination      Patient Communication     Add Comments   Add           Assessment:     1. Subacute cough    2. Cough, unspecified type    3. Influenza B        Plan:   Patient A&O x4 tolerates oral fluids in clinic.  Discuss concern for positive influenza viral test results and discharge plan with symptomatic Rx rest hydration and PCP follow-up.  Patient given strict instructions for follow-up or emergency department evaluation sooner if symptoms worsen.  Patient verbalized understanding satisfied ready for discharge now    Positive influenza viral testing today.    Recommend Tamiflu to help reduce viral sick time.  Encouraged plenty of water and noncarbonated fluids hydration rest over the next 3-5 days.  Slow to rise and stand to avoid lightheadedness dizziness or syncope.  If cough cold congestion and body aches persist in 1-2 days after steroid injection today may add oral steroid extension.  Recommend Coricidin decongestant over the next week as needed for upper respiratory symptoms along with Claritin or Zyrtec if needed for nasal allergies.  Recommend 3 day limited Afrin or Ignacio-Synephrine nasal spray if needed for severe nasal congestion.  Phenergan DM sparingly lowest dose if needed for severe cough cold congestion body aches or rest.  Zofran if needed for nausea or vomiting.      Recommend close follow-up with primary care physician in 1 week for re-evaluation influenza viral illness if not improving.  Recommended emergency department evaluation sooner if fatigue worsens respiratory symptoms  worsen.    Subacute cough  -     XR CHEST PA AND LATERAL; Future; Expected date: 03/04/2024    Cough, unspecified type  -     POCT Influenza A/B MOLECULAR  -     POCT COVID-19 Rapid Screening    Influenza B    Other orders  -     oseltamivir (TAMIFLU) 75 MG capsule; Take 1 capsule (75 mg total) by mouth 2 (two) times daily. for 5 days  Dispense: 10 each; Refill: 0  -     ondansetron (ZOFRAN-ODT) 8 MG TbDL; Take 1 tablet (8 mg total) by mouth 3 (three) times daily as needed (nausea or vomiting).  Dispense: 6 tablet; Refill: 0  -     promethazine-dextromethorphan (PROMETHAZINE-DM) 6.25-15 mg/5 mL Syrp; Take 5 mLs by mouth every 8 (eight) hours as needed (cough).  Dispense: 118 mL; Refill: 0  -     dexAMETHasone injection 10 mg  -     predniSONE (DELTASONE) 10 MG tablet; Take 1 tablet (10 mg total) by mouth once daily. for 5 days  Dispense: 5 tablet; Refill: 0

## 2024-03-04 NOTE — PATIENT INSTRUCTIONS
Positive influenza viral testing today.    Recommend Tamiflu to help reduce viral sick time.  Encouraged plenty of water and noncarbonated fluids hydration rest over the next 3-5 days.  Slow to rise and stand to avoid lightheadedness dizziness or syncope.  If cough cold congestion and body aches persist in 1-2 days after steroid injection today may add oral steroid extension.  Recommend Coricidin decongestant over the next week as needed for upper respiratory symptoms along with Claritin or Zyrtec if needed for nasal allergies.  Recommend 3 day limited Afrin or Ignacio-Synephrine nasal spray if needed for severe nasal congestion.  Phenergan DM sparingly lowest dose if needed for severe cough cold congestion body aches or rest.  Zofran if needed for nausea or vomiting.      Recommend close follow-up with primary care physician in 1 week for re-evaluation influenza viral illness if not improving.  Recommended emergency department evaluation sooner if fatigue worsens respiratory symptoms worsen.

## 2024-03-11 ENCOUNTER — TELEPHONE (OUTPATIENT)
Dept: FAMILY MEDICINE | Facility: CLINIC | Age: 59
End: 2024-03-11
Payer: COMMERCIAL

## 2024-03-11 ENCOUNTER — LAB VISIT (OUTPATIENT)
Dept: LAB | Facility: HOSPITAL | Age: 59
End: 2024-03-11
Attending: FAMILY MEDICINE
Payer: COMMERCIAL

## 2024-03-11 DIAGNOSIS — D68.2 FACTOR V DEFICIENCY: ICD-10-CM

## 2024-03-11 DIAGNOSIS — I82.5Z1 CHRONIC DEEP VEIN THROMBOSIS (DVT) OF DISTAL VEIN OF RIGHT LOWER EXTREMITY: ICD-10-CM

## 2024-03-11 LAB
INR PPP: 4.5
PROTHROMBIN TIME: 43.8 SECONDS (ref 9.1–10.9)

## 2024-03-11 PROCEDURE — 85610 PROTHROMBIN TIME: CPT

## 2024-03-11 PROCEDURE — 36415 COLL VENOUS BLD VENIPUNCTURE: CPT

## 2024-03-11 NOTE — TELEPHONE ENCOUNTER
----- Message from Aron Sullivan MD sent at 3/11/2024  1:06 PM CDT -----  Please inform patient of results.     1. Hold Coumadin.  Repeat INR in 2 days

## 2024-03-13 ENCOUNTER — TELEPHONE (OUTPATIENT)
Dept: FAMILY MEDICINE | Facility: CLINIC | Age: 59
End: 2024-03-13
Payer: COMMERCIAL

## 2024-03-13 ENCOUNTER — LAB VISIT (OUTPATIENT)
Dept: LAB | Facility: HOSPITAL | Age: 59
End: 2024-03-13
Attending: FAMILY MEDICINE
Payer: COMMERCIAL

## 2024-03-13 DIAGNOSIS — D68.2 FACTOR V DEFICIENCY: ICD-10-CM

## 2024-03-13 DIAGNOSIS — I82.5Z1 CHRONIC DEEP VEIN THROMBOSIS (DVT) OF DISTAL VEIN OF RIGHT LOWER EXTREMITY: ICD-10-CM

## 2024-03-13 LAB
INR PPP: 2.7
PROTHROMBIN TIME: 26.9 SECONDS (ref 9.1–10.9)

## 2024-03-13 PROCEDURE — 85610 PROTHROMBIN TIME: CPT

## 2024-03-13 PROCEDURE — 36415 COLL VENOUS BLD VENIPUNCTURE: CPT

## 2024-03-13 NOTE — TELEPHONE ENCOUNTER
----- Message from Aron Sullivan MD sent at 3/13/2024 12:50 PM CDT -----  Please inform patient of results.     1. Restart Coumadin.  Repeat INR on 3/18/24

## 2024-03-18 ENCOUNTER — TELEPHONE (OUTPATIENT)
Dept: FAMILY MEDICINE | Facility: CLINIC | Age: 59
End: 2024-03-18
Payer: COMMERCIAL

## 2024-03-18 ENCOUNTER — LAB VISIT (OUTPATIENT)
Dept: LAB | Facility: HOSPITAL | Age: 59
End: 2024-03-18
Attending: FAMILY MEDICINE
Payer: COMMERCIAL

## 2024-03-18 DIAGNOSIS — I82.5Z1 CHRONIC DEEP VEIN THROMBOSIS (DVT) OF DISTAL VEIN OF RIGHT LOWER EXTREMITY: ICD-10-CM

## 2024-03-18 DIAGNOSIS — D68.2 FACTOR V DEFICIENCY: ICD-10-CM

## 2024-03-18 LAB
INR PPP: 2.4
PROTHROMBIN TIME: 24.3 SECONDS (ref 9.1–10.9)

## 2024-03-18 PROCEDURE — 85610 PROTHROMBIN TIME: CPT

## 2024-03-18 PROCEDURE — 36415 COLL VENOUS BLD VENIPUNCTURE: CPT

## 2024-03-18 NOTE — TELEPHONE ENCOUNTER
----- Message from Aron Sullivan MD sent at 3/18/2024  9:14 AM CDT -----  Please inform patient of lab results, which are all within acceptable ranges.

## 2024-03-31 DIAGNOSIS — I10 HYPERTENSION, UNSPECIFIED TYPE: ICD-10-CM

## 2024-04-01 RX ORDER — HYDROCHLOROTHIAZIDE 12.5 MG/1
12.5 TABLET ORAL
Qty: 90 TABLET | Refills: 1 | Status: SHIPPED | OUTPATIENT
Start: 2024-04-01 | End: 2024-05-09

## 2024-04-15 ENCOUNTER — TELEPHONE (OUTPATIENT)
Dept: FAMILY MEDICINE | Facility: CLINIC | Age: 59
End: 2024-04-15
Payer: COMMERCIAL

## 2024-04-15 ENCOUNTER — LAB VISIT (OUTPATIENT)
Dept: LAB | Facility: HOSPITAL | Age: 59
End: 2024-04-15
Attending: FAMILY MEDICINE
Payer: COMMERCIAL

## 2024-04-15 DIAGNOSIS — D68.2 FACTOR V DEFICIENCY: ICD-10-CM

## 2024-04-15 DIAGNOSIS — I82.5Z1 CHRONIC DEEP VEIN THROMBOSIS (DVT) OF DISTAL VEIN OF RIGHT LOWER EXTREMITY: ICD-10-CM

## 2024-04-15 LAB
INR PPP: 2.3
PROTHROMBIN TIME: 23.5 SECONDS (ref 9.1–10.9)

## 2024-04-15 PROCEDURE — 36415 COLL VENOUS BLD VENIPUNCTURE: CPT

## 2024-04-15 PROCEDURE — 85610 PROTHROMBIN TIME: CPT

## 2024-04-15 NOTE — TELEPHONE ENCOUNTER
----- Message from Aron Sullivan MD sent at 4/15/2024  1:09 PM CDT -----  Please inform patient of results.     1. INR therapeutic.  Continue current medication.  Repeat INR in 1 month

## 2024-04-15 NOTE — PROGRESS NOTES
Please inform patient of results.     1. INR therapeutic.  Continue current medication.  Repeat INR in 1 month

## 2024-04-27 DIAGNOSIS — K21.9 GASTROESOPHAGEAL REFLUX DISEASE WITHOUT ESOPHAGITIS: ICD-10-CM

## 2024-04-29 RX ORDER — PANTOPRAZOLE SODIUM 40 MG/1
TABLET, DELAYED RELEASE ORAL
Qty: 90 TABLET | Refills: 1 | Status: SHIPPED | OUTPATIENT
Start: 2024-04-29

## 2024-05-09 ENCOUNTER — OFFICE VISIT (OUTPATIENT)
Dept: FAMILY MEDICINE | Facility: CLINIC | Age: 59
End: 2024-05-09
Payer: COMMERCIAL

## 2024-05-09 VITALS
DIASTOLIC BLOOD PRESSURE: 82 MMHG | HEART RATE: 90 BPM | BODY MASS INDEX: 36.57 KG/M2 | OXYGEN SATURATION: 98 % | WEIGHT: 270 LBS | SYSTOLIC BLOOD PRESSURE: 118 MMHG | TEMPERATURE: 97 F | HEIGHT: 72 IN | RESPIRATION RATE: 18 BRPM

## 2024-05-09 DIAGNOSIS — F41.9 ANXIETY: ICD-10-CM

## 2024-05-09 DIAGNOSIS — F33.9 DEPRESSION, RECURRENT: ICD-10-CM

## 2024-05-09 DIAGNOSIS — I82.5Z1 CHRONIC DEEP VEIN THROMBOSIS (DVT) OF DISTAL VEIN OF RIGHT LOWER EXTREMITY: Primary | ICD-10-CM

## 2024-05-09 DIAGNOSIS — E66.01 SEVERE OBESITY (BMI 35.0-39.9) WITH COMORBIDITY: ICD-10-CM

## 2024-05-09 DIAGNOSIS — I20.9 ANGINA PECTORIS: ICD-10-CM

## 2024-05-09 PROCEDURE — 1160F RVW MEDS BY RX/DR IN RCRD: CPT | Mod: CPTII,,, | Performed by: FAMILY MEDICINE

## 2024-05-09 PROCEDURE — 4010F ACE/ARB THERAPY RXD/TAKEN: CPT | Mod: CPTII,,, | Performed by: FAMILY MEDICINE

## 2024-05-09 PROCEDURE — 3079F DIAST BP 80-89 MM HG: CPT | Mod: CPTII,,, | Performed by: FAMILY MEDICINE

## 2024-05-09 PROCEDURE — 3074F SYST BP LT 130 MM HG: CPT | Mod: CPTII,,, | Performed by: FAMILY MEDICINE

## 2024-05-09 PROCEDURE — 3008F BODY MASS INDEX DOCD: CPT | Mod: CPTII,,, | Performed by: FAMILY MEDICINE

## 2024-05-09 PROCEDURE — 99214 OFFICE O/P EST MOD 30 MIN: CPT | Mod: ,,, | Performed by: FAMILY MEDICINE

## 2024-05-09 PROCEDURE — 1159F MED LIST DOCD IN RCRD: CPT | Mod: CPTII,,, | Performed by: FAMILY MEDICINE

## 2024-05-09 RX ORDER — HYDROXYZINE PAMOATE 25 MG/1
25 CAPSULE ORAL DAILY PRN
Qty: 30 CAPSULE | Refills: 1 | Status: SHIPPED | OUTPATIENT
Start: 2024-05-09 | End: 2024-05-31

## 2024-05-09 RX ORDER — AMLODIPINE BESYLATE 10 MG/1
10 TABLET ORAL EVERY MORNING
Start: 2024-05-09 | End: 2025-05-09

## 2024-05-09 NOTE — ASSESSMENT & PLAN NOTE
Continue Coumadin  
Controlled  Continue current Rx medication  Return to clinic with any concerns      
Followed by cardiology  
Reduced calorie diet modification  Frequent self weighing   Exercise/lifestyle modification  
None

## 2024-05-09 NOTE — PROGRESS NOTES
Subjective:      Patient ID: Lennox Epps is a 58 y.o. male.    Chief Complaint: 6 month and Anxiety      Routine    Anxiety  Patient reports no palpitations.           Review of Systems   Constitutional: Negative.    Respiratory: Negative.     Cardiovascular: Negative.  Negative for palpitations.   Gastrointestinal: Negative.    Psychiatric/Behavioral: Negative.          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   Cardiovascular:      Rate and Rhythm: Normal rate and regular rhythm.      Heart sounds: Normal heart sounds.   Pulmonary:      Effort: Pulmonary effort is normal.      Breath sounds: Normal breath sounds.   Neurological:      Mental Status: He is alert.   Psychiatric:         Mood and Affect: Mood normal.         Behavior: Behavior normal.         Thought Content: Thought content normal.         Judgment: Judgment normal.             Assessment:     Problem List Items Addressed This Visit          Psychiatric    Anxiety    Relevant Medications    hydrOXYzine pamoate (VISTARIL) 25 MG Cap    Depression, recurrent    Current Assessment & Plan     Controlled  Continue current Rx medication  Return to clinic with any concerns                Cardiac/Vascular    Angina pectoris    Current Assessment & Plan     Followed by cardiology            Hematology    Chronic deep vein thrombosis (DVT) of distal vein of right lower extremity - Primary    Current Assessment & Plan     Continue Coumadin            Endocrine    Severe obesity (BMI 35.0-39.9) with comorbidity    Current  Assessment & Plan     Reduced calorie diet modification  Frequent self weighing   Exercise/lifestyle modification             Plan:   1. Chronic deep vein thrombosis (DVT) of distal vein of right lower extremity  Assessment & Plan:  Continue Coumadin    2. Anxiety  -     hydrOXYzine pamoate (VISTARIL) 25 MG Cap; Take 1 capsule (25 mg total) by mouth daily as needed (anxiety).  Dispense: 30 capsule; Refill: 1  Rx for Vistaril 25 mg q.day p.r.n.  Relaxation technique  Monitor  Return to clinic with any concerns     3. Depression, recurrent  Assessment & Plan:  Controlled  Continue current Rx medication  Return to clinic with any concerns    4. Angina pectoris  Assessment & Plan:  Followed by cardiology    5. Severe obesity (BMI 35.0-39.9) with comorbidity  Assessment & Plan:  Reduced calorie diet modification  Frequent self weighing   Exercise/lifestyle modification

## 2024-05-13 ENCOUNTER — TELEPHONE (OUTPATIENT)
Dept: FAMILY MEDICINE | Facility: CLINIC | Age: 59
End: 2024-05-13
Payer: COMMERCIAL

## 2024-05-13 ENCOUNTER — LAB VISIT (OUTPATIENT)
Dept: LAB | Facility: HOSPITAL | Age: 59
End: 2024-05-13
Attending: FAMILY MEDICINE
Payer: COMMERCIAL

## 2024-05-13 DIAGNOSIS — I82.5Z1 CHRONIC DEEP VEIN THROMBOSIS (DVT) OF DISTAL VEIN OF RIGHT LOWER EXTREMITY: ICD-10-CM

## 2024-05-13 DIAGNOSIS — D68.2 FACTOR V DEFICIENCY: Primary | ICD-10-CM

## 2024-05-13 DIAGNOSIS — D68.2 FACTOR V DEFICIENCY: ICD-10-CM

## 2024-05-13 LAB
INR PPP: 3.5
PROTHROMBIN TIME: 34.6 SECONDS (ref 9.1–10.9)

## 2024-05-13 PROCEDURE — 85610 PROTHROMBIN TIME: CPT

## 2024-05-13 PROCEDURE — 36415 COLL VENOUS BLD VENIPUNCTURE: CPT

## 2024-05-13 RX ORDER — WARFARIN SODIUM 5 MG/1
5 TABLET ORAL DAILY
Qty: 8 TABLET | Refills: 0 | Status: SHIPPED | OUTPATIENT
Start: 2024-05-13 | End: 2024-05-22 | Stop reason: SDUPTHER

## 2024-05-13 RX ORDER — WARFARIN 1 MG/1
1 TABLET ORAL DAILY
Qty: 16 TABLET | Refills: 0 | Status: SHIPPED | OUTPATIENT
Start: 2024-05-13 | End: 2024-05-22 | Stop reason: SDUPTHER

## 2024-05-13 NOTE — TELEPHONE ENCOUNTER
----- Message from Aron Sullivan MD sent at 5/13/2024  8:44 AM CDT -----  Please inform patient of results.     1. Decrease Coumadin to 7 mg q.day.  Repeat INR in 1 week

## 2024-05-13 NOTE — TELEPHONE ENCOUNTER
----- Message from rAon Sullivan MD sent at 5/13/2024  8:44 AM CDT -----  Please inform patient of results.     1. Decrease Coumadin to 7 mg q.day.  Repeat INR in 1 week

## 2024-05-20 ENCOUNTER — TELEPHONE (OUTPATIENT)
Dept: FAMILY MEDICINE | Facility: CLINIC | Age: 59
End: 2024-05-20
Payer: COMMERCIAL

## 2024-05-20 ENCOUNTER — LAB VISIT (OUTPATIENT)
Dept: LAB | Facility: HOSPITAL | Age: 59
End: 2024-05-20
Attending: FAMILY MEDICINE
Payer: COMMERCIAL

## 2024-05-20 DIAGNOSIS — D68.2 FACTOR V DEFICIENCY: ICD-10-CM

## 2024-05-20 DIAGNOSIS — I82.5Z1 CHRONIC DEEP VEIN THROMBOSIS (DVT) OF DISTAL VEIN OF RIGHT LOWER EXTREMITY: ICD-10-CM

## 2024-05-20 LAB
INR PPP: 4.1
PROTHROMBIN TIME: 40.2 SECONDS (ref 9.1–10.9)

## 2024-05-20 PROCEDURE — 85610 PROTHROMBIN TIME: CPT

## 2024-05-20 PROCEDURE — 36415 COLL VENOUS BLD VENIPUNCTURE: CPT

## 2024-05-20 NOTE — TELEPHONE ENCOUNTER
----- Message from Aron Sullivan MD sent at 5/20/2024  8:56 AM CDT -----  Please inform patient of results.     1. Hold Coumadin.  Repeat INR in 2 days

## 2024-05-22 ENCOUNTER — LAB VISIT (OUTPATIENT)
Dept: LAB | Facility: HOSPITAL | Age: 59
End: 2024-05-22
Attending: FAMILY MEDICINE
Payer: COMMERCIAL

## 2024-05-22 DIAGNOSIS — I82.5Z1 CHRONIC DEEP VEIN THROMBOSIS (DVT) OF DISTAL VEIN OF RIGHT LOWER EXTREMITY: ICD-10-CM

## 2024-05-22 DIAGNOSIS — D68.2 FACTOR V DEFICIENCY: ICD-10-CM

## 2024-05-22 LAB
INR PPP: 2
PROTHROMBIN TIME: 19.8 SECONDS (ref 9.1–10.9)

## 2024-05-22 PROCEDURE — 36415 COLL VENOUS BLD VENIPUNCTURE: CPT

## 2024-05-22 PROCEDURE — 85610 PROTHROMBIN TIME: CPT

## 2024-05-22 RX ORDER — WARFARIN SODIUM 5 MG/1
5 TABLET ORAL DAILY
Qty: 8 TABLET | Refills: 0 | Status: SHIPPED | OUTPATIENT
Start: 2024-05-22 | End: 2024-05-29 | Stop reason: SDUPTHER

## 2024-05-22 RX ORDER — WARFARIN 1 MG/1
1 TABLET ORAL DAILY
Qty: 8 TABLET | Refills: 0 | Status: SHIPPED | OUTPATIENT
Start: 2024-05-22 | End: 2024-05-29 | Stop reason: SDUPTHER

## 2024-05-22 NOTE — PROGRESS NOTES
Please inform patient of results.     1. Decrease current Coumadin dose by 1 mg and restart Coumadin.  Repeat INR in 1 week

## 2024-05-29 ENCOUNTER — TELEPHONE (OUTPATIENT)
Dept: FAMILY MEDICINE | Facility: CLINIC | Age: 59
End: 2024-05-29
Payer: COMMERCIAL

## 2024-05-29 ENCOUNTER — LAB VISIT (OUTPATIENT)
Dept: LAB | Facility: HOSPITAL | Age: 59
End: 2024-05-29
Attending: FAMILY MEDICINE
Payer: COMMERCIAL

## 2024-05-29 DIAGNOSIS — D68.2 FACTOR V DEFICIENCY: ICD-10-CM

## 2024-05-29 DIAGNOSIS — I82.5Z1 CHRONIC DEEP VEIN THROMBOSIS (DVT) OF DISTAL VEIN OF RIGHT LOWER EXTREMITY: ICD-10-CM

## 2024-05-29 LAB
INR PPP: 2.2
PROTHROMBIN TIME: 22 SECONDS (ref 9.1–10.9)

## 2024-05-29 PROCEDURE — 85610 PROTHROMBIN TIME: CPT

## 2024-05-29 PROCEDURE — 36415 COLL VENOUS BLD VENIPUNCTURE: CPT

## 2024-05-29 RX ORDER — WARFARIN SODIUM 5 MG/1
5 TABLET ORAL DAILY
Qty: 30 TABLET | Refills: 3 | Status: SHIPPED | OUTPATIENT
Start: 2024-05-29 | End: 2025-05-29

## 2024-05-29 RX ORDER — WARFARIN 1 MG/1
1 TABLET ORAL DAILY
Qty: 30 TABLET | Refills: 3 | Status: SHIPPED | OUTPATIENT
Start: 2024-05-29 | End: 2025-05-29

## 2024-05-29 NOTE — TELEPHONE ENCOUNTER
----- Message from Aron Sullivan MD sent at 5/29/2024 12:07 PM CDT -----  Please inform patient of results.     1. INR therapeutic

## 2024-05-31 DIAGNOSIS — F41.9 ANXIETY: ICD-10-CM

## 2024-05-31 RX ORDER — HYDROXYZINE PAMOATE 25 MG/1
CAPSULE ORAL
Qty: 90 CAPSULE | Refills: 1 | Status: SHIPPED | OUTPATIENT
Start: 2024-05-31

## 2024-06-10 ENCOUNTER — TELEPHONE (OUTPATIENT)
Dept: FAMILY MEDICINE | Facility: CLINIC | Age: 59
End: 2024-06-10
Payer: COMMERCIAL

## 2024-06-10 ENCOUNTER — LAB VISIT (OUTPATIENT)
Dept: LAB | Facility: HOSPITAL | Age: 59
End: 2024-06-10
Attending: FAMILY MEDICINE
Payer: COMMERCIAL

## 2024-06-10 DIAGNOSIS — I82.5Z1 CHRONIC DEEP VEIN THROMBOSIS (DVT) OF DISTAL VEIN OF RIGHT LOWER EXTREMITY: ICD-10-CM

## 2024-06-10 DIAGNOSIS — D68.2 FACTOR V DEFICIENCY: ICD-10-CM

## 2024-06-10 LAB
INR PPP: 2.6
PROTHROMBIN TIME: 25.6 SECONDS (ref 9.1–10.9)

## 2024-06-10 PROCEDURE — 85610 PROTHROMBIN TIME: CPT

## 2024-06-10 PROCEDURE — 36415 COLL VENOUS BLD VENIPUNCTURE: CPT

## 2024-06-10 NOTE — TELEPHONE ENCOUNTER
----- Message from Aron Sullivan MD sent at 6/10/2024  4:16 PM CDT -----  Please inform patient of lab results, which are all within acceptable ranges.

## 2024-06-20 DIAGNOSIS — I10 HYPERTENSION, UNSPECIFIED TYPE: Primary | ICD-10-CM

## 2024-06-20 RX ORDER — AMLODIPINE BESYLATE 10 MG/1
10 TABLET ORAL EVERY MORNING
Qty: 30 TABLET | Refills: 3
Start: 2024-06-20 | End: 2024-06-21 | Stop reason: SDUPTHER

## 2024-06-21 DIAGNOSIS — I10 HYPERTENSION, UNSPECIFIED TYPE: ICD-10-CM

## 2024-06-21 RX ORDER — AMLODIPINE BESYLATE 10 MG/1
10 TABLET ORAL EVERY MORNING
Qty: 30 TABLET | Refills: 3 | Status: SHIPPED | OUTPATIENT
Start: 2024-06-21 | End: 2024-10-19

## 2024-07-15 ENCOUNTER — TELEPHONE (OUTPATIENT)
Dept: FAMILY MEDICINE | Facility: CLINIC | Age: 59
End: 2024-07-15
Payer: COMMERCIAL

## 2024-07-15 ENCOUNTER — LAB VISIT (OUTPATIENT)
Dept: LAB | Facility: HOSPITAL | Age: 59
End: 2024-07-15
Attending: FAMILY MEDICINE
Payer: COMMERCIAL

## 2024-07-15 DIAGNOSIS — F41.9 ANXIETY: ICD-10-CM

## 2024-07-15 DIAGNOSIS — D68.2 FACTOR V DEFICIENCY: ICD-10-CM

## 2024-07-15 DIAGNOSIS — I82.5Z1 CHRONIC DEEP VEIN THROMBOSIS (DVT) OF DISTAL VEIN OF RIGHT LOWER EXTREMITY: ICD-10-CM

## 2024-07-15 DIAGNOSIS — Z92.29 HX OF LONG TERM USE OF BLOOD THINNERS: Primary | ICD-10-CM

## 2024-07-15 LAB
INR PPP: 2.9
PROTHROMBIN TIME: 29 SECONDS (ref 9.1–10.9)

## 2024-07-15 PROCEDURE — 36415 COLL VENOUS BLD VENIPUNCTURE: CPT

## 2024-07-15 PROCEDURE — 85610 PROTHROMBIN TIME: CPT

## 2024-07-15 RX ORDER — FLUOXETINE HYDROCHLORIDE 40 MG/1
CAPSULE ORAL
Qty: 90 CAPSULE | Refills: 1 | Status: SHIPPED | OUTPATIENT
Start: 2024-07-15

## 2024-07-15 NOTE — TELEPHONE ENCOUNTER
----- Message from Aron Sullivan MD sent at 7/15/2024  1:02 PM CDT -----  Please inform patient of results.     1. INR therapeutic.  Repeat in 1 month

## 2024-07-30 ENCOUNTER — PATIENT OUTREACH (OUTPATIENT)
Facility: CLINIC | Age: 59
End: 2024-07-30
Payer: COMMERCIAL

## 2024-07-30 NOTE — PROGRESS NOTES
Health Maintenance Topic(s) Outreach Outcomes & Actions Taken:    Primary Care Appt - Outreach Outcomes & Actions Taken  : Primary Care Appt Scheduled and Patient has f/u scheduled with Primary Care on 8/15/2024 for surgery clearance and wellness scheduled on 11/14/2024    Medication Adherence / Statins - Outreach Outcomes & Actions Taken  : Primary Care Appt Scheduled and Patient has scheduled Primary care f/u on 8/15/2024                                     Additional Notes:           Care Management, Digital Medicine, and/or Education Referrals      Next Steps - Referral Actions: no referrals sent

## 2024-08-12 ENCOUNTER — LAB VISIT (OUTPATIENT)
Dept: LAB | Facility: HOSPITAL | Age: 59
End: 2024-08-12
Attending: FAMILY MEDICINE
Payer: COMMERCIAL

## 2024-08-12 DIAGNOSIS — I82.5Z1 CHRONIC DEEP VEIN THROMBOSIS (DVT) OF DISTAL VEIN OF RIGHT LOWER EXTREMITY: ICD-10-CM

## 2024-08-12 DIAGNOSIS — D68.2 FACTOR V DEFICIENCY: ICD-10-CM

## 2024-08-12 LAB
INR PPP: 2.4
PROTHROMBIN TIME: 23.7 SECONDS (ref 9.1–10.9)

## 2024-08-12 PROCEDURE — 85610 PROTHROMBIN TIME: CPT

## 2024-08-12 PROCEDURE — 36415 COLL VENOUS BLD VENIPUNCTURE: CPT

## 2024-08-13 ENCOUNTER — OFFICE VISIT (OUTPATIENT)
Dept: FAMILY MEDICINE | Facility: CLINIC | Age: 59
End: 2024-08-13
Payer: COMMERCIAL

## 2024-08-13 VITALS
SYSTOLIC BLOOD PRESSURE: 132 MMHG | BODY MASS INDEX: 36.57 KG/M2 | WEIGHT: 270 LBS | HEART RATE: 102 BPM | DIASTOLIC BLOOD PRESSURE: 80 MMHG | TEMPERATURE: 98 F | HEIGHT: 72 IN | OXYGEN SATURATION: 98 % | RESPIRATION RATE: 18 BRPM

## 2024-08-13 DIAGNOSIS — F33.9 DEPRESSION, RECURRENT: ICD-10-CM

## 2024-08-13 DIAGNOSIS — Z01.818 PRE-OP EVALUATION: Primary | ICD-10-CM

## 2024-08-13 DIAGNOSIS — F41.9 ANXIETY: ICD-10-CM

## 2024-08-13 PROCEDURE — 3079F DIAST BP 80-89 MM HG: CPT | Mod: CPTII,,, | Performed by: FAMILY MEDICINE

## 2024-08-13 PROCEDURE — 1160F RVW MEDS BY RX/DR IN RCRD: CPT | Mod: CPTII,,, | Performed by: FAMILY MEDICINE

## 2024-08-13 PROCEDURE — 3008F BODY MASS INDEX DOCD: CPT | Mod: CPTII,,, | Performed by: FAMILY MEDICINE

## 2024-08-13 PROCEDURE — 99214 OFFICE O/P EST MOD 30 MIN: CPT | Mod: ,,, | Performed by: FAMILY MEDICINE

## 2024-08-13 PROCEDURE — 3044F HG A1C LEVEL LT 7.0%: CPT | Mod: CPTII,,, | Performed by: FAMILY MEDICINE

## 2024-08-13 PROCEDURE — 3075F SYST BP GE 130 - 139MM HG: CPT | Mod: CPTII,,, | Performed by: FAMILY MEDICINE

## 2024-08-13 PROCEDURE — 1159F MED LIST DOCD IN RCRD: CPT | Mod: CPTII,,, | Performed by: FAMILY MEDICINE

## 2024-08-13 PROCEDURE — 4010F ACE/ARB THERAPY RXD/TAKEN: CPT | Mod: CPTII,,, | Performed by: FAMILY MEDICINE

## 2024-08-13 RX ORDER — CITALOPRAM 20 MG/1
20 TABLET, FILM COATED ORAL DAILY
Qty: 30 TABLET | Refills: 1 | Status: SHIPPED | OUTPATIENT
Start: 2024-08-13 | End: 2024-10-12

## 2024-08-13 RX ORDER — LOSARTAN POTASSIUM 100 MG/1
100 TABLET ORAL DAILY
COMMUNITY
Start: 2024-08-04

## 2024-08-13 NOTE — PROGRESS NOTES
"  Family Medicine    Patient ID: [id number]     Chief Complaint: Pre-op Exam (Surgery clearance for right TKR on 9/10/24 with Dr Juan David Hdz at [facility]. Labs, EKG, CXR done 8/7 at [facility] in chart)      HPI:     Lennox Epps is a 58 y.o. male here today for a surgery clearance. Plans to have right TKR with Dr. Hdz.     Additional complaint of "feeling down", reports being uninterested in everyday activities or conversations and fatigued x4 months; seen May 2024 for similar s/s with plan to have CPAP settings adjusted and start Vistaril daily prn to help with feelings of anxiety, patient reports has not noticed improvement in s/s; denies SI/HI, compliance with Fluoxetine, Abilify, and Vistaril daily. No other complaints today.     Pre-Operative Evaluation:    Risk/Complexity of Surgery:   []  High risk (> 5% MI risk): cardiac and aortic and peripheral vascular surgery   [x]  Intermediate risk (1-5% MI risk): head and neck (including CEA), intraperitoneal, intrathoracic, orthopedic, prostate   []  Low risk (1% MI risk): endoscopic procedures, cataract surgery, breast surgery     Current Medical Problems: Is EKG needed?   [] Active CVD (Unstable Angina, Class III or IV Angina, recent MI in past 30 days, decompensated heart failure, SVT>100, High Grade AV block, mobitz type 2 AV block, symptomatic bradycardia or VT, severe aortic stenosis, symptomatic mitral stenosis)  [] Creatinine > 2.0   [] DM on insulin   [x] QT prolonging drugs (I.e - antiarrythmics, antipsychotics/SSRIs, flouroquinolones)  [x] Hx of abnormal EKG   [x] HTN   [] No active CVD, creatinine > 2.0, DM on insulin - therefore no ECG required.     Functional Status:  < 4 METs (can do light housework (dishwashing), can walk two blocks on level ground, cannot climb a flight of stairs, walk up a hill, or run)   []  Low risk procedure: no testing required   []  Intermediate/high risk procedure: EKG and stress testing   > 4 METs (can rake leaves, weeding or pushing a " power mower, walking up two flights of stairs)  [x] Low/intermediate risk procedure: no testing required   [] High risk procedure: EKG required     Pregnancy: [x]  No     []  Yes  History of anesthesia problems: [x]  No   []  Yes  Smoking status: [x]  Non-smoker   []  Smoker  Social support:  [x]  Yes   []  No      Past Medical History:   Diagnosis Date    Anxiety 2023    DVT (deep venous thrombosis)     Factor 5 Leiden mutation, heterozygous     GERD (gastroesophageal reflux disease)     Hypertension         Past Surgical History:   Procedure Laterality Date    APPENDECTOMY      BACK SURGERY      COLONOSCOPY      EYE SURGERY  2 years ago    Cateract on both and detached retina    KNEE ARTHROPLASTY Left     SPINE SURGERY  1984    3 lumber surgeries over 30 years ago    TONSILLECTOMY          Social History     Tobacco Use    Smoking status: Former     Current packs/day: 0.00     Average packs/day: 1 pack/day for 16.0 years (16.0 ttl pk-yrs)     Types: Cigarettes     Start date: 1979     Quit date: 1995     Years since quittin.6    Smokeless tobacco: Never    Tobacco comments:     I dont remember   Substance and Sexual Activity    Alcohol use: Not Currently    Drug use: Never    Sexual activity: Yes     Partners: Female     Birth control/protection: None        Current Outpatient Medications   Medication Instructions    albuterol (PROVENTIL/VENTOLIN HFA) 90 mcg/actuation inhaler 1-2 puffs, Inhalation, Every 6 hours PRN, Rescue    amLODIPine (NORVASC) 10 mg, Oral, Every morning    ARIPiprazole (ABILIFY) 15 mg, Oral, Daily    famotidine (PEPCID) 40 mg, Oral, Nightly    furosemide (LASIX) 20 mg, Oral    hydrOXYzine pamoate (VISTARIL) 25 MG Cap TAKE 1 CAPSULE BY MOUTH EVERY DAY AS NEEDED FOR ANXIETY    losartan (COZAAR) 100 mg, Oral, Daily    pantoprazole (PROTONIX) 40 MG tablet TAKE 1 TABLET BY MOUTH EVERY DAY    polyethylene glycol (GLYCOLAX) 17 g, Oral, Daily    sucralfate (CARAFATE) 1 gram  tablet     warfarin (COUMADIN) 7.5 MG tablet TAKE 1 TAB BY MOUTH ONCE DAILY ON ALL DAYS EXCEPT TUESDAY & THURSDAY. TAKE 1&1/2 DAILY ON THOSE DAYS    warfarin (COUMADIN) 1 mg, Oral, Daily    warfarin (COUMADIN) 5 mg, Oral, Daily       Review of patient's allergies indicates:  No Known Allergies     Patient Care Team:  Aron Sullivan MD as PCP - General (Family Medicine)  José George MD as Consulting Physician (Gastroenterology)  Angelo Reddy MD as Consulting Physician (Otolaryngology)  José Lee MD as Consulting Physician (Cardiology)  Corwin Tran Jr., MD as Consulting Physician (Physical Medicine and Rehabilitation)  COSME Hdz MD (Orthopedic Surgery)  Lakia Arrington LPN as Licensed Practical Nurse     Subjective:     ROS    Constitutional: Denies Change in appetite. Denies Chills. Denies Fever. Denies Night sweats.  Eye: Denies Blurred vision. Denies Discharge. Denies Eye pain.  ENT: Denies Decreased hearing. Denies Sore throat. Denies Swollen glands.  Respiratory: Denies Cough. Denies Shortness of breath. Denies Shortness of breath with exertion. Denies Wheezing.  Cardiovascular: DeniesChest pain at rest. Denies Chest pain with exertion. Denies Irregular heartbeat. Denies Palpitations.  Gastrointestinal: Denies Abdominal pain. DeniesDiarrhea. Denies Nausea. Denies Vomiting. Denies Hematemesis or Hematochezia.  Genitourinary: Denies Dysuria. Denies Urinary frequency. Denies Urinary urgency. Denies Blood in urine.  Endocrine: Denies Cold intolerance. Denies Excessive thirst. Denies Heat intolerance. Denies Weight loss. Denies Weight gain.  Musculoskeletal: Denies Painful joints. Denies Weakness.  Integumentary: Denies Rash. Denies Itching. Denies Dry skin.  Neurologic: Denies Dizziness. Denies Fainting. Denies Headache.  Psychiatric: Depression. Anxiety. Denies Suicidal/Homicidal ideations.    All Other ROS: Negative except as stated in HPI.     Objective:     Visit Vitals  BP  "132/80   Pulse 102   Temp 97.5 °F (36.4 °C) (Temporal)   Resp 18   Ht 5' 11.65" (1.82 m)   Wt 122.5 kg (270 lb)   SpO2 98%   BMI 36.97 kg/m²       Physical Exam    General: Alert and oriented, No acute distress.  Head: Normocephalic, Atraumatic.  Eye: Pupils are equal, round and reactive to light, Extraocular movements are intact, Sclera non-icteric.  Ears/Nose/Throat: Normal, Mucosa moist,Clear.  Neck/Thyroid: Supple, Non-tender, No carotid bruit, No palpable thyromegaly or thyroid nodule, No lymphadenopathy, No JVD, Full range of motion.  Respiratory: Clear to auscultation bilaterally; No wheezes, rales or rhonchi,Non-labored respirations, Symmetrical chest wall expansion.  Cardiovascular: Regular rate and rhythm, S1/S2 normal, No murmurs, rubs or gallops.  Gastrointestinal: Soft, Non-tender, Non-distended, Normal bowel sounds, No palpable organomegaly.  Musculoskeletal: Normal range of motion.  Integumentary: Warm, Dry, Intact, No suspicious lesions or rashes.  Extremities: No clubbing, cyanosis or edema  Neurologic: No focal deficits, Cranial Nerves II-XII are grossly intact, Motor strength normal upper and lower extremities, Sensory exam intact.  Psychiatric: Normal interaction, Coherent speech, Depressed mood, Flat affect     Assessment:       ICD-10-CM ICD-9-CM   1. Pre-op evaluation  Z01.818 V72.84   2. Depression, recurrent  F33.9 296.30   3. Anxiety  F41.9 300.00        Plan:     1. Pre-op evaluation  Labs + CXR + EKG on 8/7/24 for PreAdmission with LOS reviewed  Cleared for procedure; patient informed of moderate risk, verbalized understanding and desires to move forward with surgery  Clearance per cardiology (Dr. Lee) on 7/26/24; advised to stop coumadin 5 days prior to procedure with Lovenox bridge (Rx sent per cardio), resume coumadin post-op    2. Depression, recurrent  Uncontrolled  Stop Fluoxetine 40 mg daily  Start Citalopram 20 mg daily; Rx sent  Reviewed side effects and importance of " compliance with medication  Exercise daily as tolerated  Encouraged getting 7-8 hours of uninterrupted sleep per night  Discussed benefits of medication not becoming noticeable until up to 6 weeks from start date  Report any symptoms of suicidal or homicidal ideations immediately, if clinic is closed go to nearest emergency room     3. Anxiety  Controlled  Continue Vistaril 25 mg daily prn  Reviewed side effects  Discussed coping skills to help reduce anxiety  Exercise daily as tolerated  Avoid caffeine, alcohol and stimulants  Encouraged getting 7-8 hours of uninterrupted sleep per night       The patient requires no further testing for the planned procedure as scheduled as all of their chronic conditions are optimally controlled. ASA Class: II - Patient appears to have mild systemic disease, adequately controlled.    Preoperative Medication Adjustment  []  Plavix/Brilinta - Stop 5-7 days prior to surgery, resume with PO intake.   []  NSAIDs - Stop 1-3 days prior.   []  Pradaxa - Stop 2-5 days prior, resume with PO intake.   []  Xarelto - Stop at least 24 hrs prior, resume with PO intake.   [x]  Coumadin Low Thromboembolic Risk (Afib with no embolism in 12 mo, biologic heart valves > 3 mo ago) - Stop 5 days pre-op and restart post-op.   []  Coumadin High Thromboembolic Risk (mechanical heart valve, VTE with hypercoagulable state or < 3 mo ago) - Stop 4 days pre-op and start LMWH, stop LMWH 12-18h pre-op, restart LMWH 6h post-op, restart warfarin with PO intake, stop LMWH when INR = 2.0.   []  Insulin - Continue BASAL insulin at 1/2 dose, hold short-acting and regular insulin until eating again.   []  Oral Hypoglycemics: Hold on day of surgery; hold metformin 48 hours preoperatively.  [x]  AntiHTN: Hold diuretic, hold ace-inhibitors, hold arbs, continue others.  []  Sedative: Hold 24 hours preoperatively.  []  Lithium: Check serum level, hold on day of surgery.  []  Levodopa/Carbidopa: Hold on day of surgery.  []   Estrogen: Hold on day of surgery; resume when patient ambulates.    Risk for surgical site infection secondary to:   []  Smoking   []  Diabetes    [x]  Obesity   []  Malnutrition   []  Chronic Skin Disease         For this patient encounter, I reviewed the Nurse Practitoner's documentation, treatment plan, and medical decision making; and am in agreement.    Follow up in 6 weeks (on 9/24/2024), or if symptoms worsen or fail to improve, for Follow Up, Depression/anxiety.  In addition to their scheduled follow up, the patient has also been instructed to follow up on as needed basis.     Future Appointments   Date Time Provider Department Center   9/25/2024 10:00 AM Aron Sullivan MD [facility] [place] [facility]   11/14/2024  8:00 AM Aron Sullivan MD [facility] [place] [facility]

## 2024-08-25 DIAGNOSIS — D68.2 FACTOR V DEFICIENCY: ICD-10-CM

## 2024-08-25 DIAGNOSIS — I82.5Z1 CHRONIC DEEP VEIN THROMBOSIS (DVT) OF DISTAL VEIN OF RIGHT LOWER EXTREMITY: ICD-10-CM

## 2024-08-26 DIAGNOSIS — D68.2 FACTOR V DEFICIENCY: ICD-10-CM

## 2024-08-26 DIAGNOSIS — I82.5Z1 CHRONIC DEEP VEIN THROMBOSIS (DVT) OF DISTAL VEIN OF RIGHT LOWER EXTREMITY: ICD-10-CM

## 2024-08-26 RX ORDER — WARFARIN SODIUM 5 MG/1
5 TABLET ORAL DAILY
Qty: 90 TABLET | Refills: 1 | Status: SHIPPED | OUTPATIENT
Start: 2024-08-26 | End: 2025-08-26

## 2024-08-26 RX ORDER — WARFARIN 1 MG/1
1 TABLET ORAL
Qty: 90 TABLET | Refills: 1 | Status: SHIPPED | OUTPATIENT
Start: 2024-08-26

## 2024-09-01 DIAGNOSIS — F33.9 DEPRESSION, RECURRENT: ICD-10-CM

## 2024-09-03 RX ORDER — ARIPIPRAZOLE 15 MG/1
15 TABLET ORAL
Qty: 90 TABLET | Refills: 1 | Status: SHIPPED | OUTPATIENT
Start: 2024-09-03

## 2024-09-04 DIAGNOSIS — F33.9 DEPRESSION, RECURRENT: ICD-10-CM

## 2024-09-04 RX ORDER — CITALOPRAM 20 MG/1
20 TABLET, FILM COATED ORAL
Qty: 90 TABLET | Refills: 1 | Status: SHIPPED | OUTPATIENT
Start: 2024-09-04

## 2024-09-09 ENCOUNTER — LAB VISIT (OUTPATIENT)
Dept: LAB | Facility: HOSPITAL | Age: 59
End: 2024-09-09
Attending: FAMILY MEDICINE
Payer: COMMERCIAL

## 2024-09-09 DIAGNOSIS — I82.5Z1 CHRONIC DEEP VEIN THROMBOSIS (DVT) OF DISTAL VEIN OF RIGHT LOWER EXTREMITY: ICD-10-CM

## 2024-09-09 DIAGNOSIS — D68.2 FACTOR V DEFICIENCY: ICD-10-CM

## 2024-09-09 LAB
INR PPP: 1.2
PROTHROMBIN TIME: 12.4 SECONDS (ref 9.1–10.9)

## 2024-09-09 PROCEDURE — 85610 PROTHROMBIN TIME: CPT

## 2024-09-09 PROCEDURE — 36415 COLL VENOUS BLD VENIPUNCTURE: CPT

## 2024-09-10 ENCOUNTER — TELEPHONE (OUTPATIENT)
Dept: FAMILY MEDICINE | Facility: CLINIC | Age: 59
End: 2024-09-10
Payer: COMMERCIAL

## 2024-09-10 DIAGNOSIS — Z92.29 HX OF LONG TERM USE OF BLOOD THINNERS: Primary | ICD-10-CM

## 2024-09-10 DIAGNOSIS — M16.12 PRIMARY OSTEOARTHRITIS OF LEFT HIP: ICD-10-CM

## 2024-09-10 DIAGNOSIS — M17.11 UNILATERAL PRIMARY OSTEOARTHRITIS, RIGHT KNEE: Primary | ICD-10-CM

## 2024-09-10 NOTE — TELEPHONE ENCOUNTER
----- Message from Jaylene Coelho LPN sent at 9/10/2024  9:00 AM CDT -----    ----- Message -----  From: Aron Sullivan MD  Sent: 9/9/2024   1:21 PM CDT  To: Jaylene Coelho LPN    Please inform patient of results.     1. Increase Coumadin by 1 mg.  Repeat INR in 1 week

## 2024-09-12 ENCOUNTER — LAB VISIT (OUTPATIENT)
Dept: LAB | Facility: HOSPITAL | Age: 59
End: 2024-09-12
Attending: FAMILY MEDICINE
Payer: COMMERCIAL

## 2024-09-12 ENCOUNTER — CLINICAL SUPPORT (OUTPATIENT)
Dept: REHABILITATION | Facility: HOSPITAL | Age: 59
End: 2024-09-12
Payer: COMMERCIAL

## 2024-09-12 ENCOUNTER — TELEPHONE (OUTPATIENT)
Dept: FAMILY MEDICINE | Facility: CLINIC | Age: 59
End: 2024-09-12
Payer: COMMERCIAL

## 2024-09-12 DIAGNOSIS — M16.12 PRIMARY OSTEOARTHRITIS OF LEFT HIP: ICD-10-CM

## 2024-09-12 DIAGNOSIS — Z92.29 HX OF LONG TERM USE OF BLOOD THINNERS: ICD-10-CM

## 2024-09-12 DIAGNOSIS — M17.11 UNILATERAL PRIMARY OSTEOARTHRITIS, RIGHT KNEE: Primary | ICD-10-CM

## 2024-09-12 DIAGNOSIS — Z96.651 S/P TKR (TOTAL KNEE REPLACEMENT), RIGHT: ICD-10-CM

## 2024-09-12 DIAGNOSIS — Z92.29 HX OF LONG TERM USE OF BLOOD THINNERS: Primary | ICD-10-CM

## 2024-09-12 LAB
INR PPP: 1
PROTHROMBIN TIME: 10.7 SECONDS (ref 9.1–10.9)

## 2024-09-12 PROCEDURE — 36415 COLL VENOUS BLD VENIPUNCTURE: CPT

## 2024-09-12 PROCEDURE — 97140 MANUAL THERAPY 1/> REGIONS: CPT

## 2024-09-12 PROCEDURE — 97010 HOT OR COLD PACKS THERAPY: CPT

## 2024-09-12 PROCEDURE — 97530 THERAPEUTIC ACTIVITIES: CPT

## 2024-09-12 PROCEDURE — 97110 THERAPEUTIC EXERCISES: CPT

## 2024-09-12 PROCEDURE — 85610 PROTHROMBIN TIME: CPT

## 2024-09-12 PROCEDURE — 97162 PT EVAL MOD COMPLEX 30 MIN: CPT

## 2024-09-12 NOTE — TELEPHONE ENCOUNTER
----- Message from Aron Sullivan MD sent at 9/12/2024  3:52 PM CDT -----  Please inform patient of results.     1. Take Coumadin 10 mg q.day x3 days then back to Coumadin prior dose.  Repeat INR on 09/16/2024

## 2024-09-12 NOTE — PROGRESS NOTES
[facility]  Physical Therapy Initial Evaluation    Name: Lennox Epps  [facility] Number: [id number]    Therapy Diagnosis:   No diagnosis found.  Physician: COSME Hdz MD    Physician Orders: PT Eval and Treat  Medical Diagnosis from Referral: Right TKA  Evaluation Date: 9/12/2024  Authorization Period Expiration: medically necessary  Plan of Care Expiration: 12/12/2024  Visit # / Visits authorized: 0/ medically necessary    Time In: 900  Time Out: 1000  Total Appointment Time (timed & untimed codes): 60 minutes  Total Treatment time (time-based codes) separate from Evaluation: 45 minutes    Surgery: R TKA on 9/10/2024  Orthopedic Precautions: R TKA   Pertinent History: blood clot 15 years ago R lower leg    Subjective   Lennox reports: he feels good. He is referred for rehab following a right TKA on 9/10/2024 by Dr Hdz.  He says he is having some pain, but walking with a RW and bending the knee helps decrease the pain. He has a packet of exercises for home use given by his MD and he is using an ice machine frequently.    He says he had been having trouble with his knee for about 6 months and had begun walking bent over with increased back pain, so decided to do the surgery.      He has been working out for about 10 years and feels that his strength from that has helped get through this surgery.  He works a full time job as a , but is out of work for about 3 months at this time.  His goal is to go back in 2 months.    He has help at home and someone to drive him to therapy.    He has a follow up appointment in 2 weeks.  He was instructed to leave on his bandage for 1 week, then change it to a regular bandage and change as needed after that.    He does have a history of 3 back surgeries between 1982 and 1985, but has had no issues since then. He also reports a history of a blood clot in his right calf about 15 years ago, so he always has residual swelling since  then.        Medical History:   Past Medical History:   Diagnosis Date    Anxiety 4/4/2023    DVT (deep venous thrombosis)     Factor 5 Leiden mutation, heterozygous     GERD (gastroesophageal reflux disease)     Hypertension        Surgical History:   Lennox Epps  has a past surgical history that includes Back surgery; Appendectomy; Tonsillectomy; Knee Arthroplasty (Left); Colonoscopy; Eye surgery (2 years ago); and Spine surgery (02/05/1984).    Medications:   Lennox has a current medication list which includes the following prescription(s): albuterol, amlodipine, aripiprazole, citalopram, famotidine, furosemide, hydroxyzine pamoate, losartan, pantoprazole, polyethylene glycol, sucralfate, warfarin, warfarin, and warfarin.    Allergies:   Review of patient's allergies indicates:  No Known Allergies     CMS Impairment/Limitation/Restriction for FOTO Survey  Therapist reviewed FOTO scores for Lennox Epps on 9/12/2024. FOTO documents entered into [employer] - see Media section.  Patient's Physical FS Primary Measure: 46  Risk Adjusted Statistical FOTO: 30  Limitation Score: 54%  Category: Mobility  Jr Abel: 54.8% functional    Objective     BP = 144/78, HR = 87  Eval 9/12/2024     Pain    6/10 at this time at rest, pain meds about 45 min ago  8/10 at worse   Posture      Slumped posture, rounded shoulder and forward head   Appearance    Slightly overweight well nourished male looking very well considering such recent surgery. Swelling and edema with ecchymotic bruising throughout right knee and lower leg.   Palpation    Right - none    Left - slight soreness w calf squeeze    Circumference:    Calf = 44.2 cm L, 48.2 R  Knee = 40.5 L, 45.5 R  Superior patella = 45.0 L, 51.5 R     Gait    Ambulates with flexed posture and RW for support. He reports fatigue w walking.     ROM knee    Right - 88 flex, -3 ext    Left - 123 flex, 0 ext     Flexibility    Hamstring: mod restr L, max R    Gastroc: min restr L, mod R      Strength    HIP FLX - Right 3/5, Left 5/5  HIP EXT - Right 3/5, Left 5/5  HIP ER - Right 3/5, Left 5/5  HIP IR - Right 3/5, Left 5/5  HIP ABD - Right 3-/5, Left 5/5  HIP ADD - Right 3/5, Left 5/5  KNEE FLX - Right 3-/5, Left 5/5  KNEE EXT - Right 3-/5, Left 5/5  ANKLE DF - Right 4/5, Left 4/5  ANKLE PF - Right 3/5, Left 4/5       Dermatomes      No numbness or tingling.   Functional Testing    5X STS = UNABLE DUE TO FATIGUE  2 MWT = 155' w RW    Balance    Tandem R forward = NT  Tandem L forward = NT                        TREATMENT     Lennox received the treatments listed below:       Time Activities   Manual 10 min STM and stretching   TherAct 10 min Patient education and instruction   TherEx 15 min TKA strengthening   Gait     Neuro Re-ed     Modalities 10 min Ice and elevation at end of session   E-Stim     Dry Needling     Canalith Repositioning       Home Exercises and Patient Education Provided    Education provided:   -Plan of care, HEP, walking    Written Home Exercises Provided: Patient instructed to cont prior HEP.  Exercises were reviewed and Lennox was able to demonstrate them prior to the end of the session.  [place] demonstrated good  understanding of the education provided.     See EMR under Patient Instructions for exercises provided  9/12/2024 .    Assessment   Lennox is a 58 y.o. male referred to outpatient Physical Therapy with a medical diagnosis of TKA.     Patient presents with limited ROM and strength at right knee consistent with post-op symptoms.  He has limited walking and exercise tolerance and exhibits fatigue during session.  He is dependent on AD at this time for ambulation.    VSS throughout session.    Discussed importance of exercise twice a day, ice and elevation 2-3 times a day, walking hourly and rest between to allow healing.  Patient demonstrates and states understanding and appears compliant and motivated. Encouraged frequent ankle pumps and to adhere to MD instruction regarding  bandage protocol.    Patient will benefit from skilled outpatient Physical Therapy to address the deficits stated above and in the chart below, provide education, and to maximize patient's level of independence.    Patient prognosis is Excellent.     Plan of care discussed with patient: Yes  Patient's spiritual, cultural and educational needs considered and patient is agreeable to the plan of care and goals as stated below:    Anticipated Barriers for therapy: h/o DVT right calf    Goals:  Short Term Goals: 4 weeks   Patient will report at least 10% increase in functional capacity from initial LEFS score to indicate clinically significant functional improvement  Patient will report at least 10% increase on initial FOTO score to indicate clinically significant functional improvement  Patient will report at least 20% reduction in pain.    Long Term Goals: 12 weeks   Patient will report at least 20% increase in functional capacity from initial LEFS score to indicate clinically significant functional improvement  Patient will report at least 20% increase on initial FOTO score to indicate clinically significant functional improvement  Patient will report at least 50% overall perceived improvement since start of therapy.  Patient will demonstrate 0-120* knee ROM  Patient will walk independently w appropriate AD  Patient will demonstrate normal gait pattern on all surfaces    Plan   Plan of care Certification: 9/12/2024 to 12/12/2024.    Outpatient Physical Therapy 2-3 times weekly for 12 weeks to include the following interventions: Gait Training, Manual Therapy, Moist Heat/ Ice, Neuromuscular Re-ed, Patient Education, Self Care, Therapeutic Activities, and Therapeutic Exercise.     Sharri Coelho, PT

## 2024-09-12 NOTE — TELEPHONE ENCOUNTER
Pt had knee surgery Tuesday with Dr Dumont and started back on coumadin last night. Pt INR is 1.0, dr dumont's office told pt to contact you on what to do next. They do have lovenox inj at home unsure if he should take one today or change coumadin dosage.      [phone]

## 2024-09-12 NOTE — PROGRESS NOTES
Please inform patient of results.     1. Take Coumadin 10 mg q.day x3 days then back to Coumadin prior dose.  Repeat INR on 09/16/2024

## 2024-09-13 ENCOUNTER — CLINICAL SUPPORT (OUTPATIENT)
Dept: REHABILITATION | Facility: HOSPITAL | Age: 59
End: 2024-09-13
Payer: COMMERCIAL

## 2024-09-13 DIAGNOSIS — Z96.651 S/P TKR (TOTAL KNEE REPLACEMENT), RIGHT: Primary | ICD-10-CM

## 2024-09-13 PROCEDURE — 97010 HOT OR COLD PACKS THERAPY: CPT

## 2024-09-13 PROCEDURE — 97110 THERAPEUTIC EXERCISES: CPT

## 2024-09-13 PROCEDURE — 97530 THERAPEUTIC ACTIVITIES: CPT

## 2024-09-13 NOTE — PROGRESS NOTES
Physical Therapy Treatment Note     Name: Lennox Epps  Clinic Number: [id number]    Therapy Diagnosis:   Encounter Diagnosis   Name Primary?    S/P TKR (total knee replacement), right Yes     Physician: COSME Hdz MD    Visit Date: 9/13/2024    Physician Orders: PT Eval and Treat  Medical Diagnosis from Referral: Right TKA  Evaluation Date: 9/12/2024  Authorization Period Expiration: medically necessary  Plan of Care Expiration: 12/12/2024  Visit # / Visits authorized: 1/ medically necessary     Time In: 850  Time Out: 943  Total Billable Time: 53 minutes     Surgery: R TKA on 9/10/2024  Orthopedic Precautions: R TKA   Pertinent History: blood clot 15 years ago R lower leg    Subjective     Patient reports: he has been bending his knee at home and it is doing good.  He does still get tired and is only able to walk for a few minutes at a time.    CMS Impairment/Limitation/Restriction for FOTO Survey  Therapist reviewed FOTO scores for Lennox Epps on 9/12/2024. FOTO documents entered into [employer] - see Media section.  Patient's Physical FS Primary Measure: 46  Risk Adjusted Statistical FOTO: 30  Limitation Score: 54%  Category: Mobility  Koos, Jr: 54.8% functional    Objective     BP = 149/83, HR = 86    Eval 9/12/2024      Pain     6/10 at this time at rest, pain meds about 45 min ago  8/10 at worse   Posture        Slumped posture, rounded shoulder and forward head   Appearance     Slightly overweight well nourished male looking very well considering such recent surgery. Swelling and edema with ecchymotic bruising throughout right knee and lower leg.   Palpation     Right - none     Left - slight soreness w calf squeeze     Circumference:     Calf = 44.2 cm L, 48.2 R  Knee = 40.5 L, 45.5 R  Superior patella = 45.0 L, 51.5 R      Gait     Ambulates with flexed posture and RW for support. He reports fatigue w walking.      ROM knee     Right - 88 flex, -3 ext     Left - 123 flex, 0 ext      Flexibility      Hamstring: mod restr L, max R     Gastroc: min restr L, mod R      Strength     HIP FLX - Right 3/5, Left 5/5  HIP EXT - Right 3/5, Left 5/5  HIP ER - Right 3/5, Left 5/5  HIP IR - Right 3/5, Left 5/5  HIP ABD - Right 3-/5, Left 5/5  HIP ADD - Right 3/5, Left 5/5  KNEE FLX - Right 3-/5, Left 5/5  KNEE EXT - Right 3-/5, Left 5/5  ANKLE DF - Right 4/5, Left 4/5  ANKLE PF - Right 3/5, Left 4/5         Dermatomes        No numbness or tingling.   Functional Testing     5X STS = UNABLE DUE TO FATIGUE  2 MWT = 155' w RW     Balance     Tandem R forward = NT  Tandem L forward = NT     Lennox received the following treatment:     Time Activities   Manual     TherAct 20 min Standing functional movement, activity tolerance    TherEx 23 min TKA protocol right   Gait     Neuro Re-ed     Modalities 10 min Cold pack w elevation at end of session   E-Stim     Dry Needling     Canalith Repositioning           Home Exercises Provided and Patient Education Provided     Education provided:   -Plan of care, HEP, walking, nutrition, modalities and elevation for edema control    Assessment     Exercise focus on ROM at right knee with good response.  Able to advance repetitions of exercise and add VMO focused exercises as well.  Slight lag w SLR attempts.  VC for TKE and patient able to demonstrate.    Discussed TKE exercise at home and encouraged continued walking and ice as able.      Endurance remains limited and patient only able to walk 3 minutes before extreme fatigue.  ?anemic?  Encouraged good nutrition w protein and hydration.  Will monitor.    Patient prognosis is Excellent.      Anticipated barriers to physical therapy: none    Goals: Lennox Is progressing well towards his goals.  Short Term Goals: 4 weeks   Patient will report at least 10% increase in functional capacity from initial LEFS score to indicate clinically significant functional improvement  Patient will report at least 10% increase on initial FOTO score to indicate  clinically significant functional improvement  Patient will report at least 20% reduction in pain.     Long Term Goals: 12 weeks   Patient will report at least 20% increase in functional capacity from initial LEFS score to indicate clinically significant functional improvement  Patient will report at least 20% increase on initial FOTO score to indicate clinically significant functional improvement  Patient will report at least 50% overall perceived improvement since start of therapy.  Patient will demonstrate 0-120* knee ROM  Patient will walk independently w appropriate AD  Patient will demonstrate normal gait pattern on all surfaces       Plan     2-3x/week x 12 weeks    Sharri Coelho, PT

## 2024-09-16 ENCOUNTER — CLINICAL SUPPORT (OUTPATIENT)
Dept: REHABILITATION | Facility: HOSPITAL | Age: 59
End: 2024-09-16
Payer: COMMERCIAL

## 2024-09-16 ENCOUNTER — LAB VISIT (OUTPATIENT)
Dept: LAB | Facility: HOSPITAL | Age: 59
End: 2024-09-16
Attending: INTERNAL MEDICINE
Payer: COMMERCIAL

## 2024-09-16 DIAGNOSIS — I82.5Z1 CHRONIC DEEP VEIN THROMBOSIS (DVT) OF DISTAL VEIN OF RIGHT LOWER EXTREMITY: ICD-10-CM

## 2024-09-16 DIAGNOSIS — D68.2 FACTOR V DEFICIENCY: ICD-10-CM

## 2024-09-16 DIAGNOSIS — Z96.651 S/P TKR (TOTAL KNEE REPLACEMENT), RIGHT: Primary | ICD-10-CM

## 2024-09-16 LAB
INR PPP: 2.1
PROTHROMBIN TIME: 21.2 SECONDS (ref 9.1–10.9)

## 2024-09-16 PROCEDURE — 97010 HOT OR COLD PACKS THERAPY: CPT

## 2024-09-16 PROCEDURE — 36415 COLL VENOUS BLD VENIPUNCTURE: CPT

## 2024-09-16 PROCEDURE — 97530 THERAPEUTIC ACTIVITIES: CPT

## 2024-09-16 PROCEDURE — 97110 THERAPEUTIC EXERCISES: CPT

## 2024-09-16 PROCEDURE — 85610 PROTHROMBIN TIME: CPT

## 2024-09-16 NOTE — PROGRESS NOTES
Physical Therapy Treatment Note     Name: Lennox Epps  Clinic Number: [id number]    Therapy Diagnosis:   No diagnosis found.    Physician: COSME Hdz MD    Visit Date: 9/16/2024    Physician Orders: PT Eval and Treat  Medical Diagnosis from Referral: Right TKA  Evaluation Date: 9/12/2024  Authorization Period Expiration: medically necessary  Plan of Care Expiration: 12/12/2024  Visit # / Visits authorized: 2/ medically necessary     Time In: 900  Time Out: 1000  Total Billable Time: 60 minutes     Surgery: R TKA on 9/10/2024  Orthopedic Precautions: R TKA   Pertinent History: blood clot 15 years ago R lower leg    Subjective     Patient reports: he is exhausted.    CMS Impairment/Limitation/Restriction for FOTO Survey  Therapist reviewed FOTO scores for Lennox Epps on 9/12/2024. FOTO documents entered into [employer] - see Media section.  Patient's Physical FS Primary Measure: 46  Risk Adjusted Statistical FOTO: 30  Limitation Score: 54%  Category: Mobility  Koos, Jr: 54.8% functional    Objective     BP = 149/83, HR = 86    Eval 9/12/2024      Pain     6/10 at this time at rest, pain meds about 45 min ago  8/10 at worse   Posture        Slumped posture, rounded shoulder and forward head   Appearance     Slightly overweight well nourished male looking very well considering such recent surgery. Swelling and edema with ecchymotic bruising throughout right knee and lower leg.   Palpation     Right - none     Left - slight soreness w calf squeeze     Circumference:     Calf = 44.2 cm L, 48.2 R  Knee = 40.5 L, 45.5 R  Superior patella = 45.0 L, 51.5 R      Gait     Ambulates with flexed posture and RW for support. He reports fatigue w walking.      ROM knee     Right - 88 flex, -3 ext     Left - 123 flex, 0 ext      Flexibility     Hamstring: mod restr L, max R     Gastroc: min restr L, mod R      Strength     HIP FLX - Right 3/5, Left 5/5  HIP EXT - Right 3/5, Left 5/5  HIP ER - Right 3/5, Left 5/5  HIP IR - Right  3/5, Left 5/5  HIP ABD - Right 3-/5, Left 5/5  HIP ADD - Right 3/5, Left 5/5  KNEE FLX - Right 3-/5, Left 5/5  KNEE EXT - Right 3-/5, Left 5/5  ANKLE DF - Right 4/5, Left 4/5  ANKLE PF - Right 3/5, Left 4/5         Dermatomes        No numbness or tingling.   Functional Testing     5X STS = UNABLE DUE TO FATIGUE  2 MWT = 155' w RW     Balance     Tandem R forward = NT  Tandem L forward = NT     Lennox received the following treatment:     Time Activities   Manual     TherAct 25 min Standing functional movement, activity tolerance    TherEx 25 min TKA protocol right   Gait     Neuro Re-ed     Modalities 10 min Cold pack w elevation at end of session   E-Stim     Dry Needling     Canalith Repositioning           Home Exercises Provided and Patient Education Provided     Education provided:   -Plan of care, HEP, walking, nutrition, modalities and elevation for edema control    Assessment     Exercise focus on ROM at right knee with good response.  Able to advance repetitions of exercise and add VMO focused exercises as well.  Slight lag w SLR attempts.  VC for TKE and patient able to demonstrate.    Discussed TKE exercise at home and encouraged continued walking and ice as able.      Endurance remains limited and patient not able to walk today due to extreme fatigue.  ?anemic?  Sent message to MD. Encouraged good nutrition w protein and hydration.  Will monitor.    Patient prognosis is Excellent.      Anticipated barriers to physical therapy: none    Goals: Lennox Is progressing well towards his goals.  Short Term Goals: 4 weeks   Patient will report at least 10% increase in functional capacity from initial LEFS score to indicate clinically significant functional improvement  Patient will report at least 10% increase on initial FOTO score to indicate clinically significant functional improvement  Patient will report at least 20% reduction in pain.     Long Term Goals: 12 weeks   Patient will report at least 20% increase in  functional capacity from initial LEFS score to indicate clinically significant functional improvement  Patient will report at least 20% increase on initial FOTO score to indicate clinically significant functional improvement  Patient will report at least 50% overall perceived improvement since start of therapy.  Patient will demonstrate 0-120* knee ROM  Patient will walk independently w appropriate AD  Patient will demonstrate normal gait pattern on all surfaces       Plan     2-3x/week x 12 weeks    Sharri Coelho, PT

## 2024-09-17 ENCOUNTER — CLINICAL SUPPORT (OUTPATIENT)
Dept: REHABILITATION | Facility: HOSPITAL | Age: 59
End: 2024-09-17
Payer: COMMERCIAL

## 2024-09-17 DIAGNOSIS — Z96.651 S/P TKR (TOTAL KNEE REPLACEMENT), RIGHT: Primary | ICD-10-CM

## 2024-09-17 DIAGNOSIS — I10 HYPERTENSION, UNSPECIFIED TYPE: ICD-10-CM

## 2024-09-17 PROCEDURE — 97110 THERAPEUTIC EXERCISES: CPT

## 2024-09-17 PROCEDURE — 97010 HOT OR COLD PACKS THERAPY: CPT

## 2024-09-17 PROCEDURE — 97530 THERAPEUTIC ACTIVITIES: CPT

## 2024-09-17 RX ORDER — AMLODIPINE BESYLATE 10 MG/1
10 TABLET ORAL EVERY MORNING
Qty: 90 TABLET | Refills: 1 | Status: SHIPPED | OUTPATIENT
Start: 2024-09-17 | End: 2025-01-15

## 2024-09-19 ENCOUNTER — CLINICAL SUPPORT (OUTPATIENT)
Dept: REHABILITATION | Facility: HOSPITAL | Age: 59
End: 2024-09-19
Payer: COMMERCIAL

## 2024-09-19 DIAGNOSIS — Z96.651 S/P TKR (TOTAL KNEE REPLACEMENT), RIGHT: Primary | ICD-10-CM

## 2024-09-19 PROCEDURE — 97010 HOT OR COLD PACKS THERAPY: CPT

## 2024-09-19 PROCEDURE — 97110 THERAPEUTIC EXERCISES: CPT

## 2024-09-19 PROCEDURE — 97530 THERAPEUTIC ACTIVITIES: CPT

## 2024-09-19 NOTE — PROGRESS NOTES
Physical Therapy Treatment Note     Name: Lennox Epps  Clinic Number: [id number]    Therapy Diagnosis:   No diagnosis found.    Physician: COSME Hdz MD    Visit Date: 9/19/2024    Physician Orders: PT Eval and Treat  Medical Diagnosis from Referral: Right TKA  Evaluation Date: 9/12/2024  Authorization Period Expiration: medically necessary  Plan of Care Expiration: 12/12/2024  Visit # / Visits authorized: 3/ medically necessary     Time In: 845  Time Out: 945  Total Billable Time: 60 minutes     Surgery: R TKA on 9/10/2024  Orthopedic Precautions: R TKA   Pertinent History: blood clot 15 years ago R lower leg    Subjective     Patient reports: he continues to feel exhausted so he is still not walking much at home.  He does say the leg feels so much better today since he changed his dressing yesterday.        CMS Impairment/Limitation/Restriction for FOTO Survey  Therapist reviewed FOTO scores for Lennox Epps on 9/12/2024. FOTO documents entered into [employer] - see Media section.  Patient's Physical FS Primary Measure: 46  Risk Adjusted Statistical FOTO: 30  Limitation Score: 54%  Category: Mobility  Koos, Jr: 54.8% functional    Objective     The appearance of the right LE appears to have less swelling today.  He does, however, have a reddened spot on his lower leg with slight pitting edema that appears like a cellulitis patch.  He says this is normal to happen since his DVT years ago.    Eval 9/12/2024      Pain     6/10 at this time at rest, pain meds about 45 min ago  8/10 at worse   Posture        Slumped posture, rounded shoulder and forward head   Appearance     Slightly overweight well nourished male looking very well considering such recent surgery. Swelling and edema with ecchymotic bruising throughout right knee and lower leg.    9/17/2024 - Darkened skin and 1+ pitting edema at right lower leg toward ankle.  Patient says this has been darkened since his DVT years ago.   Palpation     Right - none      Left - slight soreness w calf squeeze     Circumference:     Calf = 44.2 cm L, 48.2 R  Knee = 40.5 L, 45.5 R  Superior patella = 45.0 L, 51.5 R      Gait     Ambulates with flexed posture and RW for support. He reports fatigue w walking.      ROM knee     Right - 88 flex, -3 ext     Left - 123 flex, 0 ext      Flexibility     Hamstring: mod restr L, max R     Gastroc: min restr L, mod R      Strength     HIP FLX - Right 3/5, Left 5/5  HIP EXT - Right 3/5, Left 5/5  HIP ER - Right 3/5, Left 5/5  HIP IR - Right 3/5, Left 5/5  HIP ABD - Right 3-/5, Left 5/5  HIP ADD - Right 3/5, Left 5/5  KNEE FLX - Right 3-/5, Left 5/5  KNEE EXT - Right 3-/5, Left 5/5  ANKLE DF - Right 4/5, Left 4/5  ANKLE PF - Right 3/5, Left 4/5         Dermatomes        No numbness or tingling.   Functional Testing     5X STS = UNABLE DUE TO FATIGUE  2 MWT = 155' w RW     Balance     Tandem R forward = NT  Tandem L forward = NT     Lennox received the following treatment:     Time Activities   Manual     TherAct 25 min Standing functional movement, activity tolerance    TherEx 25 min TKA protocol right   Gait     Neuro Re-ed     Modalities 10 min Cold pack w elevation at end of session   E-Stim     Dry Needling     Canalith Repositioning           Home Exercises Provided and Patient Education Provided     Education provided:   -Plan of care, HEP, walking, nutrition, modalities and elevation for edema control    Assessment     Exercise focus on ROM at right knee with good response.  Able to advance repetitions of exercise and continued VMO focused exercises as well.  Less lag w SLR attempts, and able to do them independently.  VC for TKE and patient able to demonstrate.    Discussed TKE exercise at home and strongly encouraged walking frequently during the day with ice and elevation after.      Endurance remains limited, but patient able to walk 10 minutes today with increased ease. VSS throughout.    Monitoring swelling at lower leg.  It improves  as exercises progress and patient reports improved symptoms with walking and exercise.    Did leave a message at Dr Hdz's office with his medical assistant's (Any) voice mail regarding the increased redness at his lower leg today.      Patient prognosis is Excellent.      Anticipated barriers to physical therapy: none    Goals: Lennox Is progressing well towards his goals.  Short Term Goals: 4 weeks   Patient will report at least 10% increase in functional capacity from initial LEFS score to indicate clinically significant functional improvement  Patient will report at least 10% increase on initial FOTO score to indicate clinically significant functional improvement  Patient will report at least 20% reduction in pain.     Long Term Goals: 12 weeks   Patient will report at least 20% increase in functional capacity from initial LEFS score to indicate clinically significant functional improvement  Patient will report at least 20% increase on initial FOTO score to indicate clinically significant functional improvement  Patient will report at least 50% overall perceived improvement since start of therapy.  Patient will demonstrate 0-120* knee ROM  Patient will walk independently w appropriate AD  Patient will demonstrate normal gait pattern on all surfaces       Plan     2-3x/week x 12 weeks    Sharri Coelho, PT

## 2024-09-23 ENCOUNTER — CLINICAL SUPPORT (OUTPATIENT)
Dept: REHABILITATION | Facility: HOSPITAL | Age: 59
End: 2024-09-23
Payer: COMMERCIAL

## 2024-09-23 DIAGNOSIS — Z96.651 S/P TKR (TOTAL KNEE REPLACEMENT), RIGHT: Primary | ICD-10-CM

## 2024-09-23 PROCEDURE — 97530 THERAPEUTIC ACTIVITIES: CPT

## 2024-09-23 PROCEDURE — 97110 THERAPEUTIC EXERCISES: CPT

## 2024-09-23 PROCEDURE — 97010 HOT OR COLD PACKS THERAPY: CPT

## 2024-09-23 NOTE — PROGRESS NOTES
Physical Therapy Treatment Note     Name: Lennox Epps  Clinic Number: [id number]    Therapy Diagnosis:   Encounter Diagnosis   Name Primary?    S/P TKR (total knee replacement), right Yes       Physician: COSME Hdz MD    Visit Date: 9/23/2024    Physician Orders: PT Eval and Treat  Medical Diagnosis from Referral: Right TKA  Evaluation Date: 9/12/2024  Authorization Period Expiration: medically necessary  Plan of Care Expiration: 12/12/2024  Visit # / Visits authorized: 3/ medically necessary     Time In: 841  Time Out: 940  Total Billable Time: 59 minutes     Surgery: R TKA on 9/10/2024  Orthopedic Precautions: R TKA   Pertinent History: blood clot 15 years ago R lower leg    Subjective     Patient reports: he ended up seeing the doctor Friday and had an ultrasound that was negative.  He did ice and elevate the leg more this weekend, but did less exercise.  Swelling is improved today.      CMS Impairment/Limitation/Restriction for FOTO Survey  Therapist reviewed FOTO scores for Lennox Epps on 9/12/2024. FOTO documents entered into [employer] - see Media section.  Patient's Physical FS Primary Measure: 46  Risk Adjusted Statistical FOTO: 30  Limitation Score: 54%  Category: Mobility  Koos, Jr: 54.8% functional    Objective       Eval 9/12/2024      Pain     6/10 at this time at rest, pain meds about 45 min ago  8/10 at worse   Posture        Slumped posture, rounded shoulder and forward head   Appearance     Slightly overweight well nourished male looking very well considering such recent surgery. Swelling and edema with ecchymotic bruising throughout right knee and lower leg.    9/17/2024 - Darkened skin and 1+ pitting edema at right lower leg toward ankle.  Patient says this has been darkened since his DVT years ago.   Palpation     Right - none     Left - slight soreness w calf squeeze     Circumference:     Calf = 44.2 cm L, 48.2 R  Knee = 40.5 L, 45.5 R  Superior patella = 45.0 L, 51.5 R      Gait      Ambulates with flexed posture and RW for support. He reports fatigue w walking.      ROM knee     Right - 88 flex, -3 ext     Left - 123 flex, 0 ext      Flexibility     Hamstring: mod restr L, max R     Gastroc: min restr L, mod R      Strength     HIP FLX - Right 3/5, Left 5/5  HIP EXT - Right 3/5, Left 5/5  HIP ER - Right 3/5, Left 5/5  HIP IR - Right 3/5, Left 5/5  HIP ABD - Right 3-/5, Left 5/5  HIP ADD - Right 3/5, Left 5/5  KNEE FLX - Right 3-/5, Left 5/5  KNEE EXT - Right 3-/5, Left 5/5  ANKLE DF - Right 4/5, Left 4/5  ANKLE PF - Right 3/5, Left 4/5         Dermatomes        No numbness or tingling.   Functional Testing     5X STS = UNABLE DUE TO FATIGUE  2 MWT = 155' w RW     Balance     Tandem R forward = NT  Tandem L forward = NT     Lennox received the following treatment:     Time Activities   Manual     TherAct 24 min Standing functional movement, activity tolerance    TherEx 25 min TKA protocol right   Gait     Neuro Re-ed     Modalities 10 min Cold pack w elevation at end of session   E-Stim     Dry Needling     Canalith Repositioning           Home Exercises Provided and Patient Education Provided     Education provided:   -Plan of care, HEP, walking, nutrition, modalities and elevation for edema control    Assessment     Exercise focus on ROM at right knee with good response.  Able to advance repetitions of exercise and continued VMO focused exercises as well.  Less lag w SLR attempts, and able to do them independently.  VC for TKE and patient able to demonstrate. Advanced standing functional exercise and continued 10 minute walk with improved ease and tolerance.    Encouraged TKE exercise at home and strongly encouraged walking frequently during the day with ice and elevation after.      Will progress function as able.  Patient in agreement.      Patient prognosis is Excellent.      Anticipated barriers to physical therapy: none    Goals: Lennox Is progressing well towards his goals.  Short Term  Goals: 4 weeks   Patient will report at least 10% increase in functional capacity from initial LEFS score to indicate clinically significant functional improvement  Patient will report at least 10% increase on initial FOTO score to indicate clinically significant functional improvement  Patient will report at least 20% reduction in pain.     Long Term Goals: 12 weeks   Patient will report at least 20% increase in functional capacity from initial LEFS score to indicate clinically significant functional improvement  Patient will report at least 20% increase on initial FOTO score to indicate clinically significant functional improvement  Patient will report at least 50% overall perceived improvement since start of therapy.  Patient will demonstrate 0-120* knee ROM  Patient will walk independently w appropriate AD  Patient will demonstrate normal gait pattern on all surfaces       Plan     2-3x/week x 12 weeks    Sharri Coelho, PT

## 2024-09-24 ENCOUNTER — CLINICAL SUPPORT (OUTPATIENT)
Dept: REHABILITATION | Facility: HOSPITAL | Age: 59
End: 2024-09-24
Payer: COMMERCIAL

## 2024-09-24 DIAGNOSIS — M17.11 UNILATERAL PRIMARY OSTEOARTHRITIS, RIGHT KNEE: ICD-10-CM

## 2024-09-24 DIAGNOSIS — Z96.651 S/P TKR (TOTAL KNEE REPLACEMENT), RIGHT: Primary | ICD-10-CM

## 2024-09-24 DIAGNOSIS — M16.12 PRIMARY OSTEOARTHRITIS OF LEFT HIP: ICD-10-CM

## 2024-09-24 PROCEDURE — 97530 THERAPEUTIC ACTIVITIES: CPT

## 2024-09-24 PROCEDURE — 97010 HOT OR COLD PACKS THERAPY: CPT

## 2024-09-24 PROCEDURE — 97110 THERAPEUTIC EXERCISES: CPT

## 2024-09-24 NOTE — PROGRESS NOTES
Physical Therapy Treatment Note     Name: Lennox Epps  Clinic Number: [id number]    Therapy Diagnosis:   No diagnosis found.      Physician: COSME Hdz MD    Visit Date: 9/24/2024    Physician Orders: PT Eval and Treat  Medical Diagnosis from Referral: Right TKA  Evaluation Date: 9/12/2024  Authorization Period Expiration: medically necessary  Plan of Care Expiration: 12/12/2024  Visit # / Visits authorized: 6/ medically necessary     Time In: 849  Time Out: 952  Total Billable Time: 63 minutes     Surgery: R TKA on 9/10/2024  Orthopedic Precautions: R TKA   Pertinent History: blood clot 15 years ago R lower leg    Subjective     Patient reports: he has been walking in home without AD.  He is feeling good and tolerating increased activity.      CMS Impairment/Limitation/Restriction for FOTO Survey  Therapist reviewed FOTO scores for Lennox Epps on 9/12/2024. FOTO documents entered into [employer] - see Media section.  Patient's Physical FS Primary Measure: 46  Risk Adjusted Statistical FOTO: 30  Limitation Score: 54%  Category: Mobility  Jr Abel: 54.8% functional    Objective       Eval 9/12/2024      Pain     6/10 at this time at rest, pain meds about 45 min ago  8/10 at worse   Posture        Slumped posture, rounded shoulder and forward head   Appearance     Slightly overweight well nourished male looking very well considering such recent surgery. Swelling and edema with ecchymotic bruising throughout right knee and lower leg.    9/17/2024 - Darkened skin and 1+ pitting edema at right lower leg toward ankle.  Patient says this has been darkened since his DVT years ago.   Palpation     Right - none     Left - slight soreness w calf squeeze     Circumference:     Calf = 44.2 cm L, 48.2 R  Knee = 40.5 L, 45.5 R  Superior patella = 45.0 L, 51.5 R      Gait     Ambulates with flexed posture and RW for support. He reports fatigue w walking.      ROM knee     Right - 88 flex, -3 ext     Left - 123 flex, 0 ext       Flexibility     Hamstring: mod restr L, max R     Gastroc: min restr L, mod R      Strength     HIP FLX - Right 3/5, Left 5/5  HIP EXT - Right 3/5, Left 5/5  HIP ER - Right 3/5, Left 5/5  HIP IR - Right 3/5, Left 5/5  HIP ABD - Right 3-/5, Left 5/5  HIP ADD - Right 3/5, Left 5/5  KNEE FLX - Right 3-/5, Left 5/5  KNEE EXT - Right 3-/5, Left 5/5  ANKLE DF - Right 4/5, Left 4/5  ANKLE PF - Right 3/5, Left 4/5         Dermatomes        No numbness or tingling.   Functional Testing     5X STS = UNABLE DUE TO FATIGUE  2 MWT = 155' w RW     Balance     Tandem R forward = NT  Tandem L forward = NT     [place] received the following treatment:     Time Activities   Manual     TherAct 28 min Standing functional movement, activity tolerance    TherEx 25 min TKA protocol right   Gait     Neuro Re-ed     Modalities 10 min Cold pack w elevation at end of session   E-Stim     Dry Needling     Canalith Repositioning           Home Exercises Provided and Patient Education Provided     Education provided:   -Plan of care, HEP, walking, nutrition, modalities and elevation for edema control    Assessment   Patient has completed 6 visits of therapy with focus on ROM at right knee, strength and functional mobility with good response.  He is demonstrating AROM 0-107 with ease.  He has good TKE strength and no pain with motion.  He is able to walk 10 minutes with RW and 7 minutes with cane and good gait pattern. He continues to have limited endurance and experiences extreme fatigue with activity.    Exercise focus on ROM at right knee with good response.  Able to advance repetitions of exercise and advanced standing functional exercise and continued 10 minute walk with improved ease and tolerance. Patient able to walk without AD about 2 minutes then cane 5 more minutes, but does tire out easier than with walker.    Encouraged walking frequently during the day with ice and elevation after.      Will progress function as able.  Patient in  agreement.      Patient prognosis is Excellent.      Anticipated barriers to physical therapy: none    Goals: Lennox Is progressing well towards his goals.  Short Term Goals: 4 weeks   Patient will report at least 10% increase in functional capacity from initial LEFS score to indicate clinically significant functional improvement  Patient will report at least 10% increase on initial FOTO score to indicate clinically significant functional improvement  Patient will report at least 20% reduction in pain.     Long Term Goals: 12 weeks   Patient will report at least 20% increase in functional capacity from initial LEFS score to indicate clinically significant functional improvement  Patient will report at least 20% increase on initial FOTO score to indicate clinically significant functional improvement  Patient will report at least 50% overall perceived improvement since start of therapy.  Patient will demonstrate 0-120* knee ROM  Patient will walk independently w appropriate AD  Patient will demonstrate normal gait pattern on all surfaces       Plan     2-3x/week x 12 weeks    Sharri Coelho, PT

## 2024-09-26 ENCOUNTER — CLINICAL SUPPORT (OUTPATIENT)
Dept: REHABILITATION | Facility: HOSPITAL | Age: 59
End: 2024-09-26
Payer: COMMERCIAL

## 2024-09-26 DIAGNOSIS — M17.11 UNILATERAL PRIMARY OSTEOARTHRITIS, RIGHT KNEE: ICD-10-CM

## 2024-09-26 DIAGNOSIS — Z96.651 S/P TKR (TOTAL KNEE REPLACEMENT), RIGHT: Primary | ICD-10-CM

## 2024-09-26 PROCEDURE — 97010 HOT OR COLD PACKS THERAPY: CPT

## 2024-09-26 PROCEDURE — 97110 THERAPEUTIC EXERCISES: CPT

## 2024-09-26 PROCEDURE — 97530 THERAPEUTIC ACTIVITIES: CPT

## 2024-09-26 NOTE — PROGRESS NOTES
Physical Therapy Treatment Note     Name: Lennox Epps  Clinic Number: [id number]    Therapy Diagnosis:   Encounter Diagnoses   Name Primary?    S/P TKR (total knee replacement), right Yes    Unilateral primary osteoarthritis, right knee          Physician: COSME Hdz MD    Visit Date: 9/26/2024    Physician Orders: PT Eval and Treat  Medical Diagnosis from Referral: Right TKA  Evaluation Date: 9/12/2024  Authorization Period Expiration: medically necessary  Plan of Care Expiration: 12/12/2024  Visit # / Visits authorized: 7/ medically necessary     Time In: 840  Time Out: 935  Total Billable Time: 55 minutes     Surgery: R TKA on 9/10/2024  Orthopedic Precautions: R TKA   Pertinent History: blood clot 15 years ago R lower leg    Subjective     Patient reports: he had an appointment yesterday and MD removed dressing.  MD was very pleased with ROM and not concerned about redness throughout lower leg.  Patient did not talk with him about extreme fatigue, he will talk w Dr Sullivan about that tomorrow at his appointment.      CMS Impairment/Limitation/Restriction for FOTO Survey  Therapist reviewed FOTO scores for Lennox Epps on 9/12/2024. FOTO documents entered into [employer] - see Media section.  Patient's Physical FS Primary Measure: 46  Risk Adjusted Statistical FOTO: 30  Limitation Score: 54%  Category: Mobility  Koos, Jr: 54.8% functional    Objective       Eval 9/12/2024      Pain     6/10 at this time at rest, pain meds about 45 min ago  8/10 at worse   Posture        Slumped posture, rounded shoulder and forward head   Appearance     Slightly overweight well nourished male looking very well considering such recent surgery. Swelling and edema with ecchymotic bruising throughout right knee and lower leg.    9/17/2024 - Darkened skin and 1+ pitting edema at right lower leg toward ankle.  Patient says this has been darkened since his DVT years ago.   Palpation     Right - none     Left - slight soreness w calf  squeeze     Circumference:     Calf = 44.2 cm L, 48.2 R  Knee = 40.5 L, 45.5 R  Superior patella = 45.0 L, 51.5 R      Gait     Ambulates with flexed posture and RW for support. He reports fatigue w walking.      ROM knee     Right - 88 flex, -3 ext     Left - 123 flex, 0 ext      Flexibility     Hamstring: mod restr L, max R     Gastroc: min restr L, mod R      Strength     HIP FLX - Right 3/5, Left 5/5  HIP EXT - Right 3/5, Left 5/5  HIP ER - Right 3/5, Left 5/5  HIP IR - Right 3/5, Left 5/5  HIP ABD - Right 3-/5, Left 5/5  HIP ADD - Right 3/5, Left 5/5  KNEE FLX - Right 3-/5, Left 5/5  KNEE EXT - Right 3-/5, Left 5/5  ANKLE DF - Right 4/5, Left 4/5  ANKLE PF - Right 3/5, Left 4/5         Dermatomes        No numbness or tingling.   Functional Testing     5X STS = UNABLE DUE TO FATIGUE  2 MWT = 155' w RW     Balance     Tandem R forward = NT  Tandem L forward = NT     Lennox received the following treatment:     Time Activities   Manual     TherAct 20 min Standing functional movement, activity tolerance    TherEx 25 min TKA protocol right   Gait     Neuro Re-ed     Modalities 10 min Cold pack w elevation at end of session   E-Stim     Dry Needling     Canalith Repositioning           Home Exercises Provided and Patient Education Provided     Education provided:   -Plan of care, HEP, walking, nutrition, modalities and elevation for edema control    Assessment     Exercise focus on ROM at right knee with good response.  Able to advance repetitions of exercise and advanced standing functional exercise today.  Patient had difficulty with 10 minute walk today, requiring several rest breaks.  HR was 128 during walk. AROM to 117* flexion with ease.    Encouraged patient to not forget to discuss his extreme fatigue with Dr Sullivan tomorrow.       Will progress function as able.  Patient in agreement.      Patient prognosis is Excellent.      Anticipated barriers to physical therapy: none    Goals: [place] Is progressing well  towards his goals.  Short Term Goals: 4 weeks   Patient will report at least 10% increase in functional capacity from initial LEFS score to indicate clinically significant functional improvement  Patient will report at least 10% increase on initial FOTO score to indicate clinically significant functional improvement  Patient will report at least 20% reduction in pain.     Long Term Goals: 12 weeks   Patient will report at least 20% increase in functional capacity from initial LEFS score to indicate clinically significant functional improvement  Patient will report at least 20% increase on initial FOTO score to indicate clinically significant functional improvement  Patient will report at least 50% overall perceived improvement since start of therapy.  Patient will demonstrate 0-120* knee ROM  Patient will walk independently w appropriate AD  Patient will demonstrate normal gait pattern on all surfaces       Plan     2-3x/week x 12 weeks    Sharri Coelho, PT

## 2024-09-27 ENCOUNTER — OFFICE VISIT (OUTPATIENT)
Dept: FAMILY MEDICINE | Facility: CLINIC | Age: 59
End: 2024-09-27
Payer: COMMERCIAL

## 2024-09-27 VITALS
SYSTOLIC BLOOD PRESSURE: 120 MMHG | HEART RATE: 122 BPM | TEMPERATURE: 97 F | DIASTOLIC BLOOD PRESSURE: 78 MMHG | RESPIRATION RATE: 18 BRPM | HEIGHT: 72 IN | BODY MASS INDEX: 36.3 KG/M2 | WEIGHT: 268 LBS | OXYGEN SATURATION: 98 %

## 2024-09-27 DIAGNOSIS — I10 HYPERTENSION, UNSPECIFIED TYPE: Primary | ICD-10-CM

## 2024-09-27 NOTE — PROGRESS NOTES
"Subjective:      Patient ID: Lennox Epps is a 58 y.o. male.    Chief Complaint: 6 week f/u      Follow-up      Hypertension  - tolerating medication (no side effects)  - no headaches  - patient without any complaints    Review of Systems   Constitutional: Negative.    Respiratory: Negative.     Cardiovascular: Negative.    Gastrointestinal: Negative.    Musculoskeletal:         Recent right total knee replacement with Dr Hdz   Psychiatric/Behavioral: Negative.           Objective:     /78   Pulse (!) 122   Temp 97.2 °F (36.2 °C) (Temporal)   Resp 18   Ht 5' 11.65" (1.82 m)   Wt 121.6 kg (268 lb)   SpO2 98%   BMI 36.70 kg/m²    Physical Exam  Constitutional:       Appearance: Normal appearance.   Cardiovascular:      Rate and Rhythm: Normal rate and regular rhythm.      Heart sounds: Normal heart sounds.   Pulmonary:      Effort: Pulmonary effort is normal.      Breath sounds: Normal breath sounds.   Skin:     Comments: Right knee: healing scar, scab present, no signs of infection   Neurological:      Mental Status: He is alert.   Psychiatric:         Mood and Affect: Mood normal.         Behavior: Behavior normal.         Thought Content: Thought content normal.         Judgment: Judgment normal.             Assessment:     Problem List Items Addressed This Visit          Cardiac/Vascular    Hypertension - Primary        Plan:   1. Hypertension, unspecified type  Controlled  Continue current Rx medication  Return to clinic with any concerns    "

## 2024-09-27 NOTE — PROGRESS NOTES
Physical Therapy Treatment Note     Name: Lennox Epps  Clinic Number: [id number]    Therapy Diagnosis:   Encounter Diagnoses   Name Primary?    S/P TKR (total knee replacement), right Yes    Unilateral primary osteoarthritis, right knee     Primary osteoarthritis of left hip          Physician: COSME Hdz MD    Visit Date: 9/30/2024    Physician Orders: PT Eval and Treat  Medical Diagnosis from Referral: Right TKA  Evaluation Date: 9/12/2024  Authorization Period Expiration: medically necessary  Plan of Care Expiration: 12/12/2024  Visit # / Visits authorized: 8/ medically necessary     Time In: 842  Time Out: 950  Total Billable Time: 68 minutes     Surgery: R TKA on 9/10/2024  Orthopedic Precautions: R TKA   Pertinent History: blood clot 15 years ago R lower leg    Subjective     Patient reports: MD is aware of his poor activity tolerance and cardiologist has followed him for a while now.      CMS Impairment/Limitation/Restriction for FOTO Survey  Therapist reviewed FOTO scores for Lennox Epps on 9/12/2024. FOTO documents entered into [employer] - see Media section.  Patient's Physical FS Primary Measure: 46  Risk Adjusted Statistical FOTO: 30  Limitation Score: 54%  Category: Mobility  Koos, Jr: 54.8% functional    Objective       Eval 9/12/2024      Pain     6/10 at this time at rest, pain meds about 45 min ago  8/10 at worse   Posture        Slumped posture, rounded shoulder and forward head   Appearance     Slightly overweight well nourished male looking very well considering such recent surgery. Swelling and edema with ecchymotic bruising throughout right knee and lower leg.    9/17/2024 - Darkened skin and 1+ pitting edema at right lower leg toward ankle.  Patient says this has been darkened since his DVT years ago.   Palpation     Right - none     Left - slight soreness w calf squeeze     Circumference:     Calf = 44.2 cm L, 48.2 R  Knee = 40.5 L, 45.5 R  Superior patella = 45.0 L, 51.5 R      Gait      Ambulates with flexed posture and RW for support. He reports fatigue w walking.      ROM knee     Right - 88 flex, -3 ext     Left - 123 flex, 0 ext      Flexibility     Hamstring: mod restr L, max R     Gastroc: min restr L, mod R      Strength     HIP FLX - Right 3/5, Left 5/5  HIP EXT - Right 3/5, Left 5/5  HIP ER - Right 3/5, Left 5/5  HIP IR - Right 3/5, Left 5/5  HIP ABD - Right 3-/5, Left 5/5  HIP ADD - Right 3/5, Left 5/5  KNEE FLX - Right 3-/5, Left 5/5  KNEE EXT - Right 3-/5, Left 5/5  ANKLE DF - Right 4/5, Left 4/5  ANKLE PF - Right 3/5, Left 4/5         Dermatomes        No numbness or tingling.   Functional Testing     5X STS = UNABLE DUE TO FATIGUE  2 MWT = 155' w RW     Balance     Tandem R forward = NT  Tandem L forward = NT     [place] received the following treatment:     Time Activities   Manual     TherAct 30 min Standing functional movement, activity tolerance    TherEx 28 min TKA protocol right   Gait     Neuro Re-ed     Modalities 10 min Cold pack w elevation at end of session   E-Stim     Dry Needling     Canalith Repositioning           Home Exercises Provided and Patient Education Provided     Education provided:   -Plan of care, HEP, walking, nutrition, modalities and elevation for edema control    Assessment     Exercise focus on ROM at right knee with good response.  Able to advance repetitions of exercise and advanced standing functional exercise today.  Patient only able to walk 3.5 minutes and HR constantin to 152 w extreme fatigue and sit required.  Patient appears to use excess energy to maintain upright posture that limits his overall activity tolerance.     AROM still great at 0-117 easily.     Will progress function as able within activity tolerance.  Patient in agreement.      Patient prognosis is Excellent.      Anticipated barriers to physical therapy: none    Goals: Lennox Is progressing well towards his goals.  Short Term Goals: 4 weeks   Patient will report at least 10% increase in  functional capacity from initial LEFS score to indicate clinically significant functional improvement  Patient will report at least 10% increase on initial FOTO score to indicate clinically significant functional improvement  Patient will report at least 20% reduction in pain.     Long Term Goals: 12 weeks   Patient will report at least 20% increase in functional capacity from initial LEFS score to indicate clinically significant functional improvement  Patient will report at least 20% increase on initial FOTO score to indicate clinically significant functional improvement  Patient will report at least 50% overall perceived improvement since start of therapy.  Patient will demonstrate 0-120* knee ROM  Patient will walk independently w appropriate AD  Patient will demonstrate normal gait pattern on all surfaces       Plan     2-3x/week x 12 weeks    Sharri Coelho, PT

## 2024-09-30 ENCOUNTER — CLINICAL SUPPORT (OUTPATIENT)
Dept: REHABILITATION | Facility: HOSPITAL | Age: 59
End: 2024-09-30
Payer: COMMERCIAL

## 2024-09-30 DIAGNOSIS — M17.11 UNILATERAL PRIMARY OSTEOARTHRITIS, RIGHT KNEE: ICD-10-CM

## 2024-09-30 DIAGNOSIS — Z96.651 S/P TKR (TOTAL KNEE REPLACEMENT), RIGHT: Primary | ICD-10-CM

## 2024-09-30 DIAGNOSIS — M16.12 PRIMARY OSTEOARTHRITIS OF LEFT HIP: ICD-10-CM

## 2024-09-30 PROCEDURE — 97010 HOT OR COLD PACKS THERAPY: CPT

## 2024-09-30 PROCEDURE — 97110 THERAPEUTIC EXERCISES: CPT

## 2024-09-30 PROCEDURE — 97530 THERAPEUTIC ACTIVITIES: CPT

## 2024-10-01 ENCOUNTER — CLINICAL SUPPORT (OUTPATIENT)
Dept: REHABILITATION | Facility: HOSPITAL | Age: 59
End: 2024-10-01
Payer: COMMERCIAL

## 2024-10-01 DIAGNOSIS — Z96.651 S/P TKR (TOTAL KNEE REPLACEMENT), RIGHT: Primary | ICD-10-CM

## 2024-10-01 PROCEDURE — 97530 THERAPEUTIC ACTIVITIES: CPT

## 2024-10-01 PROCEDURE — 97010 HOT OR COLD PACKS THERAPY: CPT

## 2024-10-01 PROCEDURE — 97110 THERAPEUTIC EXERCISES: CPT

## 2024-10-01 NOTE — PROGRESS NOTES
Physical Therapy Treatment Note     Name: Lennox Epps  Clinic Number: [id number]    Therapy Diagnosis:   No diagnosis found.        Physician: COSME Hdz MD    Visit Date: 10/1/2024    Physician Orders: PT Eval and Treat  Medical Diagnosis from Referral: Right TKA  Evaluation Date: 9/12/2024  Authorization Period Expiration: medically necessary  Plan of Care Expiration: 12/12/2024  Visit # / Visits authorized: 9/ medically necessary     Time In: 852  Time Out: 945  Total Billable Time: 53 minutes     Surgery: R TKA on 9/10/2024  Orthopedic Precautions: R TKA   Pertinent History: blood clot 15 years ago R lower leg    Subjective     Patient reports: no new complaints.      CMS Impairment/Limitation/Restriction for FOTO Survey  Therapist reviewed FOTO scores for Lennox Epps on 9/12/2024. FOTO documents entered into [employer] - see Media section.  Patient's Physical FS Primary Measure: 46  Risk Adjusted Statistical FOTO: 30  Limitation Score: 54%  Category: Mobility  Koos, Jr: 54.8% functional    Objective       Eval 9/12/2024      Pain     6/10 at this time at rest, pain meds about 45 min ago  8/10 at worse   Posture        Slumped posture, rounded shoulder and forward head   Appearance     Slightly overweight well nourished male looking very well considering such recent surgery. Swelling and edema with ecchymotic bruising throughout right knee and lower leg.    9/17/2024 - Darkened skin and 1+ pitting edema at right lower leg toward ankle.  Patient says this has been darkened since his DVT years ago.   Palpation     Right - none     Left - slight soreness w calf squeeze     Circumference:     Calf = 44.2 cm L, 48.2 R  Knee = 40.5 L, 45.5 R  Superior patella = 45.0 L, 51.5 R      Gait     Ambulates with flexed posture and RW for support. He reports fatigue w walking.      ROM knee     Right - 88 flex, -3 ext     Left - 123 flex, 0 ext      Flexibility     Hamstring: mod restr L, max R     Gastroc: min restr L, mod  R      Strength     HIP FLX - Right 3/5, Left 5/5  HIP EXT - Right 3/5, Left 5/5  HIP ER - Right 3/5, Left 5/5  HIP IR - Right 3/5, Left 5/5  HIP ABD - Right 3-/5, Left 5/5  HIP ADD - Right 3/5, Left 5/5  KNEE FLX - Right 3-/5, Left 5/5  KNEE EXT - Right 3-/5, Left 5/5  ANKLE DF - Right 4/5, Left 4/5  ANKLE PF - Right 3/5, Left 4/5         Dermatomes        No numbness or tingling.   Functional Testing     5X STS = UNABLE DUE TO FATIGUE  2 MWT = 155' w RW     Balance     Tandem R forward = NT  Tandem L forward = NT     Lennox received the following treatment:     Time Activities   Manual     TherAct 30 min Standing functional movement, activity tolerance    TherEx 13 min TKA protocol right   Gait     Neuro Re-ed     Modalities 10 min Cold pack w elevation at end of session   E-Stim     Dry Needling     Canalith Repositioning           Home Exercises Provided and Patient Education Provided     Education provided:   -Plan of care, HEP, walking, nutrition, modalities and elevation for edema control    Assessment     Exercise focus on ROM at right knee with good response.  Able to advance repetitions of exercise and advanced standing functional exercise today to include lateral step up and overs.  Patient appears to use excess energy to maintain upright posture that limits his overall activity tolerance. No walking today per patient request.  He says he will walk pushing the grocery cart when he leaves therapy.    AROM still great at 0-119 easily.     Will progress function as able within activity tolerance.  Patient in agreement.      Patient prognosis is Excellent.      Anticipated barriers to physical therapy: none    Goals: Lennox Is progressing well towards his goals.  Short Term Goals: 4 weeks   Patient will report at least 10% increase in functional capacity from initial LEFS score to indicate clinically significant functional improvement  Patient will report at least 10% increase on initial FOTO score to indicate  clinically significant functional improvement  Patient will report at least 20% reduction in pain.     Long Term Goals: 12 weeks   Patient will report at least 20% increase in functional capacity from initial LEFS score to indicate clinically significant functional improvement  Patient will report at least 20% increase on initial FOTO score to indicate clinically significant functional improvement  Patient will report at least 50% overall perceived improvement since start of therapy.  Patient will demonstrate 0-120* knee ROM  Patient will walk independently w appropriate AD  Patient will demonstrate normal gait pattern on all surfaces       Plan     2-3x/week x 12 weeks    Sharri Coelho, PT

## 2024-10-03 ENCOUNTER — CLINICAL SUPPORT (OUTPATIENT)
Dept: REHABILITATION | Facility: HOSPITAL | Age: 59
End: 2024-10-03
Payer: COMMERCIAL

## 2024-10-03 DIAGNOSIS — Z96.651 S/P TKR (TOTAL KNEE REPLACEMENT), RIGHT: Primary | ICD-10-CM

## 2024-10-03 DIAGNOSIS — M16.12 PRIMARY OSTEOARTHRITIS OF LEFT HIP: ICD-10-CM

## 2024-10-03 DIAGNOSIS — M17.11 UNILATERAL PRIMARY OSTEOARTHRITIS, RIGHT KNEE: ICD-10-CM

## 2024-10-03 PROCEDURE — 97010 HOT OR COLD PACKS THERAPY: CPT

## 2024-10-03 PROCEDURE — 97530 THERAPEUTIC ACTIVITIES: CPT

## 2024-10-03 PROCEDURE — 97110 THERAPEUTIC EXERCISES: CPT

## 2024-10-03 NOTE — PROGRESS NOTES
Physical Therapy Treatment Note     Name: Lennox Epps  Clinic Number: [id number]    Therapy Diagnosis:   Encounter Diagnoses   Name Primary?    S/P TKR (total knee replacement), right Yes    Unilateral primary osteoarthritis, right knee     Primary osteoarthritis of left hip            Physician: COSME Hdz MD    Visit Date: 10/3/2024    Physician Orders: PT Eval and Treat  Medical Diagnosis from Referral: Right TKA  Evaluation Date: 9/12/2024  Authorization Period Expiration: medically necessary  Plan of Care Expiration: 12/12/2024  Visit # / Visits authorized: 9/ medically necessary     Time In: 848   Time Out: 942  Total Billable Time: 54 minutes     Surgery: R TKA on 9/10/2024  Orthopedic Precautions: R TKA   Pertinent History: blood clot 15 years ago R lower leg    Subjective     Patient reports: Pt reports experiencing min distal quad discomfort.       CMS Impairment/Limitation/Restriction for FOTO Survey  Therapist reviewed FOTO scores for Lennox Epps on 9/12/2024. FOTO documents entered into [employer] - see Media section.  Patient's Physical FS Primary Measure: 46  Risk Adjusted Statistical FOTO: 30  Limitation Score: 54%  Category: Mobility  Koos, Jr: 54.8% functional    Objective       Eval 9/12/2024      Pain     6/10 at this time at rest, pain meds about 45 min ago  8/10 at worse   Posture        Slumped posture, rounded shoulder and forward head   Appearance     Slightly overweight well nourished male looking very well considering such recent surgery. Swelling and edema with ecchymotic bruising throughout right knee and lower leg.    9/17/2024 - Darkened skin and 1+ pitting edema at right lower leg toward ankle.  Patient says this has been darkened since his DVT years ago.   Palpation     Right - none     Left - slight soreness w calf squeeze     Circumference:     Calf = 44.2 cm L, 48.2 R  Knee = 40.5 L, 45.5 R  Superior patella = 45.0 L, 51.5 R      Gait     Ambulates with flexed posture and RW for  support. He reports fatigue w walking.      ROM knee     Right - 88 flex, -3 ext     Left - 123 flex, 0 ext      Flexibility     Hamstring: mod restr L, max R     Gastroc: min restr L, mod R      Strength     HIP FLX - Right 3/5, Left 5/5  HIP EXT - Right 3/5, Left 5/5  HIP ER - Right 3/5, Left 5/5  HIP IR - Right 3/5, Left 5/5  HIP ABD - Right 3-/5, Left 5/5  HIP ADD - Right 3/5, Left 5/5  KNEE FLX - Right 3-/5, Left 5/5  KNEE EXT - Right 3-/5, Left 5/5  ANKLE DF - Right 4/5, Left 4/5  ANKLE PF - Right 3/5, Left 4/5         Dermatomes        No numbness or tingling.   Functional Testing     5X STS = UNABLE DUE TO FATIGUE  2 MWT = 155' w RW     Balance     Tandem R forward = NT  Tandem L forward = NT     Lennox received the following treatment:     Time Activities   Manual     TherAct 18 min Standing functional movement, activity tolerance    TherEx 25 min TKA protocol right   Gait     Neuro Re-ed     Modalities 10 min Cold pack w elevation at end of session   E-Stim     Dry Needling     Canalith Repositioning           Home Exercises Provided and Patient Education Provided     Education provided:   -Plan of care, HEP, walking, nutrition, modalities and elevation for edema control    Assessment     Pt tolerated session well without c/o pain and min fatigue. Pt experienced increased HR (144) and BP (164/86) along with reports of lightheadedness and nausea as he began standing heel raises and step up activities. Session was stopped at that point and ended with CP to the knee. Pt encouraged to f/u with cardiologist sooner than his regularly scheduled visit in November.     AROM still great at 0-121 easily.     Will progress function as able within activity tolerance.  Patient in agreement.      Patient prognosis is Excellent.      Anticipated barriers to physical therapy: none    Goals: Lennox Is progressing well towards his goals.  Short Term Goals: 4 weeks   Patient will report at least 10% increase in functional  capacity from initial LEFS score to indicate clinically significant functional improvement  Patient will report at least 10% increase on initial FOTO score to indicate clinically significant functional improvement  Patient will report at least 20% reduction in pain.     Long Term Goals: 12 weeks   Patient will report at least 20% increase in functional capacity from initial LEFS score to indicate clinically significant functional improvement  Patient will report at least 20% increase on initial FOTO score to indicate clinically significant functional improvement  Patient will report at least 50% overall perceived improvement since start of therapy.  Patient will demonstrate 0-120* knee ROM  Patient will walk independently w appropriate AD  Patient will demonstrate normal gait pattern on all surfaces       Plan     2-3x/week x 12 weeks    Elio Austin, PT

## 2024-10-07 ENCOUNTER — CLINICAL SUPPORT (OUTPATIENT)
Dept: REHABILITATION | Facility: HOSPITAL | Age: 59
End: 2024-10-07
Payer: COMMERCIAL

## 2024-10-07 DIAGNOSIS — Z96.651 S/P TKR (TOTAL KNEE REPLACEMENT), RIGHT: Primary | ICD-10-CM

## 2024-10-07 DIAGNOSIS — M17.11 UNILATERAL PRIMARY OSTEOARTHRITIS, RIGHT KNEE: ICD-10-CM

## 2024-10-07 PROCEDURE — 97530 THERAPEUTIC ACTIVITIES: CPT

## 2024-10-07 PROCEDURE — 97010 HOT OR COLD PACKS THERAPY: CPT

## 2024-10-07 PROCEDURE — 97110 THERAPEUTIC EXERCISES: CPT

## 2024-10-07 NOTE — PROGRESS NOTES
Physical Therapy Treatment Note     Name: Lennox Epps  Clinic Number: [id number]    Therapy Diagnosis:   Encounter Diagnoses   Name Primary?    S/P TKR (total knee replacement), right Yes    Unilateral primary osteoarthritis, right knee            Physician: COSME Hdz MD    Visit Date: 10/7/2024    Physician Orders: PT Eval and Treat  Medical Diagnosis from Referral: Right TKA  Evaluation Date: 9/12/2024  Authorization Period Expiration: medically necessary  Plan of Care Expiration: 12/12/2024  Visit # / Visits authorized: 9/ medically necessary     Time In: 842   Time Out: 945  Total Billable Time: 63 minutes     Surgery: R TKA on 9/10/2024  Orthopedic Precautions: R TKA   Pertinent History: blood clot 15 years ago R lower leg    Subjective     Patient reports: Pt reports mild pain this morning.  Pt states he did not have any CV issues over the weekend and even noted performing his HEP without complaints. Pt was unable to get in touch with cardiologist, but will attempt again.       CMS Impairment/Limitation/Restriction for FOTO Survey  Therapist reviewed FOTO scores for Lennxo Epps on 9/12/2024. FOTO documents entered into [employer] - see Media section.  Patient's Physical FS Primary Measure: 46  Risk Adjusted Statistical FOTO: 30  Limitation Score: 54%  Category: Mobility  Koos, Jr: 54.8% functional    Objective       Eval 9/12/2024      Pain     6/10 at this time at rest, pain meds about 45 min ago  8/10 at worse   Posture        Slumped posture, rounded shoulder and forward head   Appearance     Slightly overweight well nourished male looking very well considering such recent surgery. Swelling and edema with ecchymotic bruising throughout right knee and lower leg.    9/17/2024 - Darkened skin and 1+ pitting edema at right lower leg toward ankle.  Patient says this has been darkened since his DVT years ago.   Palpation     Right - none     Left - slight soreness w calf squeeze     Circumference:     Calf =  44.2 cm L, 48.2 R  Knee = 40.5 L, 45.5 R  Superior patella = 45.0 L, 51.5 R      Gait     Ambulates with flexed posture and RW for support. He reports fatigue w walking.      ROM knee     Right - 88 flex, -3 ext     Left - 123 flex, 0 ext      Flexibility     Hamstring: mod restr L, max R     Gastroc: min restr L, mod R      Strength     HIP FLX - Right 3/5, Left 5/5  HIP EXT - Right 3/5, Left 5/5  HIP ER - Right 3/5, Left 5/5  HIP IR - Right 3/5, Left 5/5  HIP ABD - Right 3-/5, Left 5/5  HIP ADD - Right 3/5, Left 5/5  KNEE FLX - Right 3-/5, Left 5/5  KNEE EXT - Right 3-/5, Left 5/5  ANKLE DF - Right 4/5, Left 4/5  ANKLE PF - Right 3/5, Left 4/5         Dermatomes        No numbness or tingling.   Functional Testing     5X STS = UNABLE DUE TO FATIGUE  2 MWT = 155' w RW     Balance     Tandem R forward = NT  Tandem L forward = NT     [place] received the following treatment:     Time Activities   Manual     TherAct 23 min Standing functional movement, activity tolerance (activity modification to monitor and improve vitals)   TherEx 30 min TKA protocol right   Gait     Neuro Re-ed     Modalities 10 min Cold pack w elevation at end of session   E-Stim     Dry Needling     Canalith Repositioning           Home Exercises Provided and Patient Education Provided     Education provided:   -Plan of care, HEP, walking, nutrition, modalities and elevation for edema control    Assessment     Pt tolerated did not session well d/t increasing HR and BP during repeated STS. Pt began experiencing 135/95 BP along with feeling nauseous and dizziness. Pt was able to return to baseline with lying trendelenberg and CP to forehead and neck. Pt the attempted to continue with standing treatment, but was unable to as his BP elevated to 141/94 again. Standing/ambulatory therex was not performed again this date.     AROM still great at 0-121 easily.     Will progress function as able within activity tolerance.  Patient in agreement.      Patient  prognosis is Excellent.      Anticipated barriers to physical therapy: none    Goals: Lennox Is progressing well towards his goals.  Short Term Goals: 4 weeks   Patient will report at least 10% increase in functional capacity from initial LEFS score to indicate clinically significant functional improvement  Patient will report at least 10% increase on initial FOTO score to indicate clinically significant functional improvement  Patient will report at least 20% reduction in pain.     Long Term Goals: 12 weeks   Patient will report at least 20% increase in functional capacity from initial LEFS score to indicate clinically significant functional improvement  Patient will report at least 20% increase on initial FOTO score to indicate clinically significant functional improvement  Patient will report at least 50% overall perceived improvement since start of therapy.  Patient will demonstrate 0-120* knee ROM  Patient will walk independently w appropriate AD  Patient will demonstrate normal gait pattern on all surfaces       Plan     2-3x/week x 12 weeks    Elio Austin, PT

## 2024-10-08 ENCOUNTER — CLINICAL SUPPORT (OUTPATIENT)
Dept: REHABILITATION | Facility: HOSPITAL | Age: 59
End: 2024-10-08
Payer: COMMERCIAL

## 2024-10-08 DIAGNOSIS — Z96.651 S/P TKR (TOTAL KNEE REPLACEMENT), RIGHT: Primary | ICD-10-CM

## 2024-10-08 DIAGNOSIS — M16.12 PRIMARY OSTEOARTHRITIS OF LEFT HIP: ICD-10-CM

## 2024-10-08 DIAGNOSIS — M17.11 UNILATERAL PRIMARY OSTEOARTHRITIS, RIGHT KNEE: ICD-10-CM

## 2024-10-08 PROCEDURE — 97116 GAIT TRAINING THERAPY: CPT

## 2024-10-08 PROCEDURE — 97530 THERAPEUTIC ACTIVITIES: CPT

## 2024-10-08 PROCEDURE — 97010 HOT OR COLD PACKS THERAPY: CPT

## 2024-10-08 PROCEDURE — 97110 THERAPEUTIC EXERCISES: CPT

## 2024-10-08 NOTE — PROGRESS NOTES
Physical Therapy Treatment Note     Name: Lennox Epps  Clinic Number: [id number]    Therapy Diagnosis:   Encounter Diagnoses   Name Primary?    S/P TKR (total knee replacement), right Yes    Unilateral primary osteoarthritis, right knee     Primary osteoarthritis of left hip            Physician: COSME Hdz MD    Visit Date: 10/8/2024    Physician Orders: PT Eval and Treat  Medical Diagnosis from Referral: Right TKA  Evaluation Date: 9/12/2024  Authorization Period Expiration: medically necessary  Plan of Care Expiration: 12/12/2024  Visit # / Visits authorized: 9/ medically necessary     Time In: 852   Time Out: 944  Total Billable Time: 62 minutes     Surgery: R TKA on 9/10/2024  Orthopedic Precautions: R TKA   Pertinent History: blood clot 15 years ago R lower leg    Subjective     Patient reports: Pt reports mild pain this morning.  Pt states he did not have any CV issues over the weekend and even noted performing his HEP without complaints. Pt was unable to get in touch with cardiologist, but will attempt again.       CMS Impairment/Limitation/Restriction for FOTO Survey  Therapist reviewed FOTO scores for Lennox Epps on 10/8/2024. FOTO documents entered into [employer] - see Media section.  Patient's Physical FS Primary Measure: 46 (50 on 10/8)  Risk Adjusted Statistical FOTO: 30  Limitation Score: 54%  Category: Mobility  Koos, Jr: 54.8% functional (59.4% on 10/8)    Objective       Eval 9/12/2024    PN 10/8/24   Pain     6/10 at this time at rest, pain meds about 45 min ago  8/10 at worse Pain    4-8/10   Posture        Slumped posture, rounded shoulder and forward head Posture    No changes    Appearance     Slightly overweight well nourished male looking very well considering such recent surgery. Swelling and edema with ecchymotic bruising throughout right knee and lower leg.    9/17/2024 - Darkened skin and 1+ pitting edema at right lower leg toward ankle.  Patient says this has been darkened since his  DVT years ago. Appearance    No changes    Palpation     Right - none     Left - slight soreness w calf squeeze     Circumference:     Calf = 44.2 cm L, 48.2 R  Knee = 40.5 L, 45.5 R  Superior patella = 45.0 L, 51.5 R    Palpation    Right - none     Left - negative     Circumference:     Calf = 41.5 cm L, 45 R  Knee = 39 L, 46 R  Superior patella = 42 L, 49 R   Gait     Ambulates with flexed posture and RW for support. He reports fatigue w walking.    Gait    Flexed posture, no AD, mild antalgia   ROM knee     Right - 88 flex, -3 ext     Left - 123 flex, 0 ext    ROM knee     Right - 121 flex, +1 ext     Left - 123 flex, 0 ext      Flexibility     Hamstring: mod restr L, max R     Gastroc: min restr L, mod R       Strength     HIP FLX - Right 3/5, Left 5/5  HIP EXT - Right 3/5, Left 5/5  HIP ER - Right 3/5, Left 5/5  HIP IR - Right 3/5, Left 5/5  HIP ABD - Right 3-/5, Left 5/5  HIP ADD - Right 3/5, Left 5/5  KNEE FLX - Right 3-/5, Left 5/5  KNEE EXT - Right 3-/5, Left 5/5  ANKLE DF - Right 4/5, Left 4/5  ANKLE PF - Right 3/5, Left 4/5       Strength     HIP FLX - Right 5/5, Left 5/5  HIP EXT - Right 5/5, Left 5/5  HIP ER - Right 5/5, Left 5/5  HIP IR - Right 5/5, Left 5/5  HIP ABD - Right 5/5, Left 5/5  HIP ADD - Right 5/5, Left 5/5  KNEE FLX - Right 5/5, Left 5/5  KNEE EXT - Right 5/5, Left 5/5  ANKLE DF - Right 5/5, Left 4/5  ANKLE PF - Right 5/5, Left 4/5      Dermatomes        No numbness or tingling.    Functional Testing     5X STS = UNABLE DUE TO FATIGUE  2 MWT = 155' w RW     Balance     Tandem R forward = NT  Tandem L forward = NT Functional Testing     5X STS = 11 seconds   2 MWT = 414' no AD     [place] received the following treatment:     Time Activities   Manual     TherAct 25 min Reassessment tasks  Standing functional movement, activity tolerance (activity modification to monitor and improve vitals)   TherEx 17 min TKA protocol right   Gait 10 min Treadmill pawing to normal gait    Neuro Re-ed      Modalities 10 min Cold pack w elevation at end of session   E-Stim     Dry Needling     Canalith Repositioning           Home Exercises Provided and Patient Education Provided     Education provided:   -Plan of care, HEP, walking, nutrition, modalities and elevation for edema control    Assessment     Pt is progressing as expected toward set goals. Pt demonstrates subjective and objective improvements this date. Pt continues to report mild to moderate levels of pain and demonstrates moderate to severe levels of fatigue with abnormal VS responses to increased CV activity. Pt tolerates session well when provided frequent seated rest breaks and encouraged to perform pursed lip breathing. Pt would benefit from continuing his POC to address remaining deficits and would benefit from seeing his cardiologist.     Will progress function as able within activity tolerance.  Patient in agreement.      Patient prognosis is Excellent.      Anticipated barriers to physical therapy: none    Goals: Lennox Is progressing well towards his goals.  Short Term Goals: 4 weeks   Patient will report at least 10% increase in functional capacity from initial LEFS score to indicate clinically significant functional improvement  Patient will report at least 10% increase on initial FOTO score to indicate clinically significant functional improvement  Patient will report at least 20% reduction in pain.     Long Term Goals: 12 weeks   Patient will report at least 20% increase in functional capacity from initial LEFS score to indicate clinically significant functional improvement  Patient will report at least 20% increase on initial FOTO score to indicate clinically significant functional improvement  Patient will report at least 50% overall perceived improvement since start of therapy.  Patient will demonstrate 0-120* knee ROM  Patient will walk independently w appropriate AD  Patient will demonstrate normal gait pattern on all surfaces       Plan      2-3x/week x 12 weeks    Elio Austin, PT

## 2024-10-10 ENCOUNTER — CLINICAL SUPPORT (OUTPATIENT)
Dept: REHABILITATION | Facility: HOSPITAL | Age: 59
End: 2024-10-10
Payer: COMMERCIAL

## 2024-10-10 DIAGNOSIS — Z96.651 S/P TKR (TOTAL KNEE REPLACEMENT), RIGHT: Primary | ICD-10-CM

## 2024-10-10 PROCEDURE — 97140 MANUAL THERAPY 1/> REGIONS: CPT

## 2024-10-10 PROCEDURE — 97530 THERAPEUTIC ACTIVITIES: CPT

## 2024-10-10 PROCEDURE — 97010 HOT OR COLD PACKS THERAPY: CPT

## 2024-10-10 PROCEDURE — 97110 THERAPEUTIC EXERCISES: CPT

## 2024-10-10 NOTE — PROGRESS NOTES
Physical Therapy Treatment Note     Name: Lennox Epps  Clinic Number: [id number]    Therapy Diagnosis:   Encounter Diagnosis   Name Primary?    S/P TKR (total knee replacement), right Yes           Physician: COSME Hdz MD    Visit Date: 10/10/2024    Physician Orders: PT Eval and Treat  Medical Diagnosis from Referral: Right TKA  Evaluation Date: 9/12/2024  Authorization Period Expiration: medically necessary  Plan of Care Expiration: 12/12/2024  Visit # / Visits authorized: 9/ medically necessary     Time In: 842   Time Out: 944  Total Billable Time: 62 minutes     Surgery: R TKA on 9/10/2024  Orthopedic Precautions: R TKA   Pertinent History: blood clot 15 years ago R lower leg    Subjective     Patient reports: Patient reports significant pain at right quad w swelling in the muscle since his last session of therapy.      CMS Impairment/Limitation/Restriction for FOTO Survey  Therapist reviewed FOTO scores for Lennox Epps on 10/8/2024. FOTO documents entered into [email or web address] - see Media section.  Patient's Physical FS Primary Measure: 46 (50 on 10/8)  Risk Adjusted Statistical FOTO: 30  Limitation Score: 54%  Category: Mobility  Koos, Jr: 54.8% functional (59.4% on 10/8)    Objective       Eval 9/12/2024    PN 10/8/24   Pain     6/10 at this time at rest, pain meds about 45 min ago  8/10 at worse Pain    4-8/10   Posture        Slumped posture, rounded shoulder and forward head Posture    No changes    Appearance     Slightly overweight well nourished male looking very well considering such recent surgery. Swelling and edema with ecchymotic bruising throughout right knee and lower leg.    9/17/2024 - Darkened skin and 1+ pitting edema at right lower leg toward ankle.  Patient says this has been darkened since his DVT years ago. Appearance    No changes    Palpation     Right - none     Left - slight soreness w calf squeeze     Circumference:     Calf = 44.2 cm L, 48.2 R  Knee = 40.5 L, 45.5 R  Superior patella =  45.0 L, 51.5 R    Palpation    Right - none     Left - negative     Circumference:     Calf = 41.5 cm L, 45 R  Knee = 39 L, 46 R  Superior patella = 42 L, 49 R   Gait     Ambulates with flexed posture and RW for support. He reports fatigue w walking.    Gait    Flexed posture, no AD, mild antalgia   ROM knee     Right - 88 flex, -3 ext     Left - 123 flex, 0 ext    ROM knee     Right - 121 flex, +1 ext     Left - 123 flex, 0 ext      Flexibility     Hamstring: mod restr L, max R     Gastroc: min restr L, mod R       Strength     HIP FLX - Right 3/5, Left 5/5  HIP EXT - Right 3/5, Left 5/5  HIP ER - Right 3/5, Left 5/5  HIP IR - Right 3/5, Left 5/5  HIP ABD - Right 3-/5, Left 5/5  HIP ADD - Right 3/5, Left 5/5  KNEE FLX - Right 3-/5, Left 5/5  KNEE EXT - Right 3-/5, Left 5/5  ANKLE DF - Right 4/5, Left 4/5  ANKLE PF - Right 3/5, Left 4/5       Strength     HIP FLX - Right 5/5, Left 5/5  HIP EXT - Right 5/5, Left 5/5  HIP ER - Right 5/5, Left 5/5  HIP IR - Right 5/5, Left 5/5  HIP ABD - Right 5/5, Left 5/5  HIP ADD - Right 5/5, Left 5/5  KNEE FLX - Right 5/5, Left 5/5  KNEE EXT - Right 5/5, Left 5/5  ANKLE DF - Right 5/5, Left 4/5  ANKLE PF - Right 5/5, Left 4/5      Dermatomes        No numbness or tingling.    Functional Testing     5X STS = UNABLE DUE TO FATIGUE  2 MWT = 155' w RW     Balance     Tandem R forward = NT  Tandem L forward = NT Functional Testing     5X STS = 11 seconds   2 MWT = 414' no AD     [place] received the following treatment:     Time Activities   Manual 10 min Roller, stretching, cupping at quad   TherAct 15 min Standing functional movement, activity tolerance (activity modification to monitor and improve vitals)   TherEx 27 min TKA protocol right   Gait     Neuro Re-ed     Modalities 10 min Cold pack w elevation at end of session   E-Stim     Dry Needling     Canalith Repositioning           Home Exercises Provided and Patient Education Provided     Education provided:   -Plan of care, HEP,  walking, nutrition, modalities and elevation for edema control    Assessment     Exercise focus on TKA post op rehab protocol.  Did adjust for increased manual stretching, cupping and roller with reports of relief at quad muscle and obvious decrease in spasms.      Elevated pulse with activity as usual.  Limited activity tolerance due to increase in VS.      Will progress function as able within activity tolerance.  Patient in agreement.      Patient prognosis is Excellent.      Anticipated barriers to physical therapy: none    Goals: Lennox Is progressing well towards his goals.  Short Term Goals: 4 weeks   Patient will report at least 10% increase in functional capacity from initial LEFS score to indicate clinically significant functional improvement  Patient will report at least 10% increase on initial FOTO score to indicate clinically significant functional improvement  Patient will report at least 20% reduction in pain.     Long Term Goals: 12 weeks   Patient will report at least 20% increase in functional capacity from initial LEFS score to indicate clinically significant functional improvement  Patient will report at least 20% increase on initial FOTO score to indicate clinically significant functional improvement  Patient will report at least 50% overall perceived improvement since start of therapy.  Patient will demonstrate 0-120* knee ROM  Patient will walk independently w appropriate AD  Patient will demonstrate normal gait pattern on all surfaces       Plan     2-3x/week x 12 weeks    Sharri Coelho, PT

## 2024-10-14 ENCOUNTER — CLINICAL SUPPORT (OUTPATIENT)
Dept: REHABILITATION | Facility: HOSPITAL | Age: 59
End: 2024-10-14
Payer: COMMERCIAL

## 2024-10-14 ENCOUNTER — LAB VISIT (OUTPATIENT)
Dept: LAB | Facility: HOSPITAL | Age: 59
End: 2024-10-14
Attending: FAMILY MEDICINE
Payer: COMMERCIAL

## 2024-10-14 ENCOUNTER — TELEPHONE (OUTPATIENT)
Dept: FAMILY MEDICINE | Facility: CLINIC | Age: 59
End: 2024-10-14
Payer: COMMERCIAL

## 2024-10-14 DIAGNOSIS — I82.5Z1 CHRONIC DEEP VEIN THROMBOSIS (DVT) OF DISTAL VEIN OF RIGHT LOWER EXTREMITY: ICD-10-CM

## 2024-10-14 DIAGNOSIS — D68.2 FACTOR V DEFICIENCY: ICD-10-CM

## 2024-10-14 DIAGNOSIS — Z92.29 HX OF LONG TERM USE OF BLOOD THINNERS: Primary | ICD-10-CM

## 2024-10-14 DIAGNOSIS — Z96.651 S/P TKR (TOTAL KNEE REPLACEMENT), RIGHT: Primary | ICD-10-CM

## 2024-10-14 LAB
INR PPP: 4.9
PROTHROMBIN TIME: 47.6 SECONDS (ref 9.1–10.9)

## 2024-10-14 PROCEDURE — 85610 PROTHROMBIN TIME: CPT

## 2024-10-14 PROCEDURE — 97110 THERAPEUTIC EXERCISES: CPT

## 2024-10-14 PROCEDURE — 97010 HOT OR COLD PACKS THERAPY: CPT

## 2024-10-14 PROCEDURE — 97530 THERAPEUTIC ACTIVITIES: CPT

## 2024-10-14 PROCEDURE — 36415 COLL VENOUS BLD VENIPUNCTURE: CPT

## 2024-10-14 NOTE — TELEPHONE ENCOUNTER
----- Message from Goldie Hernandez NP sent at 10/14/2024  9:44 AM CDT -----  Please inform patient of results.    1. Hold Coumadin. Repeat INR in 2 days. Order placed.

## 2024-10-14 NOTE — PROGRESS NOTES
Physical Therapy Treatment Note     Name: Lennox Epps  Clinic Number: [id number]    Therapy Diagnosis:   Encounter Diagnosis   Name Primary?    S/P TKR (total knee replacement), right Yes           Physician: COSME Hdz MD    Visit Date: 10/14/2024    Physician Orders: PT Eval and Treat  Medical Diagnosis from Referral: Right TKA  Evaluation Date: 9/12/2024  Authorization Period Expiration: medically necessary  Plan of Care Expiration: 12/12/2024  Visit # / Visits authorized: 13/ medically necessary     Time In: 738   Time Out: 832  Total Billable Time: 54 minutes     Surgery: R TKA on 9/10/2024  Orthopedic Precautions: R TKA   Pertinent History: blood clot 15 years ago R lower leg    Subjective     Patient reports: Patient reports no new complaints today.  He is here early due to having blood work this morning. He says he slept til noon yesterday due to persistent fatigue. He also ran a low grade fever yesterday, but has none today.      CMS Impairment/Limitation/Restriction for FOTO Survey  Therapist reviewed FOTO scores for Lennox Epps on 10/8/2024. FOTO documents entered into [employer] - see Media section.  Patient's Physical FS Primary Measure: 46 (50 on 10/8)  Risk Adjusted Statistical FOTO: 30  Limitation Score: 54%  Category: Mobility  Koos, Jr: 54.8% functional (59.4% on 10/8)    Objective       Eval 9/12/2024    PN 10/8/24   Pain     6/10 at this time at rest, pain meds about 45 min ago  8/10 at worse Pain    4-8/10   Posture        Slumped posture, rounded shoulder and forward head Posture    No changes    Appearance     Slightly overweight well nourished male looking very well considering such recent surgery. Swelling and edema with ecchymotic bruising throughout right knee and lower leg.    9/17/2024 - Darkened skin and 1+ pitting edema at right lower leg toward ankle.  Patient says this has been darkened since his DVT years ago. Appearance    No changes    Palpation     Right - none     Left -  slight soreness w calf squeeze     Circumference:     Calf = 44.2 cm L, 48.2 R  Knee = 40.5 L, 45.5 R  Superior patella = 45.0 L, 51.5 R    Palpation    Right - none     Left - negative     Circumference:     Calf = 41.5 cm L, 45 R  Knee = 39 L, 46 R  Superior patella = 42 L, 49 R   Gait     Ambulates with flexed posture and RW for support. He reports fatigue w walking.    Gait    Flexed posture, no AD, mild antalgia   ROM knee     Right - 88 flex, -3 ext     Left - 123 flex, 0 ext    ROM knee     Right - 121 flex, +1 ext     Left - 123 flex, 0 ext      Flexibility     Hamstring: mod restr L, max R     Gastroc: min restr L, mod R       Strength     HIP FLX - Right 3/5, Left 5/5  HIP EXT - Right 3/5, Left 5/5  HIP ER - Right 3/5, Left 5/5  HIP IR - Right 3/5, Left 5/5  HIP ABD - Right 3-/5, Left 5/5  HIP ADD - Right 3/5, Left 5/5  KNEE FLX - Right 3-/5, Left 5/5  KNEE EXT - Right 3-/5, Left 5/5  ANKLE DF - Right 4/5, Left 4/5  ANKLE PF - Right 3/5, Left 4/5       Strength     HIP FLX - Right 5/5, Left 5/5  HIP EXT - Right 5/5, Left 5/5  HIP ER - Right 5/5, Left 5/5  HIP IR - Right 5/5, Left 5/5  HIP ABD - Right 5/5, Left 5/5  HIP ADD - Right 5/5, Left 5/5  KNEE FLX - Right 5/5, Left 5/5  KNEE EXT - Right 5/5, Left 5/5  ANKLE DF - Right 5/5, Left 4/5  ANKLE PF - Right 5/5, Left 4/5      Dermatomes        No numbness or tingling.    Functional Testing     5X STS = UNABLE DUE TO FATIGUE  2 MWT = 155' w RW     Balance     Tandem R forward = NT  Tandem L forward = NT Functional Testing     5X STS = 11 seconds   2 MWT = 414' no AD     [place] received the following treatment:     Time Activities   Manual     TherAct 20 min Standing functional movement, activity tolerance (activity modification to monitor and improve vitals)   TherEx 24 min TKA protocol right   Gait     Neuro Re-ed     Modalities 10 min Cold pack w elevation at end of session   E-Stim     Dry Needling     Canalith Repositioning           Home Exercises  Provided and Patient Education Provided     Education provided:   -Plan of care, HEP, walking, nutrition, modalities and elevation for edema control    Assessment     Exercise focus on TKA post op rehab protocol. Great ROM and improved edema.      Fatigue persists, but able to complete w multiple rest breaks.    Will progress function as able within activity tolerance.  Will decrease frequency this week. Encouraged continued walking and exercise at home. Patient in agreement.      Patient prognosis is Excellent.      Anticipated barriers to physical therapy: none    Goals: Lennox Is progressing well towards his goals.  Short Term Goals: 4 weeks   Patient will report at least 10% increase in functional capacity from initial LEFS score to indicate clinically significant functional improvement  Patient will report at least 10% increase on initial FOTO score to indicate clinically significant functional improvement  Patient will report at least 20% reduction in pain.     Long Term Goals: 12 weeks   Patient will report at least 20% increase in functional capacity from initial LEFS score to indicate clinically significant functional improvement  Patient will report at least 20% increase on initial FOTO score to indicate clinically significant functional improvement  Patient will report at least 50% overall perceived improvement since start of therapy.  Patient will demonstrate 0-120* knee ROM  Patient will walk independently w appropriate AD  Patient will demonstrate normal gait pattern on all surfaces       Plan     2-3x/week x 12 weeks    Sharri Coelho, PT

## 2024-10-14 NOTE — TELEPHONE ENCOUNTER
Notified pt. He wanted to let you know he's been taking a lot of tylenol lately for his knee pain and thinks it may have thrown his levels off. Will repeat in 2 days

## 2024-10-15 DIAGNOSIS — Z11.4 ENCOUNTER FOR SCREENING FOR HIV: ICD-10-CM

## 2024-10-15 DIAGNOSIS — E78.5 HYPERLIPIDEMIA, UNSPECIFIED HYPERLIPIDEMIA TYPE: ICD-10-CM

## 2024-10-15 DIAGNOSIS — Z12.5 SCREENING FOR MALIGNANT NEOPLASM OF PROSTATE: ICD-10-CM

## 2024-10-15 DIAGNOSIS — Z00.00 WELLNESS EXAMINATION: ICD-10-CM

## 2024-10-15 DIAGNOSIS — Z11.59 NEED FOR HEPATITIS C SCREENING TEST: ICD-10-CM

## 2024-10-15 DIAGNOSIS — I10 HYPERTENSION, UNSPECIFIED TYPE: Primary | ICD-10-CM

## 2024-10-16 ENCOUNTER — LAB VISIT (OUTPATIENT)
Dept: LAB | Facility: HOSPITAL | Age: 59
End: 2024-10-16
Payer: COMMERCIAL

## 2024-10-16 ENCOUNTER — TELEPHONE (OUTPATIENT)
Dept: FAMILY MEDICINE | Facility: CLINIC | Age: 59
End: 2024-10-16
Payer: COMMERCIAL

## 2024-10-16 DIAGNOSIS — D68.2 FACTOR V DEFICIENCY: ICD-10-CM

## 2024-10-16 DIAGNOSIS — I82.5Z1 CHRONIC DEEP VEIN THROMBOSIS (DVT) OF DISTAL VEIN OF RIGHT LOWER EXTREMITY: ICD-10-CM

## 2024-10-16 DIAGNOSIS — Z92.29 HX OF LONG TERM USE OF BLOOD THINNERS: Primary | ICD-10-CM

## 2024-10-16 LAB
INR PPP: 2.3
PROTHROMBIN TIME: 22.9 SECONDS (ref 9.1–10.9)

## 2024-10-16 PROCEDURE — 36415 COLL VENOUS BLD VENIPUNCTURE: CPT

## 2024-10-16 PROCEDURE — 85610 PROTHROMBIN TIME: CPT

## 2024-10-17 ENCOUNTER — CLINICAL SUPPORT (OUTPATIENT)
Dept: REHABILITATION | Facility: HOSPITAL | Age: 59
End: 2024-10-17
Payer: COMMERCIAL

## 2024-10-17 DIAGNOSIS — Z96.651 S/P TKR (TOTAL KNEE REPLACEMENT), RIGHT: Primary | ICD-10-CM

## 2024-10-17 PROCEDURE — 97112 NEUROMUSCULAR REEDUCATION: CPT

## 2024-10-17 PROCEDURE — 97530 THERAPEUTIC ACTIVITIES: CPT

## 2024-10-17 PROCEDURE — 97110 THERAPEUTIC EXERCISES: CPT

## 2024-10-17 NOTE — PROGRESS NOTES
Physical Therapy Treatment Note     Name: Lennox Epps  Clinic Number: [id number]    Therapy Diagnosis:   Encounter Diagnosis   Name Primary?    S/P TKR (total knee replacement), right Yes           Physician: COSME Hdz MD    Visit Date: 10/17/2024    Physician Orders: PT Eval and Treat  Medical Diagnosis from Referral: Right TKA  Evaluation Date: 9/12/2024  Authorization Period Expiration: medically necessary  Plan of Care Expiration: 12/12/2024  Visit # / Visits authorized: 15/ medically necessary     Time In: 835   Time Out: 920  Total Billable Time: 45 minutes     Surgery: R TKA on 9/10/2024  Orthopedic Precautions: R TKA   Pertinent History: blood clot 15 years ago R lower leg    Subjective     Patient reports: Patient reports no new complaints today.  He says he continues to have fatigue and wakes up with it each day.  He says his knee is doing great.    CMS Impairment/Limitation/Restriction for FOTO Survey  Therapist reviewed FOTO scores for Lennox Epps on 10/8/2024. FOTO documents entered into [employer] - see Media section.  Patient's Physical FS Primary Measure: 46 (50 on 10/8)  Risk Adjusted Statistical FOTO: 30  Limitation Score: 54%  Category: Mobility  Koos, Jr: 54.8% functional (59.4% on 10/8)    Objective       Eval 9/12/2024    PN 10/8/24   Pain     6/10 at this time at rest, pain meds about 45 min ago  8/10 at worse Pain    4-8/10   Posture        Slumped posture, rounded shoulder and forward head Posture    No changes    Appearance     Slightly overweight well nourished male looking very well considering such recent surgery. Swelling and edema with ecchymotic bruising throughout right knee and lower leg.    9/17/2024 - Darkened skin and 1+ pitting edema at right lower leg toward ankle.  Patient says this has been darkened since his DVT years ago. Appearance    No changes    Palpation     Right - none     Left - slight soreness w calf squeeze     Circumference:     Calf = 44.2 cm L, 48.2 R  Knee  = 40.5 L, 45.5 R  Superior patella = 45.0 L, 51.5 R    Palpation    Right - none     Left - negative     Circumference:     Calf = 41.5 cm L, 45 R  Knee = 39 L, 46 R  Superior patella = 42 L, 49 R   Gait     Ambulates with flexed posture and RW for support. He reports fatigue w walking.    Gait    Flexed posture, no AD, mild antalgia   ROM knee     Right - 88 flex, -3 ext     Left - 123 flex, 0 ext    ROM knee     Right - 121 flex, +1 ext     Left - 123 flex, 0 ext      Flexibility     Hamstring: mod restr L, max R     Gastroc: min restr L, mod R       Strength     HIP FLX - Right 3/5, Left 5/5  HIP EXT - Right 3/5, Left 5/5  HIP ER - Right 3/5, Left 5/5  HIP IR - Right 3/5, Left 5/5  HIP ABD - Right 3-/5, Left 5/5  HIP ADD - Right 3/5, Left 5/5  KNEE FLX - Right 3-/5, Left 5/5  KNEE EXT - Right 3-/5, Left 5/5  ANKLE DF - Right 4/5, Left 4/5  ANKLE PF - Right 3/5, Left 4/5       Strength     HIP FLX - Right 5/5, Left 5/5  HIP EXT - Right 5/5, Left 5/5  HIP ER - Right 5/5, Left 5/5  HIP IR - Right 5/5, Left 5/5  HIP ABD - Right 5/5, Left 5/5  HIP ADD - Right 5/5, Left 5/5  KNEE FLX - Right 5/5, Left 5/5  KNEE EXT - Right 5/5, Left 5/5  ANKLE DF - Right 5/5, Left 4/5  ANKLE PF - Right 5/5, Left 4/5      Dermatomes        No numbness or tingling.    Functional Testing     5X STS = UNABLE DUE TO FATIGUE  2 MWT = 155' w RW     Balance     Tandem R forward = NT  Tandem L forward = NT Functional Testing     5X STS = 11 seconds   2 MWT = 414' no AD     [place] received the following treatment:     Time Activities   Manual     TherAct 15 min Standing functional movement, activity tolerance (activity modification to monitor and improve vitals)   TherEx 20 min TKA protocol right   Gait     Neuro Re-ed 10 min Proprioceptive retraining   Modalities     E-Stim     Dry Needling     Canalith Repositioning           Home Exercises Provided and Patient Education Provided     Education provided:   -Plan of care, HEP, walking, nutrition,  modalities and elevation for edema control    Assessment     Exercise focus on functional standing and stepping and progressive proprioceptive activity. Improved step up ease and good proprioceptive reactions.  SLS remains limited.      Fatigue persists, but able to complete w multiple rest breaks.    Will progress function as able within activity tolerance.  Will decrease frequency this week. Encouraged continued walking and exercise at home. Patient in agreement.      Patient prognosis is Excellent.      Anticipated barriers to physical therapy: none    Goals: Lennox Is progressing well towards his goals.  Short Term Goals: 4 weeks   Patient will report at least 10% increase in functional capacity from initial LEFS score to indicate clinically significant functional improvement  Patient will report at least 10% increase on initial FOTO score to indicate clinically significant functional improvement  Patient will report at least 20% reduction in pain.     Long Term Goals: 12 weeks   Patient will report at least 20% increase in functional capacity from initial LEFS score to indicate clinically significant functional improvement  Patient will report at least 20% increase on initial FOTO score to indicate clinically significant functional improvement  Patient will report at least 50% overall perceived improvement since start of therapy.  Patient will demonstrate 0-120* knee ROM  Patient will walk independently w appropriate AD  Patient will demonstrate normal gait pattern on all surfaces       Plan     2-3x/week x 12 weeks    Sharri Coelho, PT

## 2024-10-19 DIAGNOSIS — K21.9 GASTROESOPHAGEAL REFLUX DISEASE WITHOUT ESOPHAGITIS: ICD-10-CM

## 2024-10-21 ENCOUNTER — CLINICAL SUPPORT (OUTPATIENT)
Dept: REHABILITATION | Facility: HOSPITAL | Age: 59
End: 2024-10-21
Payer: COMMERCIAL

## 2024-10-21 ENCOUNTER — LAB VISIT (OUTPATIENT)
Dept: LAB | Facility: HOSPITAL | Age: 59
End: 2024-10-21
Payer: COMMERCIAL

## 2024-10-21 DIAGNOSIS — Z96.651 S/P TKR (TOTAL KNEE REPLACEMENT), RIGHT: Primary | ICD-10-CM

## 2024-10-21 DIAGNOSIS — I82.5Z1 CHRONIC DEEP VEIN THROMBOSIS (DVT) OF DISTAL VEIN OF RIGHT LOWER EXTREMITY: ICD-10-CM

## 2024-10-21 DIAGNOSIS — D68.2 FACTOR V DEFICIENCY: ICD-10-CM

## 2024-10-21 LAB
INR PPP: 2.1
PROTHROMBIN TIME: 21.1 SECONDS (ref 9.1–10.9)

## 2024-10-21 PROCEDURE — 97112 NEUROMUSCULAR REEDUCATION: CPT

## 2024-10-21 PROCEDURE — 97110 THERAPEUTIC EXERCISES: CPT

## 2024-10-21 PROCEDURE — 36415 COLL VENOUS BLD VENIPUNCTURE: CPT

## 2024-10-21 PROCEDURE — 85610 PROTHROMBIN TIME: CPT

## 2024-10-21 PROCEDURE — 97530 THERAPEUTIC ACTIVITIES: CPT

## 2024-10-21 RX ORDER — PANTOPRAZOLE SODIUM 40 MG/1
TABLET, DELAYED RELEASE ORAL
Qty: 90 TABLET | Refills: 1 | Status: SHIPPED | OUTPATIENT
Start: 2024-10-21

## 2024-10-21 NOTE — PROGRESS NOTES
Physical Therapy Treatment Note     Name: Lennox Epps  Clinic Number: [id number]    Therapy Diagnosis:   Encounter Diagnosis   Name Primary?    S/P TKR (total knee replacement), right Yes           Physician: COSME Hdz MD    Visit Date: 10/21/2024    Physician Orders: PT Eval and Treat  Medical Diagnosis from Referral: Right TKA  Evaluation Date: 9/12/2024  Authorization Period Expiration: medically necessary  Plan of Care Expiration: 12/12/2024  Visit # / Visits authorized: 15/ medically necessary     Time In: 840   Time Out: 915  Total Billable Time: 35 minutes     Surgery: R TKA on 9/10/2024  Orthopedic Precautions: R TKA   Pertinent History: blood clot 15 years ago R lower leg    Subjective     Patient reports: Patient reports no new complaints today.  He says he is feeling a little better today and he had a good weekend.    CMS Impairment/Limitation/Restriction for FOTO Survey  Therapist reviewed FOTO scores for Lennox Epps on 10/8/2024. FOTO documents entered into [employer] - see Media section.  Patient's Physical FS Primary Measure: 46 (50 on 10/8)  Risk Adjusted Statistical FOTO: 30  Limitation Score: 54%  Category: Mobility  Koos, Jr: 54.8% functional (59.4% on 10/8)    Objective       Eval 9/12/2024    PN 10/8/24   Pain     6/10 at this time at rest, pain meds about 45 min ago  8/10 at worse Pain    4-8/10   Posture        Slumped posture, rounded shoulder and forward head Posture    No changes    Appearance     Slightly overweight well nourished male looking very well considering such recent surgery. Swelling and edema with ecchymotic bruising throughout right knee and lower leg.    9/17/2024 - Darkened skin and 1+ pitting edema at right lower leg toward ankle.  Patient says this has been darkened since his DVT years ago. Appearance    No changes    Palpation     Right - none     Left - slight soreness w calf squeeze     Circumference:     Calf = 44.2 cm L, 48.2 R  Knee = 40.5 L, 45.5 R  Superior  patella = 45.0 L, 51.5 R    Palpation    Right - none     Left - negative     Circumference:     Calf = 41.5 cm L, 45 R  Knee = 39 L, 46 R  Superior patella = 42 L, 49 R   Gait     Ambulates with flexed posture and RW for support. He reports fatigue w walking.    Gait    Flexed posture, no AD, mild antalgia   ROM knee     Right - 88 flex, -3 ext     Left - 123 flex, 0 ext    ROM knee     Right - 121 flex, +1 ext     Left - 123 flex, 0 ext      Flexibility     Hamstring: mod restr L, max R     Gastroc: min restr L, mod R       Strength     HIP FLX - Right 3/5, Left 5/5  HIP EXT - Right 3/5, Left 5/5  HIP ER - Right 3/5, Left 5/5  HIP IR - Right 3/5, Left 5/5  HIP ABD - Right 3-/5, Left 5/5  HIP ADD - Right 3/5, Left 5/5  KNEE FLX - Right 3-/5, Left 5/5  KNEE EXT - Right 3-/5, Left 5/5  ANKLE DF - Right 4/5, Left 4/5  ANKLE PF - Right 3/5, Left 4/5       Strength     HIP FLX - Right 5/5, Left 5/5  HIP EXT - Right 5/5, Left 5/5  HIP ER - Right 5/5, Left 5/5  HIP IR - Right 5/5, Left 5/5  HIP ABD - Right 5/5, Left 5/5  HIP ADD - Right 5/5, Left 5/5  KNEE FLX - Right 5/5, Left 5/5  KNEE EXT - Right 5/5, Left 5/5  ANKLE DF - Right 5/5, Left 4/5  ANKLE PF - Right 5/5, Left 4/5      Dermatomes        No numbness or tingling.    Functional Testing     5X STS = UNABLE DUE TO FATIGUE  2 MWT = 155' w RW     Balance     Tandem R forward = NT  Tandem L forward = NT Functional Testing     5X STS = 11 seconds   2 MWT = 414' no AD     Lennox received the following treatment:     Time Activities   Manual     TherAct 15 min Standing functional movement, activity tolerance (activity modification to monitor and improve vitals)   TherEx 10 min TKA protocol right   Gait     Neuro Re-ed 10 min Proprioceptive retraining   Modalities     E-Stim     Dry Needling     Canalith Repositioning           Home Exercises Provided and Patient Education Provided     Education provided:   -Plan of care, HEP, walking, nutrition, modalities and elevation  for edema control    Assessment     Exercise focus on functional standing and stepping and progressive proprioceptive activity. Improved step up ease and good proprioceptive reactions.  SLS remains limited.      AROM is 0-126* with ease.    Fatigue is less, but persists, able to complete exercises w multiple rest breaks.    Will progress function as able within activity tolerance.  Will decrease frequency this week. Encouraged continued walking and exercise at home. Patient in agreement.      Patient prognosis is Excellent.      Anticipated barriers to physical therapy: none    Goals: Lennox Is progressing well towards his goals.  Short Term Goals: 4 weeks   Patient will report at least 10% increase in functional capacity from initial LEFS score to indicate clinically significant functional improvement  Patient will report at least 10% increase on initial FOTO score to indicate clinically significant functional improvement  Patient will report at least 20% reduction in pain.     Long Term Goals: 12 weeks   Patient will report at least 20% increase in functional capacity from initial LEFS score to indicate clinically significant functional improvement  Patient will report at least 20% increase on initial FOTO score to indicate clinically significant functional improvement  Patient will report at least 50% overall perceived improvement since start of therapy.  Patient will demonstrate 0-120* knee ROM  Patient will walk independently w appropriate AD  Patient will demonstrate normal gait pattern on all surfaces       Plan     2-3x/week x 12 weeks    Sharri Coelho, PT

## 2024-10-22 ENCOUNTER — TELEPHONE (OUTPATIENT)
Dept: FAMILY MEDICINE | Facility: CLINIC | Age: 59
End: 2024-10-22
Payer: COMMERCIAL

## 2024-10-22 DIAGNOSIS — D68.2 FACTOR V DEFICIENCY: Primary | ICD-10-CM

## 2024-10-22 DIAGNOSIS — Z92.29 HX OF LONG TERM USE OF BLOOD THINNERS: ICD-10-CM

## 2024-10-22 NOTE — TELEPHONE ENCOUNTER
----- Message from Nurse Mariee sent at 10/22/2024  7:36 AM CDT -----    ----- Message -----  From: Aron Sullivan MD  Sent: 10/21/2024   5:06 PM CDT  To: Jaylene Coelho LPN    Please inform patient of results.     1. Continue current Coumadin dose.  Repeat INR in 2 weeks

## 2024-10-24 ENCOUNTER — CLINICAL SUPPORT (OUTPATIENT)
Dept: REHABILITATION | Facility: HOSPITAL | Age: 59
End: 2024-10-24
Payer: COMMERCIAL

## 2024-10-24 DIAGNOSIS — M17.11 UNILATERAL PRIMARY OSTEOARTHRITIS, RIGHT KNEE: ICD-10-CM

## 2024-10-24 DIAGNOSIS — Z96.651 S/P TKR (TOTAL KNEE REPLACEMENT), RIGHT: Primary | ICD-10-CM

## 2024-10-24 PROCEDURE — 97530 THERAPEUTIC ACTIVITIES: CPT

## 2024-10-24 PROCEDURE — 97110 THERAPEUTIC EXERCISES: CPT

## 2024-10-24 PROCEDURE — 97112 NEUROMUSCULAR REEDUCATION: CPT

## 2024-10-24 NOTE — PROGRESS NOTES
Physical Therapy Treatment Note     Name: Lennox Epps  Clinic Number: [id number]    Therapy Diagnosis:   Encounter Diagnoses   Name Primary?    S/P TKR (total knee replacement), right Yes    Unilateral primary osteoarthritis, right knee            Physician: COSME Hdz MD    Visit Date: 10/24/2024    Physician Orders: PT Eval and Treat  Medical Diagnosis from Referral: Right TKA  Evaluation Date: 9/12/2024  Authorization Period Expiration: medically necessary  Plan of Care Expiration: 12/12/2024  Visit # / Visits authorized: 15/ medically necessary     Time In: 840   Time Out: 920  Total Billable Time: 40 minutes     Surgery: R TKA on 9/10/2024  Orthopedic Precautions: R TKA   Pertinent History: blood clot 15 years ago R lower leg    Subjective     Patient reports: no new complaints    CMS Impairment/Limitation/Restriction for FOTO Survey  Therapist reviewed FOTO scores for Lennox Epps on 10/8/2024. FOTO documents entered into [employer] - see Media section.  Patient's Physical FS Primary Measure: 46 (50 on 10/8)  Risk Adjusted Statistical FOTO: 30  Limitation Score: 54%  Category: Mobility  Koos, Jr: 54.8% functional (59.4% on 10/8)    Objective       Eval 9/12/2024    PN 10/8/24   Pain     6/10 at this time at rest, pain meds about 45 min ago  8/10 at worse Pain    4-8/10   Posture        Slumped posture, rounded shoulder and forward head Posture    No changes    Appearance     Slightly overweight well nourished male looking very well considering such recent surgery. Swelling and edema with ecchymotic bruising throughout right knee and lower leg.    9/17/2024 - Darkened skin and 1+ pitting edema at right lower leg toward ankle.  Patient says this has been darkened since his DVT years ago. Appearance    No changes    Palpation     Right - none     Left - slight soreness w calf squeeze     Circumference:     Calf = 44.2 cm L, 48.2 R  Knee = 40.5 L, 45.5 R  Superior patella = 45.0 L, 51.5 R    Palpation    Right -  none     Left - negative     Circumference:     Calf = 41.5 cm L, 45 R  Knee = 39 L, 46 R  Superior patella = 42 L, 49 R   Gait     Ambulates with flexed posture and RW for support. He reports fatigue w walking.    Gait    Flexed posture, no AD, mild antalgia   ROM knee     Right - 88 flex, -3 ext     Left - 123 flex, 0 ext    ROM knee     Right - 121 flex, +1 ext     Left - 123 flex, 0 ext      Flexibility     Hamstring: mod restr L, max R     Gastroc: min restr L, mod R       Strength     HIP FLX - Right 3/5, Left 5/5  HIP EXT - Right 3/5, Left 5/5  HIP ER - Right 3/5, Left 5/5  HIP IR - Right 3/5, Left 5/5  HIP ABD - Right 3-/5, Left 5/5  HIP ADD - Right 3/5, Left 5/5  KNEE FLX - Right 3-/5, Left 5/5  KNEE EXT - Right 3-/5, Left 5/5  ANKLE DF - Right 4/5, Left 4/5  ANKLE PF - Right 3/5, Left 4/5       Strength     HIP FLX - Right 5/5, Left 5/5  HIP EXT - Right 5/5, Left 5/5  HIP ER - Right 5/5, Left 5/5  HIP IR - Right 5/5, Left 5/5  HIP ABD - Right 5/5, Left 5/5  HIP ADD - Right 5/5, Left 5/5  KNEE FLX - Right 5/5, Left 5/5  KNEE EXT - Right 5/5, Left 5/5  ANKLE DF - Right 5/5, Left 4/5  ANKLE PF - Right 5/5, Left 4/5      Dermatomes        No numbness or tingling.    Functional Testing     5X STS = UNABLE DUE TO FATIGUE  2 MWT = 155' w RW     Balance     Tandem R forward = NT  Tandem L forward = NT Functional Testing     5X STS = 11 seconds   2 MWT = 414' no AD     [place] received the following treatment:     Time Activities   Manual     TherAct 15 min Standing functional movement, activity tolerance (activity modification to monitor and improve vitals)   TherEx 15 min TKA protocol right   Gait     Neuro Re-ed 10 min Proprioceptive retraining   Modalities     E-Stim     Dry Needling     Canalith Repositioning           Home Exercises Provided and Patient Education Provided     Education provided:   -Plan of care, HEP, walking, nutrition, modalities and elevation for edema control    Assessment     Exercise focus  on functional standing and stepping and progressive proprioceptive activity. Improved step up ease and good proprioceptive reactions.  SLS is improving.    Work on pacing, breathing and being aware of fatigue with all activity today.        Fatigue is less, but persists, able to complete exercises w multiple rest breaks.    Will progress function as able within activity tolerance.  Will decrease frequency this week. Encouraged continued walking and exercise at home. Patient in agreement.      Patient prognosis is Excellent.      Anticipated barriers to physical therapy: none    Goals: Lennox Is progressing well towards his goals.  Short Term Goals: 4 weeks   Patient will report at least 10% increase in functional capacity from initial LEFS score to indicate clinically significant functional improvement  Patient will report at least 10% increase on initial FOTO score to indicate clinically significant functional improvement  Patient will report at least 20% reduction in pain.     Long Term Goals: 12 weeks   Patient will report at least 20% increase in functional capacity from initial LEFS score to indicate clinically significant functional improvement  Patient will report at least 20% increase on initial FOTO score to indicate clinically significant functional improvement  Patient will report at least 50% overall perceived improvement since start of therapy.  Patient will demonstrate 0-120* knee ROM  Patient will walk independently w appropriate AD  Patient will demonstrate normal gait pattern on all surfaces       Plan     2-3x/week x 12 weeks    Sharri Coelho, PT

## 2024-10-28 ENCOUNTER — CLINICAL SUPPORT (OUTPATIENT)
Dept: REHABILITATION | Facility: HOSPITAL | Age: 59
End: 2024-10-28
Payer: COMMERCIAL

## 2024-10-28 DIAGNOSIS — Z96.651 S/P TKR (TOTAL KNEE REPLACEMENT), RIGHT: Primary | ICD-10-CM

## 2024-10-28 DIAGNOSIS — M17.11 UNILATERAL PRIMARY OSTEOARTHRITIS, RIGHT KNEE: ICD-10-CM

## 2024-10-28 PROCEDURE — 97530 THERAPEUTIC ACTIVITIES: CPT

## 2024-10-28 PROCEDURE — 97110 THERAPEUTIC EXERCISES: CPT

## 2024-10-28 PROCEDURE — 97112 NEUROMUSCULAR REEDUCATION: CPT

## 2024-10-31 ENCOUNTER — CLINICAL SUPPORT (OUTPATIENT)
Dept: REHABILITATION | Facility: HOSPITAL | Age: 59
End: 2024-10-31
Payer: COMMERCIAL

## 2024-10-31 DIAGNOSIS — Z96.651 S/P TKR (TOTAL KNEE REPLACEMENT), RIGHT: Primary | ICD-10-CM

## 2024-10-31 PROCEDURE — 97110 THERAPEUTIC EXERCISES: CPT

## 2024-10-31 PROCEDURE — 97530 THERAPEUTIC ACTIVITIES: CPT

## 2024-10-31 PROCEDURE — 97112 NEUROMUSCULAR REEDUCATION: CPT

## 2024-11-04 ENCOUNTER — LAB VISIT (OUTPATIENT)
Dept: LAB | Facility: HOSPITAL | Age: 59
End: 2024-11-04
Attending: FAMILY MEDICINE
Payer: COMMERCIAL

## 2024-11-04 ENCOUNTER — TELEPHONE (OUTPATIENT)
Dept: FAMILY MEDICINE | Facility: CLINIC | Age: 59
End: 2024-11-04
Payer: COMMERCIAL

## 2024-11-04 DIAGNOSIS — D68.2 FACTOR V DEFICIENCY: ICD-10-CM

## 2024-11-04 DIAGNOSIS — I82.5Z1 CHRONIC DEEP VEIN THROMBOSIS (DVT) OF DISTAL VEIN OF RIGHT LOWER EXTREMITY: ICD-10-CM

## 2024-11-04 LAB
INR PPP: 2.8
PROTHROMBIN TIME: 27.5 SECONDS (ref 9.1–10.9)

## 2024-11-04 PROCEDURE — 85610 PROTHROMBIN TIME: CPT

## 2024-11-04 PROCEDURE — 36415 COLL VENOUS BLD VENIPUNCTURE: CPT

## 2024-11-04 NOTE — TELEPHONE ENCOUNTER
----- Message from Nurse Mariee sent at 11/4/2024 11:53 AM CST -----    ----- Message -----  From: Aron Sullivan MD  Sent: 11/4/2024  11:53 AM CST  To: Jaylene Coelho LPN    Please inform patient of results.     1. INR therapeutic.  Continue current Coumadin dose.  Repeat INR in 2 weeks

## 2024-11-04 NOTE — PROGRESS NOTES
Please inform patient of results.     1. INR therapeutic.  Continue current Coumadin dose.  Repeat INR in 2 weeks

## 2024-11-11 ENCOUNTER — LAB VISIT (OUTPATIENT)
Dept: LAB | Facility: HOSPITAL | Age: 59
End: 2024-11-11
Attending: FAMILY MEDICINE
Payer: COMMERCIAL

## 2024-11-11 DIAGNOSIS — E78.5 HYPERLIPIDEMIA, UNSPECIFIED HYPERLIPIDEMIA TYPE: ICD-10-CM

## 2024-11-11 DIAGNOSIS — Z11.4 ENCOUNTER FOR SCREENING FOR HIV: ICD-10-CM

## 2024-11-11 DIAGNOSIS — Z00.00 WELLNESS EXAMINATION: ICD-10-CM

## 2024-11-11 DIAGNOSIS — Z11.59 NEED FOR HEPATITIS C SCREENING TEST: ICD-10-CM

## 2024-11-11 DIAGNOSIS — Z12.5 SCREENING FOR MALIGNANT NEOPLASM OF PROSTATE: ICD-10-CM

## 2024-11-11 DIAGNOSIS — I10 HYPERTENSION, UNSPECIFIED TYPE: ICD-10-CM

## 2024-11-11 LAB
ALBUMIN SERPL-MCNC: 3.9 G/DL (ref 3.5–5)
ALBUMIN/GLOB SERPL: 1 RATIO (ref 1.1–2)
ALP SERPL-CCNC: 86 UNIT/L (ref 40–150)
ALT SERPL-CCNC: 19 UNIT/L (ref 0–55)
ANION GAP SERPL CALC-SCNC: 7 MEQ/L
AST SERPL-CCNC: 19 UNIT/L (ref 5–34)
BASOPHILS # BLD AUTO: 0.09 X10(3)/MCL
BASOPHILS NFR BLD AUTO: 1.4 %
BILIRUB SERPL-MCNC: 0.4 MG/DL
BUN SERPL-MCNC: 13 MG/DL (ref 8.4–25.7)
CALCIUM SERPL-MCNC: 9.6 MG/DL (ref 8.4–10.2)
CHLORIDE SERPL-SCNC: 111 MMOL/L (ref 98–107)
CHOLEST SERPL-MCNC: 170 MG/DL
CHOLEST/HDLC SERPL: 5 {RATIO} (ref 0–5)
CO2 SERPL-SCNC: 27 MMOL/L (ref 22–29)
CREAT SERPL-MCNC: 0.83 MG/DL (ref 0.72–1.25)
CREAT/UREA NIT SERPL: 16
EOSINOPHIL # BLD AUTO: 0.43 X10(3)/MCL (ref 0–0.9)
EOSINOPHIL NFR BLD AUTO: 6.8 %
ERYTHROCYTE [DISTWIDTH] IN BLOOD BY AUTOMATED COUNT: 13.2 % (ref 11.5–17)
GFR SERPLBLD CREATININE-BSD FMLA CKD-EPI: >60 ML/MIN/1.73/M2
GLOBULIN SER-MCNC: 3.8 GM/DL (ref 2.4–3.5)
GLUCOSE SERPL-MCNC: 103 MG/DL (ref 74–100)
HCT VFR BLD AUTO: 41.5 % (ref 42–52)
HCV AB SERPL QL IA: NONREACTIVE
HDLC SERPL-MCNC: 36 MG/DL (ref 35–60)
HGB BLD-MCNC: 14.1 G/DL (ref 14–18)
HIV 1+2 AB+HIV1 P24 AG SERPL QL IA: NONREACTIVE
IMM GRANULOCYTES # BLD AUTO: 0.01 X10(3)/MCL (ref 0–0.04)
IMM GRANULOCYTES NFR BLD AUTO: 0.2 %
LDLC SERPL CALC-MCNC: 116 MG/DL (ref 50–140)
LYMPHOCYTES # BLD AUTO: 2.38 X10(3)/MCL (ref 0.6–4.6)
LYMPHOCYTES NFR BLD AUTO: 37.7 %
MCH RBC QN AUTO: 30.6 PG (ref 27–31)
MCHC RBC AUTO-ENTMCNC: 34 G/DL (ref 33–36)
MCV RBC AUTO: 90 FL (ref 80–94)
MONOCYTES # BLD AUTO: 0.51 X10(3)/MCL (ref 0.1–1.3)
MONOCYTES NFR BLD AUTO: 8.1 %
NEUTROPHILS # BLD AUTO: 2.89 X10(3)/MCL (ref 2.1–9.2)
NEUTROPHILS NFR BLD AUTO: 45.8 %
NRBC BLD AUTO-RTO: 0 %
PLATELET # BLD AUTO: 237 X10(3)/MCL (ref 130–400)
PMV BLD AUTO: 8.6 FL (ref 7.4–10.4)
POTASSIUM SERPL-SCNC: 4.5 MMOL/L (ref 3.5–5.1)
PROT SERPL-MCNC: 7.7 GM/DL (ref 6.4–8.3)
PSA SERPL-MCNC: 1.43 NG/ML
RBC # BLD AUTO: 4.61 X10(6)/MCL (ref 4.7–6.1)
SODIUM SERPL-SCNC: 145 MMOL/L (ref 136–145)
TRIGL SERPL-MCNC: 92 MG/DL (ref 34–140)
VLDLC SERPL CALC-MCNC: 18 MG/DL
WBC # BLD AUTO: 6.31 X10(3)/MCL (ref 4.5–11.5)

## 2024-11-11 PROCEDURE — 36415 COLL VENOUS BLD VENIPUNCTURE: CPT

## 2024-11-11 PROCEDURE — 87389 HIV-1 AG W/HIV-1&-2 AB AG IA: CPT

## 2024-11-11 PROCEDURE — 80061 LIPID PANEL: CPT

## 2024-11-11 PROCEDURE — 85025 COMPLETE CBC W/AUTO DIFF WBC: CPT

## 2024-11-11 PROCEDURE — 84153 ASSAY OF PSA TOTAL: CPT

## 2024-11-11 PROCEDURE — 80053 COMPREHEN METABOLIC PANEL: CPT

## 2024-11-11 PROCEDURE — 86803 HEPATITIS C AB TEST: CPT

## 2024-11-14 ENCOUNTER — OFFICE VISIT (OUTPATIENT)
Dept: FAMILY MEDICINE | Facility: CLINIC | Age: 59
End: 2024-11-14
Payer: COMMERCIAL

## 2024-11-14 VITALS
SYSTOLIC BLOOD PRESSURE: 122 MMHG | HEART RATE: 122 BPM | DIASTOLIC BLOOD PRESSURE: 78 MMHG | BODY MASS INDEX: 36.03 KG/M2 | RESPIRATION RATE: 18 BRPM | OXYGEN SATURATION: 98 % | HEIGHT: 72 IN | WEIGHT: 266 LBS | TEMPERATURE: 97 F

## 2024-11-14 DIAGNOSIS — Z00.00 WELLNESS EXAMINATION: Primary | ICD-10-CM

## 2024-11-14 DIAGNOSIS — F33.9 DEPRESSION, RECURRENT: ICD-10-CM

## 2024-11-14 PROCEDURE — 3008F BODY MASS INDEX DOCD: CPT | Mod: CPTII,,, | Performed by: FAMILY MEDICINE

## 2024-11-14 PROCEDURE — 3074F SYST BP LT 130 MM HG: CPT | Mod: CPTII,,, | Performed by: FAMILY MEDICINE

## 2024-11-14 PROCEDURE — 3078F DIAST BP <80 MM HG: CPT | Mod: CPTII,,, | Performed by: FAMILY MEDICINE

## 2024-11-14 PROCEDURE — 99396 PREV VISIT EST AGE 40-64: CPT | Mod: ,,, | Performed by: FAMILY MEDICINE

## 2024-11-14 PROCEDURE — 1160F RVW MEDS BY RX/DR IN RCRD: CPT | Mod: CPTII,,, | Performed by: FAMILY MEDICINE

## 2024-11-14 PROCEDURE — 4010F ACE/ARB THERAPY RXD/TAKEN: CPT | Mod: CPTII,,, | Performed by: FAMILY MEDICINE

## 2024-11-14 PROCEDURE — 3044F HG A1C LEVEL LT 7.0%: CPT | Mod: CPTII,,, | Performed by: FAMILY MEDICINE

## 2024-11-14 PROCEDURE — 1159F MED LIST DOCD IN RCRD: CPT | Mod: CPTII,,, | Performed by: FAMILY MEDICINE

## 2024-11-14 RX ORDER — IBUPROFEN 100 MG/5ML
1 SUSPENSION, ORAL (FINAL DOSE FORM) ORAL EVERY MORNING
COMMUNITY

## 2024-11-14 RX ORDER — ESCITALOPRAM OXALATE 10 MG/1
10 TABLET ORAL DAILY
Qty: 30 TABLET | Refills: 1 | Status: SHIPPED | OUTPATIENT
Start: 2024-11-14 | End: 2025-01-13

## 2024-11-14 NOTE — PROGRESS NOTES
"Subjective:      Patient ID: Lennox Epps is a 58 y.o. male.    Chief Complaint: Annual Exam      Wellness        Review of Systems   Constitutional:  Positive for fatigue (Reports that when he is depressed and makes him fatigued, recently seen by Dr. Reddy for sleep apnea and machine working well, patient using CPAP nightly).   Respiratory: Negative.     Cardiovascular: Negative.    Gastrointestinal: Negative.    Psychiatric/Behavioral: Negative.          Depression/anxiety: reports medication no longer working as well, unable to tolerate Celexa 20 mg         Objective:     /78   Pulse (!) 122   Temp 97.2 °F (36.2 °C) (Temporal)   Resp 18   Ht 5' 11.65" (1.82 m)   Wt 120.7 kg (266 lb)   SpO2 98%   BMI 36.43 kg/m²    Physical Exam  Constitutional:       Appearance: Normal appearance.   Cardiovascular:      Rate and Rhythm: Normal rate and regular rhythm.   Pulmonary:      Effort: Pulmonary effort is normal.      Breath sounds: Normal breath sounds.   Abdominal:      General: Abdomen is flat. Bowel sounds are normal.      Palpations: Abdomen is soft.   Neurological:      Mental Status: He is alert.   Psychiatric:         Mood and Affect: Mood normal.         Behavior: Behavior normal.         Thought Content: Thought content normal.         Judgment: Judgment normal.       Recent Results (from the past 3 weeks)   Protime-INR    Collection Time: 11/04/24  8:45 AM   Result Value Ref Range    PT 27.5 (H) 9.1 - 10.9 seconds    INR 2.8 (H) <=1.3   Comprehensive Metabolic Panel    Collection Time: 11/11/24  8:40 AM   Result Value Ref Range    Sodium 145 136 - 145 mmol/L    Potassium 4.5 3.5 - 5.1 mmol/L    Chloride 111 (H) 98 - 107 mmol/L    CO2 27 22 - 29 mmol/L    Glucose 103 (H) 74 - 100 mg/dL    Blood Urea Nitrogen 13.0 8.4 - 25.7 mg/dL    Creatinine 0.83 0.72 - 1.25 mg/dL    Calcium 9.6 8.4 - 10.2 mg/dL    Protein Total 7.7 6.4 - 8.3 gm/dL    Albumin 3.9 3.5 - 5.0 g/dL    Globulin 3.8 (H) 2.4 - 3.5 gm/dL "    Albumin/Globulin Ratio 1.0 (L) 1.1 - 2.0 ratio    Bilirubin Total 0.4 <=1.5 mg/dL    ALP 86 40 - 150 unit/L    ALT 19 0 - 55 unit/L    AST 19 5 - 34 unit/L    eGFR >60 mL/min/1.73/m2    Anion Gap 7.0 mEq/L    BUN/Creatinine Ratio 16    Hepatitis C Antibody    Collection Time: 11/11/24  8:40 AM   Result Value Ref Range    Hep C Ab Interp Nonreactive Nonreactive   HIV 1/2 Ag/Ab (4th Gen)    Collection Time: 11/11/24  8:40 AM   Result Value Ref Range    HIV Nonreactive Nonreactive   Lipid Panel    Collection Time: 11/11/24  8:40 AM   Result Value Ref Range    Cholesterol Total 170 <=200 mg/dL    HDL Cholesterol 36 35 - 60 mg/dL    Triglyceride 92 34 - 140 mg/dL    Cholesterol/HDL Ratio 5 0 - 5    Very Low Density Lipoprotein 18     LDL Cholesterol 116.00 50.00 - 140.00 mg/dL   PSA, Screening    Collection Time: 11/11/24  8:40 AM   Result Value Ref Range    Prostate Specific Antigen 1.43 <=4.00 ng/mL   CBC with Differential    Collection Time: 11/11/24  8:40 AM   Result Value Ref Range    WBC 6.31 4.50 - 11.50 x10(3)/mcL    RBC 4.61 (L) 4.70 - 6.10 x10(6)/mcL    Hgb 14.1 14.0 - 18.0 g/dL    Hct 41.5 (L) 42.0 - 52.0 %    MCV 90.0 80.0 - 94.0 fL    MCH 30.6 27.0 - 31.0 pg    MCHC 34.0 33.0 - 36.0 g/dL    RDW 13.2 11.5 - 17.0 %    Platelet 237 130 - 400 x10(3)/mcL    MPV 8.6 7.4 - 10.4 fL    Neut % 45.8 %    Lymph % 37.7 %    Mono % 8.1 %    Eos % 6.8 %    Basophil % 1.4 %    Lymph # 2.38 0.6 - 4.6 x10(3)/mcL    Neut # 2.89 2.1 - 9.2 x10(3)/mcL    Mono # 0.51 0.1 - 1.3 x10(3)/mcL    Eos # 0.43 0 - 0.9 x10(3)/mcL    Baso # 0.09 <=0.2 x10(3)/mcL    IG# 0.01 0 - 0.04 x10(3)/mcL    IG% 0.2 %    NRBC% 0.0 %            Assessment:     Problem List Items Addressed This Visit          Psychiatric    Depression, recurrent    Relevant Medications    EScitalopram oxalate (LEXAPRO) 10 MG tablet     Other Visit Diagnoses       Wellness examination    -  Primary             Plan:   1. Wellness examination  Lab work discussed with  patient  Continue current medication  Continue diet/exercise  Return to clinic with any concerns    2. Depression, recurrent  -     EScitalopram oxalate (LEXAPRO) 10 MG tablet; Take 1 tablet (10 mg total) by mouth once daily.  Dispense: 30 tablet; Refill: 1  Stop Celexa   Start Lexapro 10 mg q.day   Relaxation technique   Monitor  Return to clinic with any concerns

## 2024-11-15 ENCOUNTER — LAB VISIT (OUTPATIENT)
Dept: LAB | Facility: HOSPITAL | Age: 59
End: 2024-11-15
Attending: FAMILY MEDICINE
Payer: COMMERCIAL

## 2024-11-15 DIAGNOSIS — Z92.29 HX OF LONG TERM USE OF BLOOD THINNERS: ICD-10-CM

## 2024-11-15 DIAGNOSIS — D68.2 FACTOR V DEFICIENCY: ICD-10-CM

## 2024-11-15 LAB
INR PPP: 3.5
PROTHROMBIN TIME: 33.7 SECONDS (ref 9.1–10.9)

## 2024-11-15 PROCEDURE — 85610 PROTHROMBIN TIME: CPT

## 2024-11-15 PROCEDURE — 36415 COLL VENOUS BLD VENIPUNCTURE: CPT

## 2024-11-18 ENCOUNTER — LAB VISIT (OUTPATIENT)
Dept: LAB | Facility: HOSPITAL | Age: 59
End: 2024-11-18
Attending: FAMILY MEDICINE
Payer: COMMERCIAL

## 2024-11-18 DIAGNOSIS — I82.5Z1 CHRONIC DEEP VEIN THROMBOSIS (DVT) OF DISTAL VEIN OF RIGHT LOWER EXTREMITY: ICD-10-CM

## 2024-11-18 DIAGNOSIS — D68.2 FACTOR V DEFICIENCY: ICD-10-CM

## 2024-11-18 LAB
INR PPP: 1.5
PROTHROMBIN TIME: 14.9 SECONDS (ref 9.1–10.9)

## 2024-11-18 PROCEDURE — 85610 PROTHROMBIN TIME: CPT

## 2024-11-18 PROCEDURE — 36415 COLL VENOUS BLD VENIPUNCTURE: CPT

## 2024-11-22 ENCOUNTER — TELEPHONE (OUTPATIENT)
Dept: FAMILY MEDICINE | Facility: CLINIC | Age: 59
End: 2024-11-22
Payer: COMMERCIAL

## 2024-11-22 ENCOUNTER — LAB VISIT (OUTPATIENT)
Dept: LAB | Facility: HOSPITAL | Age: 59
End: 2024-11-22
Attending: FAMILY MEDICINE
Payer: COMMERCIAL

## 2024-11-22 DIAGNOSIS — I82.5Z1 CHRONIC DEEP VEIN THROMBOSIS (DVT) OF DISTAL VEIN OF RIGHT LOWER EXTREMITY: ICD-10-CM

## 2024-11-22 DIAGNOSIS — Z92.29 HX OF LONG TERM USE OF BLOOD THINNERS: ICD-10-CM

## 2024-11-22 DIAGNOSIS — D68.2 FACTOR V DEFICIENCY: Primary | ICD-10-CM

## 2024-11-22 DIAGNOSIS — D68.2 FACTOR V DEFICIENCY: ICD-10-CM

## 2024-11-22 LAB
INR PPP: 1.5
PROTHROMBIN TIME: 14.6 SECONDS (ref 9.1–10.9)

## 2024-11-22 PROCEDURE — 85610 PROTHROMBIN TIME: CPT

## 2024-11-22 PROCEDURE — 36415 COLL VENOUS BLD VENIPUNCTURE: CPT

## 2024-11-22 NOTE — TELEPHONE ENCOUNTER
----- Message from Aron Sullivan MD sent at 11/22/2024  8:21 AM CST -----  Please inform patient of results.     1. Increase Coumadin by 1 mg.  Repeat INR in 1 week

## 2024-11-25 DIAGNOSIS — F33.9 DEPRESSION, RECURRENT: ICD-10-CM

## 2024-11-25 RX ORDER — ESCITALOPRAM OXALATE 20 MG/1
20 TABLET ORAL DAILY
Qty: 90 TABLET | Refills: 1 | Status: SHIPPED | OUTPATIENT
Start: 2024-11-25

## 2024-11-29 ENCOUNTER — LAB VISIT (OUTPATIENT)
Dept: LAB | Facility: HOSPITAL | Age: 59
End: 2024-11-29
Attending: FAMILY MEDICINE
Payer: COMMERCIAL

## 2024-11-29 DIAGNOSIS — Z92.29 HX OF LONG TERM USE OF BLOOD THINNERS: ICD-10-CM

## 2024-11-29 DIAGNOSIS — D68.2 FACTOR V DEFICIENCY: ICD-10-CM

## 2024-11-29 LAB
INR PPP: 2.8
PROTHROMBIN TIME: 27.2 SECONDS (ref 9.1–10.9)

## 2024-11-29 PROCEDURE — 85610 PROTHROMBIN TIME: CPT

## 2024-11-29 PROCEDURE — 36415 COLL VENOUS BLD VENIPUNCTURE: CPT

## 2024-12-03 ENCOUNTER — TELEPHONE (OUTPATIENT)
Dept: FAMILY MEDICINE | Facility: CLINIC | Age: 59
End: 2024-12-03
Payer: COMMERCIAL

## 2024-12-03 NOTE — TELEPHONE ENCOUNTER
----- Message from Goldie Hernandez NP sent at 12/2/2024  5:07 PM CST -----  Please inform patient of results.     1. INR therapeutic.  Continue current Coumadin dose.  Repeat INR in 2 weeks

## 2024-12-16 ENCOUNTER — LAB VISIT (OUTPATIENT)
Dept: LAB | Facility: HOSPITAL | Age: 59
End: 2024-12-16
Attending: FAMILY MEDICINE
Payer: COMMERCIAL

## 2024-12-16 ENCOUNTER — TELEPHONE (OUTPATIENT)
Dept: FAMILY MEDICINE | Facility: CLINIC | Age: 59
End: 2024-12-16

## 2024-12-16 ENCOUNTER — OFFICE VISIT (OUTPATIENT)
Dept: FAMILY MEDICINE | Facility: CLINIC | Age: 59
End: 2024-12-16
Payer: COMMERCIAL

## 2024-12-16 VITALS
HEART RATE: 108 BPM | OXYGEN SATURATION: 97 % | HEIGHT: 72 IN | SYSTOLIC BLOOD PRESSURE: 120 MMHG | DIASTOLIC BLOOD PRESSURE: 72 MMHG | BODY MASS INDEX: 36.03 KG/M2 | TEMPERATURE: 97 F | RESPIRATION RATE: 18 BRPM | WEIGHT: 266 LBS

## 2024-12-16 DIAGNOSIS — Z92.29 HX OF LONG TERM USE OF BLOOD THINNERS: ICD-10-CM

## 2024-12-16 DIAGNOSIS — D68.2 FACTOR V DEFICIENCY: Primary | ICD-10-CM

## 2024-12-16 DIAGNOSIS — F33.9 DEPRESSION, RECURRENT: Primary | ICD-10-CM

## 2024-12-16 LAB
INR PPP: 3.6
PROTHROMBIN TIME: 34.7 SECONDS (ref 9.1–10.9)

## 2024-12-16 PROCEDURE — 3008F BODY MASS INDEX DOCD: CPT | Mod: CPTII,,, | Performed by: FAMILY MEDICINE

## 2024-12-16 PROCEDURE — 36415 COLL VENOUS BLD VENIPUNCTURE: CPT

## 2024-12-16 PROCEDURE — 3044F HG A1C LEVEL LT 7.0%: CPT | Mod: CPTII,,, | Performed by: FAMILY MEDICINE

## 2024-12-16 PROCEDURE — 3074F SYST BP LT 130 MM HG: CPT | Mod: CPTII,,, | Performed by: FAMILY MEDICINE

## 2024-12-16 PROCEDURE — 85610 PROTHROMBIN TIME: CPT

## 2024-12-16 PROCEDURE — 3078F DIAST BP <80 MM HG: CPT | Mod: CPTII,,, | Performed by: FAMILY MEDICINE

## 2024-12-16 PROCEDURE — 4010F ACE/ARB THERAPY RXD/TAKEN: CPT | Mod: CPTII,,, | Performed by: FAMILY MEDICINE

## 2024-12-16 PROCEDURE — 1159F MED LIST DOCD IN RCRD: CPT | Mod: CPTII,,, | Performed by: FAMILY MEDICINE

## 2024-12-16 PROCEDURE — 1160F RVW MEDS BY RX/DR IN RCRD: CPT | Mod: CPTII,,, | Performed by: FAMILY MEDICINE

## 2024-12-16 PROCEDURE — 99213 OFFICE O/P EST LOW 20 MIN: CPT | Mod: ,,, | Performed by: FAMILY MEDICINE

## 2024-12-16 RX ORDER — FUROSEMIDE 20 MG/1
20 TABLET ORAL DAILY PRN
COMMUNITY
Start: 2024-12-04

## 2024-12-16 RX ORDER — POTASSIUM CHLORIDE 750 MG/1
10 CAPSULE, EXTENDED RELEASE ORAL DAILY PRN
COMMUNITY
Start: 2024-12-04

## 2024-12-16 NOTE — PROGRESS NOTES
"Subjective:      Patient ID: Lennox Epps is a 59 y.o. male.    Chief Complaint: 1 month f/u      Follow-up        Review of Systems   Constitutional: Negative.    Respiratory: Negative.     Cardiovascular: Negative.    Gastrointestinal: Negative.    Psychiatric/Behavioral: Negative.          Depression: improving, reports drowsiness with medication; otherwise, patient without any complaints         Objective:     /72   Pulse 108   Temp 97.1 °F (36.2 °C) (Temporal)   Resp 18   Ht 5' 11.65" (1.82 m)   Wt 120.7 kg (266 lb)   SpO2 97%   BMI 36.43 kg/m²    Physical Exam  Constitutional:       Appearance: Normal appearance.   Cardiovascular:      Rate and Rhythm: Normal rate and regular rhythm.      Heart sounds: Normal heart sounds.   Pulmonary:      Effort: Pulmonary effort is normal.      Breath sounds: Normal breath sounds.   Neurological:      Mental Status: He is alert.   Psychiatric:         Mood and Affect: Mood normal.         Behavior: Behavior normal.         Thought Content: Thought content normal.         Judgment: Judgment normal.             Assessment:     Problem List Items Addressed This Visit          Psychiatric    Depression, recurrent - Primary        Plan:   1. Depression, recurrent  Improving with medication  Discussed moving medication to qpm  Monitor  Return to clinic with any concerns    "

## 2024-12-16 NOTE — TELEPHONE ENCOUNTER
----- Message from Aron Sullivan MD sent at 12/16/2024 11:45 AM CST -----  Please inform patient of results.     1. Decrease Coumadin to 5 mg q.day.  Repeat INR in 1 week

## 2024-12-26 ENCOUNTER — LAB VISIT (OUTPATIENT)
Dept: LAB | Facility: HOSPITAL | Age: 59
End: 2024-12-26
Attending: FAMILY MEDICINE
Payer: COMMERCIAL

## 2024-12-26 DIAGNOSIS — Z92.29 HX OF LONG TERM USE OF BLOOD THINNERS: ICD-10-CM

## 2024-12-26 DIAGNOSIS — D68.2 FACTOR V DEFICIENCY: ICD-10-CM

## 2024-12-26 LAB
INR PPP: 2.6
PROTHROMBIN TIME: 25.4 SECONDS (ref 9.1–10.9)

## 2024-12-26 PROCEDURE — 36415 COLL VENOUS BLD VENIPUNCTURE: CPT

## 2024-12-26 PROCEDURE — 85610 PROTHROMBIN TIME: CPT

## 2024-12-27 ENCOUNTER — TELEPHONE (OUTPATIENT)
Dept: FAMILY MEDICINE | Facility: CLINIC | Age: 59
End: 2024-12-27
Payer: COMMERCIAL

## 2024-12-27 NOTE — TELEPHONE ENCOUNTER
----- Message from Neptali Szymanski DO sent at 12/26/2024  3:46 PM CST -----  Please inform patient of lab results.    1. What's his target INR range?       Thanks,    Dr. Szymanski

## 2025-01-13 ENCOUNTER — TELEPHONE (OUTPATIENT)
Dept: FAMILY MEDICINE | Facility: CLINIC | Age: 60
End: 2025-01-13
Payer: COMMERCIAL

## 2025-01-13 ENCOUNTER — LAB VISIT (OUTPATIENT)
Dept: LAB | Facility: HOSPITAL | Age: 60
End: 2025-01-13
Attending: FAMILY MEDICINE
Payer: COMMERCIAL

## 2025-01-13 DIAGNOSIS — Z92.29 HX OF LONG TERM USE OF BLOOD THINNERS: ICD-10-CM

## 2025-01-13 DIAGNOSIS — Z92.29 HX OF LONG TERM USE OF BLOOD THINNERS: Primary | ICD-10-CM

## 2025-01-13 LAB
INR PPP: 1.8
PROTHROMBIN TIME: 17.9 SECONDS (ref 9.1–10.9)

## 2025-01-13 PROCEDURE — 85610 PROTHROMBIN TIME: CPT

## 2025-01-13 PROCEDURE — 36415 COLL VENOUS BLD VENIPUNCTURE: CPT

## 2025-01-13 NOTE — TELEPHONE ENCOUNTER
----- Message from Aron Sullivan MD sent at 1/13/2025 12:57 PM CST -----  Please inform patient of results.     1. Increase Coumadin by 1 mg.  Repeat INR in 1 week

## 2025-01-23 DIAGNOSIS — I10 HYPERTENSION, UNSPECIFIED TYPE: Primary | ICD-10-CM

## 2025-01-24 RX ORDER — LOSARTAN POTASSIUM 100 MG/1
100 TABLET ORAL
Qty: 90 TABLET | Refills: 1 | Status: SHIPPED | OUTPATIENT
Start: 2025-01-24

## 2025-01-27 ENCOUNTER — LAB VISIT (OUTPATIENT)
Dept: LAB | Facility: HOSPITAL | Age: 60
End: 2025-01-27
Attending: FAMILY MEDICINE
Payer: COMMERCIAL

## 2025-01-27 ENCOUNTER — TELEPHONE (OUTPATIENT)
Dept: FAMILY MEDICINE | Facility: CLINIC | Age: 60
End: 2025-01-27
Payer: COMMERCIAL

## 2025-01-27 DIAGNOSIS — Z92.29 HX OF LONG TERM USE OF BLOOD THINNERS: ICD-10-CM

## 2025-01-27 DIAGNOSIS — Z92.29 HX OF LONG TERM USE OF BLOOD THINNERS: Primary | ICD-10-CM

## 2025-01-27 LAB
INR PPP: 2.3
PROTHROMBIN TIME: 22.2 SECONDS (ref 9.1–10.9)

## 2025-01-27 PROCEDURE — 85610 PROTHROMBIN TIME: CPT

## 2025-01-27 PROCEDURE — 36415 COLL VENOUS BLD VENIPUNCTURE: CPT

## 2025-01-27 NOTE — TELEPHONE ENCOUNTER
----- Message from Aron Sullivan MD sent at 1/27/2025  8:10 AM CST -----  Please inform patient of results.     1. INR therapeutic.  Continue current Coumadin dose.  Repeat INR in 2 weeks

## 2025-01-27 NOTE — TELEPHONE ENCOUNTER
Notified pt. INR ordered   Please see completed thoracic surgery consult by Jaya Knox MD and cosigned by Prisca Cleaning MD on 2/12/2021 at 3:58pm     Emani Hines MD

## 2025-02-13 ENCOUNTER — LAB VISIT (OUTPATIENT)
Dept: LAB | Facility: HOSPITAL | Age: 60
End: 2025-02-13
Attending: FAMILY MEDICINE
Payer: COMMERCIAL

## 2025-02-13 ENCOUNTER — OFFICE VISIT (OUTPATIENT)
Dept: FAMILY MEDICINE | Facility: CLINIC | Age: 60
End: 2025-02-13
Payer: COMMERCIAL

## 2025-02-13 VITALS
DIASTOLIC BLOOD PRESSURE: 72 MMHG | RESPIRATION RATE: 18 BRPM | WEIGHT: 262 LBS | SYSTOLIC BLOOD PRESSURE: 108 MMHG | HEIGHT: 72 IN | BODY MASS INDEX: 35.49 KG/M2 | OXYGEN SATURATION: 96 % | TEMPERATURE: 97 F | HEART RATE: 110 BPM

## 2025-02-13 DIAGNOSIS — Z92.29 HX OF LONG TERM USE OF BLOOD THINNERS: ICD-10-CM

## 2025-02-13 DIAGNOSIS — I82.5Z1 CHRONIC DEEP VEIN THROMBOSIS (DVT) OF DISTAL VEIN OF RIGHT LOWER EXTREMITY: ICD-10-CM

## 2025-02-13 DIAGNOSIS — D68.2 FACTOR V DEFICIENCY: ICD-10-CM

## 2025-02-13 DIAGNOSIS — F41.9 ANXIETY: Primary | ICD-10-CM

## 2025-02-13 DIAGNOSIS — E66.01 SEVERE OBESITY (BMI 35.0-39.9) WITH COMORBIDITY: ICD-10-CM

## 2025-02-13 LAB
INR PPP: 1.5
PROTHROMBIN TIME: 15 SECONDS (ref 9.1–10.9)

## 2025-02-13 PROCEDURE — 36415 COLL VENOUS BLD VENIPUNCTURE: CPT

## 2025-02-13 PROCEDURE — 99214 OFFICE O/P EST MOD 30 MIN: CPT | Mod: ,,,

## 2025-02-13 PROCEDURE — 1160F RVW MEDS BY RX/DR IN RCRD: CPT | Mod: CPTII,,,

## 2025-02-13 PROCEDURE — 3074F SYST BP LT 130 MM HG: CPT | Mod: CPTII,,,

## 2025-02-13 PROCEDURE — 4010F ACE/ARB THERAPY RXD/TAKEN: CPT | Mod: CPTII,,,

## 2025-02-13 PROCEDURE — 1159F MED LIST DOCD IN RCRD: CPT | Mod: CPTII,,,

## 2025-02-13 PROCEDURE — 3078F DIAST BP <80 MM HG: CPT | Mod: CPTII,,,

## 2025-02-13 PROCEDURE — 3008F BODY MASS INDEX DOCD: CPT | Mod: CPTII,,,

## 2025-02-13 PROCEDURE — 85610 PROTHROMBIN TIME: CPT

## 2025-02-13 RX ORDER — SERTRALINE HYDROCHLORIDE 50 MG/1
50 TABLET, FILM COATED ORAL DAILY
Qty: 30 TABLET | Refills: 1 | Status: SHIPPED | OUTPATIENT
Start: 2025-02-13 | End: 2025-04-14

## 2025-02-13 RX ORDER — ALPRAZOLAM 0.25 MG/1
0.25 TABLET ORAL 3 TIMES DAILY PRN
Qty: 90 TABLET | Refills: 0 | Status: SHIPPED | OUTPATIENT
Start: 2025-02-13 | End: 2025-03-15

## 2025-02-13 RX ORDER — WARFARIN 1 MG/1
2 TABLET ORAL DAILY
Start: 2025-02-13

## 2025-02-13 NOTE — ASSESSMENT & PLAN NOTE
Lab reviewed; INR subtherapeutic at 1.5  Increase Coumadin to 7 mg by mouth daily  Repeat INR in 1 week  Discussed transitioning from Coumadin to Eliquis; patient defers at this time but will call insurance to assess affordability of Eliquis and will let us know if he would like to switch.  Followed by cardiology (Dr. Lee); keep scheduled follow-ups  Not currently followed by Hematology; previously seen by Dr. Rice  Bleeding precautions  Fall precautions  Monitor  Return to clinic with any concerns

## 2025-02-13 NOTE — PROGRESS NOTES
Family Medicine      Patient ID: [id number]     Chief Complaint: Anxiety      HPI:     Lennox Epps is a 59 y.o. male here today for panic attack, worsening anxiety, and nightmares associated with Lexapro over the past month.    Mr. Epps reports anxiety is his primary concern rather than depression, and he describes his current state of anxiety as severe; feels as though he is having panic attack at this time. Reports nightmares after switching Lexapro dosage from morning to night, a change initially made to address daytime drowsiness. He feels drowsy during the day. He reports shortness of breath, which he attributes to his current state of anxiety.. He has tried Vistaril (hydroxyzine) for anxiety in the past but found it ineffective. He has been on Lexapro for about a month, having switched from Celexa due to lack of efficacy. His anxiety while driving is particularly severe.    He denies feeling depressed, SI/HI, having any chest pain, palpitations, chest tightness, orthopnea, MI, or worsening bilateral LE swelling.    MEDICATIONS:  Mr. Epps is on Lexapro for anxiety/depression, taken at night due to daytime drowsiness, but causing nightmares. He is also on Abilify. 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 Mr. Epps switched from Celexa to Lexapro about a month ago due to ineffectiveness. Vistaril (hydroxyzine) was prescribed as needed for anxiety, but the patient has not been taking it as he forgot about it.    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    TEST RESULTS:  Mr. Epps's recent INR was subtherapeutic at 1.5. In the past, his INR was within the therapeutic range when on a 7.5 mg Coumadin dose.    Past Medical History:   Diagnosis Date    Anxiety 4/4/2023    DVT (deep venous thrombosis)     Factor 5 Leiden mutation, heterozygous     GERD (gastroesophageal reflux disease)      Hypertension         Past Surgical History:   Procedure Laterality Date    APPENDECTOMY      BACK SURGERY      CATARACT EXTRACTION Bilateral 2022    COLONOSCOPY      EYE SURGERY  2 years ago    Cateract on both and detached retina    RETINAL DETACHMENT SURGERY  2022    SPINE SURGERY  02/05/1984    3 lumber surgeries over 30 years ago    TONSILLECTOMY      TOTAL KNEE ARTHROPLASTY Right 09/10/2024    Dr Juan David Hdz        Review of patient's allergies indicates:  No Known Allergies     Patient Care Team:  Aron Sullivan MD as PCP - General (Family Medicine)  José George MD as Consulting Physician (Gastroenterology)  Angelo Reddy MD as Consulting Physician (Otolaryngology)  José Lee MD as Consulting Physician (Cardiology)  Corwin Tran Jr., MD as Consulting Physician (Physical Medicine and Rehabilitation)  COSME Hdz MD (Orthopedic Surgery)  Lakia Arrington LPN as Licensed Practical Nurse     Subjective:     Review of Systems  General: negative fever, negative chills, +fatigue, negative weight gain, negative weight loss  Eyes: negative vision changes, negative redness, negative discharge  ENT: negative ear pain, negative nasal congestion, negative sore throat  Cardiovascular: negative chest pain, negative palpitations, negative lower extremity edema  Respiratory: negative cough, +shortness of breath  Gastrointestinal: negative abdominal pain, negative nausea, negative vomiting, negative diarrhea, negative constipation, negative blood in stool  Genitourinary: negative dysuria, negative hematuria, negative frequency  Musculoskeletal: negative joint pain, negative muscle pain  Skin: negative rash, negative lesion  Neurological: negative headache, negative dizziness, negative numbness, negative tingling  Psychiatric: +anxiety, negative depression, +sleep difficulty, +nightmares     Objective:     Visit Vitals  /72   Pulse 110   Temp 97.1 °F (36.2 °C) (Temporal)   Resp 18   Ht 5'  "11.65" (1.82 m)   Wt 118.8 kg (262 lb)   SpO2 96%   BMI 35.88 kg/m²     Physical Exam  Constitutional:       General: He is not in acute distress.     Appearance: Normal appearance. He is not ill-appearing.   HENT:      Head: Normocephalic and atraumatic.   Eyes:      General: No scleral icterus.     Pupils: Pupils are equal, round, and reactive to light.   Cardiovascular:      Rate and Rhythm: Regular rhythm. Tachycardia present.      Heart sounds: Normal heart sounds.   Pulmonary:      Effort: Pulmonary effort is normal. No respiratory distress.      Breath sounds: Normal breath sounds. No wheezing, rhonchi or rales.   Abdominal:      General: Bowel sounds are normal. There is no distension.      Palpations: Abdomen is soft.      Tenderness: There is no abdominal tenderness. There is no guarding.   Musculoskeletal:      Cervical back: Neck supple.      Right lower le+ Edema present.      Left lower le+ Edema present.   Neurological:      General: No focal deficit present.      Mental Status: He is alert and oriented to person, place, and time. Mental status is at baseline.   Psychiatric:         Mood and Affect: Mood is anxious. Mood is not depressed.         Speech: Speech normal.         Behavior: Behavior is hyperactive. Behavior is cooperative.         Thought Content: Thought content normal. Thought content does not include homicidal or suicidal ideation.         Judgment: Judgment normal.       Labs Reviewed:     Protime-INR    Collection Time: 25  7:41 AM   Result Value Ref Range    PT 15.0 (H) 9.1 - 10.9 seconds    INR 1.5 (H) <=1.3      Assessment:         ICD-10-CM ICD-9-CM   1. Anxiety  F41.9 300.00   2. 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 Chronic deep vein thrombosis (DVT) of distal vein of right lower extremity  I82.5Z1 453.52   4. Severe obesity (BMI 35.0-39.9) with comorbidity  E66.01 278.01        Plan:     1. Anxiety  Assessment & Plan:  Uncontrolled  Stop Lexapro s/t " nightmares  Start Zoloft 50 mg by mouth daily  Start Xanax 0.25 mg by mouth t.i.d. p.r.n. anxiety ( reviewed)  Reviewed side effects and importance of compliance with medication  Discussed coping skills to help reduce anxiety  Exercise daily  Avoid caffeine, alcohol and stimulants  Encouraged getting 7-8 hours of uninterrupted sleep per night  Discussed benefits of medication not becoming noticeable until up to 6 weeks from start date  Report any symptoms of suicidal or homicidal ideations immediately, if clinic is closed go to nearest emergency room   Return to clinic in 1 month for follow-up or sooner with any concerns.    Orders:  -     sertraline (ZOLOFT) 50 MG tablet; Take 1 tablet (50 mg total) by mouth once daily.  Dispense: 30 tablet; Refill: 1  -     ALPRAZolam (XANAX) 0.25 MG tablet; Take 1 tablet (0.25 mg total) by mouth 3 (three) times daily as needed for Anxiety.  Dispense: 90 tablet; Refill: 0  -     Ambulatory referral/consult to Psychiatry; Future; Expected date: 02/20/2025    2. Factor V deficiency  Assessment & Plan:  Lab reviewed; INR subtherapeutic at 1.5  Increase Coumadin to 7 mg by mouth daily  Repeat INR in 1 week  Discussed transitioning from Coumadin to Eliquis; patient defers at this time but will call insurance to assess affordability of Eliquis and will let us know if he would like to switch.  Followed by cardiology (Dr. Lee); keep scheduled follow-ups  Not currently followed by Hematology; previously seen by Dr. Rice  Bleeding precautions  Fall precautions  Monitor  Return to clinic with any concerns    Orders:  -     Protime-INR; Future; Expected date: 02/20/2025  -     warfarin (COUMADIN) 1 MG tablet; Take 2 tablets (2 mg total) by mouth Daily.    3. Chronic deep vein thrombosis (DVT) of distal vein of right lower extremity  Assessment & Plan:  Lab reviewed; INR subtherapeutic at 1.5  Increase Coumadin to 7 mg by mouth daily  Repeat INR in 1 week  Discussed transitioning from  Coumadin to Eliquis; patient defers at this time but will call insurance to assess affordability of Eliquis and will let us know if he would like to switch.  Followed by cardiology (Dr. Lee); keep scheduled follow-ups  Not currently followed by Hematology; previously seen by Dr. Rice  Bleeding precautions  Fall precautions  Monitor  Return to clinic with any concerns    Orders:  -     Protime-INR; Future; Expected date: 02/20/2025  -     warfarin (COUMADIN) 1 MG tablet; Take 2 tablets (2 mg total) by mouth Daily.    4. Severe obesity (BMI 35.0-39.9) with comorbidity  Assessment & Plan:  Reduced calorie diet modification  Frequent self weighing   Exercise/lifestyle modification with a minimum of 150 minutes of exercise per week            Follow up in about 1 month (around 3/13/2025) for Follow Up, anxiety. In addition to their scheduled follow up, the patient has also been instructed to follow up on as needed basis.     This note was generated with the assistance of ambient listening technology. Verbal consent was obtained by the patient and accompanying visitor(s) for the recording of patient appointment to facilitate this note. I attest to having reviewed and edited the generated note for accuracy, though some syntax or spelling errors may persist. Please contact the author of this note for any clarification.       Goldie Hernandez NP

## 2025-02-13 NOTE — ASSESSMENT & PLAN NOTE
Reduced calorie diet modification  Frequent self weighing   Exercise/lifestyle modification with a minimum of 150 minutes of exercise per week

## 2025-02-13 NOTE — ASSESSMENT & PLAN NOTE
Uncontrolled  Stop Lexapro s/t nightmares  Start Zoloft 50 mg by mouth daily  Start Xanax 0.25 mg by mouth t.i.d. p.r.n. anxiety ( reviewed)  Reviewed side effects and importance of compliance with medication  Discussed coping skills to help reduce anxiety  Exercise daily  Avoid caffeine, alcohol and stimulants  Encouraged getting 7-8 hours of uninterrupted sleep per night  Discussed benefits of medication not becoming noticeable until up to 6 weeks from start date  Report any symptoms of suicidal or homicidal ideations immediately, if clinic is closed go to nearest emergency room   Return to clinic in 1 month for follow-up or sooner with any concerns.

## 2025-02-24 ENCOUNTER — LAB VISIT (OUTPATIENT)
Dept: LAB | Facility: HOSPITAL | Age: 60
End: 2025-02-24
Payer: COMMERCIAL

## 2025-02-24 ENCOUNTER — RESULTS FOLLOW-UP (OUTPATIENT)
Dept: FAMILY MEDICINE | Facility: CLINIC | Age: 60
End: 2025-02-24
Payer: COMMERCIAL

## 2025-02-24 DIAGNOSIS — I82.5Z1 CHRONIC DEEP VEIN THROMBOSIS (DVT) OF DISTAL VEIN OF RIGHT LOWER EXTREMITY: Primary | ICD-10-CM

## 2025-02-24 DIAGNOSIS — D68.2 FACTOR V DEFICIENCY: ICD-10-CM

## 2025-02-24 DIAGNOSIS — I82.5Z1 CHRONIC DEEP VEIN THROMBOSIS (DVT) OF DISTAL VEIN OF RIGHT LOWER EXTREMITY: ICD-10-CM

## 2025-02-24 LAB
INR PPP: 2.9
PROTHROMBIN TIME: 27.7 SECONDS (ref 9.3–11.4)

## 2025-02-24 PROCEDURE — 36415 COLL VENOUS BLD VENIPUNCTURE: CPT

## 2025-02-24 PROCEDURE — 85610 PROTHROMBIN TIME: CPT

## 2025-02-24 NOTE — TELEPHONE ENCOUNTER
----- Message from Goldie Hernandez NP sent at 2/24/2025 10:15 AM CST -----  Please inform patient of results.    1. INR therapeutic; continue Coumadin 7 mg by mouth daily.  Repeat INR in 2 weeks.  ----- Message -----  From: Lab, Background User  Sent: 2/24/2025   9:19 AM CST  To: Goldie Hernandez NP

## 2025-02-24 NOTE — PROGRESS NOTES
Patient called and would like to try transition from Coumadin to Eliquis if Eliquis is affordable with patient's current insurance policy.  Discussed this during last office visit on 02/13/2025.  Only way the patient will know whether or not Eliquis is covered is if a prescription is sent to the pharmacy.  We will send prescription for Eliquis 5 mg by mouth b.i.d.; patient advised to not begin Eliquis before speaking with PCP office to allow for proper anticoagulation bridging.    For now continue Coumadin 7 mg by mouth daily until advised with further instructions by PCP office.

## 2025-02-24 NOTE — PROGRESS NOTES
Please inform patient of results.    1. INR therapeutic; continue Coumadin 7 mg by mouth daily.  Repeat INR in 2 weeks.

## 2025-02-24 NOTE — PROGRESS NOTES
Patient able to feel Eliquis prescription.  We will transition from Coumadin to Eliquis.  Advised to hold Coumadin dose today.  Repeat INR tomorrow afternoon.  We will start Eliquis once INR < 2.

## 2025-02-25 ENCOUNTER — LAB VISIT (OUTPATIENT)
Dept: LAB | Facility: HOSPITAL | Age: 60
End: 2025-02-25
Payer: COMMERCIAL

## 2025-02-25 DIAGNOSIS — D68.2 FACTOR V DEFICIENCY: ICD-10-CM

## 2025-02-25 DIAGNOSIS — I82.5Z1 CHRONIC DEEP VEIN THROMBOSIS (DVT) OF DISTAL VEIN OF RIGHT LOWER EXTREMITY: ICD-10-CM

## 2025-02-25 LAB
INR PPP: 2.1
PROTHROMBIN TIME: 20.3 SECONDS (ref 9.3–11.4)

## 2025-02-25 PROCEDURE — 85610 PROTHROMBIN TIME: CPT

## 2025-02-25 PROCEDURE — 36415 COLL VENOUS BLD VENIPUNCTURE: CPT

## 2025-02-26 ENCOUNTER — LAB VISIT (OUTPATIENT)
Dept: LAB | Facility: HOSPITAL | Age: 60
End: 2025-02-26
Payer: COMMERCIAL

## 2025-02-26 ENCOUNTER — RESULTS FOLLOW-UP (OUTPATIENT)
Dept: FAMILY MEDICINE | Facility: CLINIC | Age: 60
End: 2025-02-26
Payer: COMMERCIAL

## 2025-02-26 DIAGNOSIS — D68.2 FACTOR V DEFICIENCY: ICD-10-CM

## 2025-02-26 DIAGNOSIS — I82.5Z1 CHRONIC DEEP VEIN THROMBOSIS (DVT) OF DISTAL VEIN OF RIGHT LOWER EXTREMITY: ICD-10-CM

## 2025-02-26 DIAGNOSIS — I82.5Z1 CHRONIC DEEP VEIN THROMBOSIS (DVT) OF DISTAL VEIN OF RIGHT LOWER EXTREMITY: Primary | ICD-10-CM

## 2025-02-26 LAB
INR PPP: 1.6
PROTHROMBIN TIME: 16.1 SECONDS (ref 9.3–11.4)

## 2025-02-26 PROCEDURE — 36415 COLL VENOUS BLD VENIPUNCTURE: CPT

## 2025-02-26 PROCEDURE — 85610 PROTHROMBIN TIME: CPT

## 2025-02-26 NOTE — PROGRESS NOTES
Please inform patient of results.    1. INR 2.1; repeat lab today.  If INR < 2.0 we will start Eliquis 5 mg b.i.d.

## 2025-02-26 NOTE — TELEPHONE ENCOUNTER
----- Message from Goldie Hernandez NP sent at 2/26/2025  8:56 AM CST -----  Please inform patient of results.    1. INR 2.1; repeat lab today.  If INR < 2.0 we will start Eliquis 5 mg b.i.d.  ----- Message -----  From: Lab, Background User  Sent: 2/25/2025   4:46 PM CST  To: Goldie Hernandez NP

## 2025-02-27 ENCOUNTER — TELEPHONE (OUTPATIENT)
Dept: FAMILY MEDICINE | Facility: CLINIC | Age: 60
End: 2025-02-27
Payer: COMMERCIAL

## 2025-02-27 ENCOUNTER — RESULTS FOLLOW-UP (OUTPATIENT)
Dept: FAMILY MEDICINE | Facility: CLINIC | Age: 60
End: 2025-02-27
Payer: COMMERCIAL

## 2025-02-27 NOTE — TELEPHONE ENCOUNTER
----- Message from Goldie Hernandez NP sent at 2/27/2025  8:14 AM CST -----  Please inform patient of results.    1. INR 1.6.  Start Eliquis this morning; take 5 mg b.i.d. call with any concerns.  No need for repeat INR.  ----- Message -----  From: Lab, Background User  Sent: 2/26/2025   4:27 PM CST  To: Goldie Hernandez NP

## 2025-02-27 NOTE — TELEPHONE ENCOUNTER
----- Message from Ana sent at 2/27/2025  8:53 AM CST -----  Regarding: nurse call  Wife called- Gladis  [phone] to know if he is still actively on this medCitalopram

## 2025-02-27 NOTE — TELEPHONE ENCOUNTER
His wife wanted to see if he was still on the Citalopram. It was discontinued by DR. Sullivan on 11/14/2024.

## 2025-02-27 NOTE — PROGRESS NOTES
Please inform patient of results.    1. INR 1.6.  Start Eliquis this morning; take 5 mg b.i.d. call with any concerns.  No need for repeat INR.

## 2025-02-28 DIAGNOSIS — F33.9 DEPRESSION, RECURRENT: ICD-10-CM

## 2025-02-28 RX ORDER — ARIPIPRAZOLE 15 MG/1
15 TABLET ORAL
Qty: 90 TABLET | Refills: 1 | Status: SHIPPED | OUTPATIENT
Start: 2025-02-28

## 2025-03-08 DIAGNOSIS — K21.9 GASTROESOPHAGEAL REFLUX DISEASE WITHOUT ESOPHAGITIS: ICD-10-CM

## 2025-03-08 DIAGNOSIS — I10 HYPERTENSION, UNSPECIFIED TYPE: ICD-10-CM

## 2025-03-10 DIAGNOSIS — F41.9 ANXIETY: ICD-10-CM

## 2025-03-10 RX ORDER — AMLODIPINE BESYLATE 10 MG/1
10 TABLET ORAL EVERY MORNING
Qty: 90 TABLET | Refills: 1 | Status: SHIPPED | OUTPATIENT
Start: 2025-03-10 | End: 2025-07-08

## 2025-03-10 RX ORDER — PANTOPRAZOLE SODIUM 40 MG/1
40 TABLET, DELAYED RELEASE ORAL
Qty: 90 TABLET | Refills: 1 | Status: SHIPPED | OUTPATIENT
Start: 2025-03-10

## 2025-03-10 RX ORDER — SERTRALINE HYDROCHLORIDE 50 MG/1
50 TABLET, FILM COATED ORAL DAILY
Qty: 90 TABLET | Refills: 1 | Status: SHIPPED | OUTPATIENT
Start: 2025-03-10 | End: 2025-03-13 | Stop reason: SDUPTHER

## 2025-03-13 ENCOUNTER — OFFICE VISIT (OUTPATIENT)
Dept: FAMILY MEDICINE | Facility: CLINIC | Age: 60
End: 2025-03-13
Payer: COMMERCIAL

## 2025-03-13 VITALS
HEIGHT: 71 IN | DIASTOLIC BLOOD PRESSURE: 70 MMHG | TEMPERATURE: 98 F | HEART RATE: 84 BPM | OXYGEN SATURATION: 95 % | BODY MASS INDEX: 37.38 KG/M2 | WEIGHT: 267 LBS | RESPIRATION RATE: 18 BRPM | SYSTOLIC BLOOD PRESSURE: 118 MMHG

## 2025-03-13 DIAGNOSIS — F41.9 ANXIETY: Primary | ICD-10-CM

## 2025-03-13 PROCEDURE — 3074F SYST BP LT 130 MM HG: CPT | Mod: CPTII,,,

## 2025-03-13 PROCEDURE — 1160F RVW MEDS BY RX/DR IN RCRD: CPT | Mod: CPTII,,,

## 2025-03-13 PROCEDURE — 4010F ACE/ARB THERAPY RXD/TAKEN: CPT | Mod: CPTII,,,

## 2025-03-13 PROCEDURE — G2211 COMPLEX E/M VISIT ADD ON: HCPCS | Mod: ,,,

## 2025-03-13 PROCEDURE — 3008F BODY MASS INDEX DOCD: CPT | Mod: CPTII,,,

## 2025-03-13 PROCEDURE — 3078F DIAST BP <80 MM HG: CPT | Mod: CPTII,,,

## 2025-03-13 PROCEDURE — 1159F MED LIST DOCD IN RCRD: CPT | Mod: CPTII,,,

## 2025-03-13 PROCEDURE — 99214 OFFICE O/P EST MOD 30 MIN: CPT | Mod: ,,,

## 2025-03-13 RX ORDER — ALPRAZOLAM 0.25 MG/1
0.25 TABLET ORAL 3 TIMES DAILY PRN
Qty: 90 TABLET | Refills: 1 | Status: SHIPPED | OUTPATIENT
Start: 2025-03-13 | End: 2025-05-12

## 2025-03-13 RX ORDER — SERTRALINE HYDROCHLORIDE 100 MG/1
100 TABLET, FILM COATED ORAL DAILY
Qty: 30 TABLET | Refills: 1 | Status: SHIPPED | OUTPATIENT
Start: 2025-03-13 | End: 2025-05-12

## 2025-03-13 NOTE — PROGRESS NOTES
Family Medicine      Patient ID: [id number]     Chief Complaint: Anxiety (Patient here today for 1 month f/u for anxiety. Patient report improvement in anxiety, but states it is the mornings that are hard. He expressed he does have to  take the xanax often.)    HPI:     Lennox Epps is a 59 y.o. male here today for one-month follow-up visit to discuss medication management for anxiety.    Mr. Epps reports improvement in anxiety symptoms since starting Zoloft, noting better memory and increased appetite. Reports Zoloft is much better tolerated without side effects than any other SSRI tried in the past. His social activities have improved, with increased engagement in weekend activities with his wife, in contrast to his previous lack of motivation. He still has significant anxiety in the mornings, requiring two Xanax upon waking and an additional Xanax at lunchtime due to increased anxiety at work. His anxiety symptoms typically resolve by evening.  Denies associated depression, SI, or HI.    Past Medical History:   Diagnosis Date    Anxiety 4/4/2023    DVT (deep venous thrombosis)     Factor 5 Leiden mutation, heterozygous     GERD (gastroesophageal reflux disease)     Hypertension         Past Surgical History:   Procedure Laterality Date    APPENDECTOMY      BACK SURGERY      CATARACT EXTRACTION Bilateral 2022    COLONOSCOPY      EYE SURGERY  2 years ago    Cateract on both and detached retina    RETINAL DETACHMENT SURGERY  2022    SPINE SURGERY  02/05/1984    3 lumber surgeries over 30 years ago    TONSILLECTOMY      TOTAL KNEE ARTHROPLASTY Right 09/10/2024    Dr Jaun David Hdz        Review of patient's allergies indicates:  No Known Allergies     Patient Care Team:  Aron Sullivan MD as PCP - General (Family Medicine)  José George MD as Consulting Physician (Gastroenterology)  Angelo Reddy MD as Consulting Physician (Otolaryngology)  José Lee MD as Consulting Physician  "(Cardiology)  Corwin Tran Jr., MD as Consulting Physician (Physical Medicine and Rehabilitation)  COSME Hdz MD (Orthopedic Surgery)  Lakia Arrington LPN as Licensed Practical Nurse     Subjective:     Review of Systems  General: negative fever, negative chills, negative fatigue, negative weight gain, negative weight loss  Eyes: negative vision changes, negative redness, negative discharge  ENT: negative ear pain, negative nasal congestion, negative sore throat  Cardiovascular: negative chest pain, negative palpitations, +lower extremity edema  Respiratory: negative cough, negative shortness of breath  Gastrointestinal: negative abdominal pain, negative nausea, negative vomiting, negative diarrhea, negative constipation, negative blood in stool, +increased appetite  Genitourinary: negative dysuria, negative hematuria, negative frequency  Musculoskeletal: negative joint pain, negative muscle pain  Skin: negative rash, negative lesion  Neurological: negative headache, negative dizziness, negative numbness, negative tingling  Psychiatric: +anxiety, negative depression, negative sleep difficulty    Objective:     Visit Vitals  /70 (BP Location: Right arm, Patient Position: Sitting)   Pulse 84   Temp 97.6 °F (36.4 °C)   Resp 18   Ht 5' 11" (1.803 m)   Wt 121.1 kg (267 lb)   SpO2 95%   BMI 37.24 kg/m²     Physical Exam  Constitutional:       General: He is not in acute distress.     Appearance: Normal appearance. He is not toxic-appearing.   Cardiovascular:      Rate and Rhythm: Normal rate and regular rhythm.      Heart sounds: Normal heart sounds.   Pulmonary:      Effort: Pulmonary effort is normal. No respiratory distress.      Breath sounds: Normal breath sounds. No wheezing, rhonchi or rales.   Abdominal:      General: Bowel sounds are normal. There is no distension.      Palpations: Abdomen is soft.      Tenderness: There is no abdominal tenderness.   Musculoskeletal:      Right lower leg: No edema. "      Left lower leg: No edema.   Neurological:      General: No focal deficit present.      Mental Status: He is alert.   Psychiatric:         Attention and Perception: Attention and perception normal.         Mood and Affect: Affect normal. Mood is anxious. Mood is not depressed.         Speech: Speech normal.         Behavior: Behavior normal. Behavior is cooperative.         Thought Content: Thought content normal.         Cognition and Memory: Cognition and memory normal.         Judgment: Judgment normal.       Assessment:       ICD-10-CM ICD-9-CM   1. Anxiety  F41.9 300.00      Plan:     1. Anxiety  Assessment & Plan:  Increase Zoloft to 100 mg by mouth daily  Continue Abilify 15 mg by mouth daily  Continue Xanax 0.25 mg by mouth t.i.d. p.r.n. anxiety ( reviewed)  Reviewed side effects and importance of compliance with medication  Discussed coping skills to help reduce anxiety  Exercise daily  Avoid caffeine, alcohol and stimulants  Encouraged getting 7-8 hours of uninterrupted sleep per night  Discussed benefits of medication not becoming noticeable until up to 6 weeks from start date  Report any symptoms of suicidal or homicidal ideations immediately, if clinic is closed go to nearest emergency room   Return to clinic in 1 month for follow-up or sooner with any concerns.    Orders:  -     ALPRAZolam (XANAX) 0.25 MG tablet; Take 1 tablet (0.25 mg total) by mouth 3 (three) times daily as needed for Anxiety.  Dispense: 90 tablet; Refill: 1  -     sertraline (ZOLOFT) 100 MG tablet; Take 1 tablet (100 mg total) by mouth once daily.  Dispense: 30 tablet; Refill: 1         Follow up in about 1 month (around 4/13/2025) for Follow Up, anxiety. In addition to their scheduled follow up, the patient has also been instructed to follow up on as needed basis.     This note was generated with the assistance of ambient listening technology. Verbal consent was obtained by the patient and accompanying visitor(s) for the  recording of patient appointment to facilitate this note. I attest to having reviewed and edited the generated note for accuracy, though some syntax or spelling errors may persist. Please contact the author of this note for any clarification.       Goldie Hernandez NP

## 2025-03-13 NOTE — ASSESSMENT & PLAN NOTE
Increase Zoloft to 100 mg by mouth daily  Continue Abilify 15 mg by mouth daily  Continue Xanax 0.25 mg by mouth t.i.d. p.r.n. anxiety ( reviewed)  Reviewed side effects and importance of compliance with medication  Discussed coping skills to help reduce anxiety  Exercise daily  Avoid caffeine, alcohol and stimulants  Encouraged getting 7-8 hours of uninterrupted sleep per night  Discussed benefits of medication not becoming noticeable until up to 6 weeks from start date  Report any symptoms of suicidal or homicidal ideations immediately, if clinic is closed go to nearest emergency room   Return to clinic in 1 month for follow-up or sooner with any concerns.

## 2025-04-07 DIAGNOSIS — F41.9 ANXIETY: ICD-10-CM

## 2025-04-07 RX ORDER — SERTRALINE HYDROCHLORIDE 100 MG/1
100 TABLET, FILM COATED ORAL DAILY
Qty: 90 TABLET | Refills: 1 | Status: SHIPPED | OUTPATIENT
Start: 2025-04-07

## 2025-04-11 ENCOUNTER — OFFICE VISIT (OUTPATIENT)
Dept: FAMILY MEDICINE | Facility: CLINIC | Age: 60
End: 2025-04-11
Payer: COMMERCIAL

## 2025-04-11 VITALS
TEMPERATURE: 98 F | SYSTOLIC BLOOD PRESSURE: 128 MMHG | WEIGHT: 259 LBS | HEIGHT: 71 IN | HEART RATE: 78 BPM | RESPIRATION RATE: 18 BRPM | BODY MASS INDEX: 36.26 KG/M2 | DIASTOLIC BLOOD PRESSURE: 72 MMHG | OXYGEN SATURATION: 98 %

## 2025-04-11 DIAGNOSIS — D68.2 FACTOR V DEFICIENCY: ICD-10-CM

## 2025-04-11 DIAGNOSIS — F41.9 ANXIETY: Primary | ICD-10-CM

## 2025-04-11 DIAGNOSIS — F33.9 DEPRESSION, RECURRENT: ICD-10-CM

## 2025-04-11 DIAGNOSIS — R60.0 FLUID RETENTION IN LEGS: ICD-10-CM

## 2025-04-11 DIAGNOSIS — I82.5Z1 CHRONIC DEEP VEIN THROMBOSIS (DVT) OF DISTAL VEIN OF RIGHT LOWER EXTREMITY: ICD-10-CM

## 2025-04-11 NOTE — ASSESSMENT & PLAN NOTE
Continue Zoloft 100 mg by mouth daily + Abilify 15 mg by mouth daily + Xanax 0.25 mg by mouth t.i.d. p.r.n. anxiety  Reviewed side effects and importance of compliance with medication  Discussed coping skills to help reduce anxiety  Exercise daily  Avoid caffeine, alcohol and stimulants  Encouraged getting 7-8 hours of uninterrupted sleep per night  Discussed benefits of medication not becoming noticeable until up to 6 weeks from start date  Report any symptoms of suicidal or homicidal ideations immediately, if clinic is closed go to nearest emergency room   Return to clinic with any concerns.

## 2025-04-11 NOTE — ASSESSMENT & PLAN NOTE
Continue Eliquis 5 mg by mouth b.i.d.  Followed by cardiology (Dr. Lee); keep scheduled follow-ups  Not currently followed by Hematology; previously seen by Dr. Rice  Bleeding precautions  Fall precautions  Monitor  Return to clinic with any concerns

## 2025-04-11 NOTE — PROGRESS NOTES
Family Medicine      Patient ID: [id number]     Chief Complaint: Anxiety (Patient here today for 1 month f/u for anxiety. Patient reports since the increase in Zoloft a lot of improvement. He mentions barely having to take the Xanax.)      HPI:     Lennox Epps is a 59 y.o. male here today for one-month anxiety/depression follow-up.    Mr. Epps reports improvement and positive feelings since the increase in Zoloft dosage to 100mg approximately one-month ago. Tolerating medication well without side effects. Xanax usage has decreased, with no use in the current week, an improvement from previous more frequent use. Mr. Epps confirms the current medication regimen, including Zoloft (Sertraline), Abilify, and Xanax as needed, is effective, particularly at his workplace. Mr. Epps is also taking Eliquis, which he prefers to frequent INR testing with Coumadin. He takes Lasix as needed, with the last dose taken approximately 2 weeks ago, and confirms taking a potassium pill with each Lasix dose; followed by cardiology (Dr. Lee). Mr. Epps reports no new issues or concerns during this visit.    Past Medical History:   Diagnosis Date    Anxiety 4/4/2023    DVT (deep venous thrombosis)     Factor 5 Leiden mutation, heterozygous     GERD (gastroesophageal reflux disease)     Hypertension         Past Surgical History:   Procedure Laterality Date    APPENDECTOMY      BACK SURGERY      CATARACT EXTRACTION Bilateral 2022    COLONOSCOPY      EYE SURGERY  2 years ago    Cateract on both and detached retina    RETINAL DETACHMENT SURGERY  2022    SPINE SURGERY  02/05/1984    3 lumber surgeries over 30 years ago    TONSILLECTOMY      TOTAL KNEE ARTHROPLASTY Right 09/10/2024    Dr Juan David Hdz        Review of patient's allergies indicates:  No Known Allergies     Patient Care Team:  Aron Sullivan MD as PCP - General (Family Medicine)  José George MD as Consulting Physician (Gastroenterology)  Angelo Reddy MD as  "Consulting Physician (Otolaryngology)  José Lee MD as Consulting Physician (Cardiology)  Corwin Tran Jr., MD as Consulting Physician (Physical Medicine and Rehabilitation)  COSME Hdz MD (Orthopedic Surgery)  Lakia Arrington LPN as Licensed Practical Nurse     Subjective:     Review of Systems  General: negative fever, negative chills, negative fatigue, negative weight gain, negative weight loss  Eyes: negative vision changes, negative redness, negative discharge  ENT: negative ear pain, negative nasal congestion, negative sore throat  Cardiovascular: negative chest pain, negative palpitations, +lower extremity edema, negative orthopnea  Respiratory: negative cough, negative shortness of breath, negative wheezing  Gastrointestinal: negative abdominal pain, negative nausea, negative vomiting, negative diarrhea, negative constipation, negative blood in stool  Genitourinary: negative dysuria, negative hematuria, negative frequency  Musculoskeletal: negative joint pain, negative muscle pain  Skin: negative rash, negative lesion  Neurological: negative headache, negative dizziness, negative numbness, negative tingling  Psychiatric: negative anxiety, negative depression, negative sleep difficulty    Objective:     Visit Vitals  /72 (BP Location: Right arm, Patient Position: Sitting)   Pulse 78   Temp 97.5 °F (36.4 °C)   Resp 18   Ht 5' 11" (1.803 m)   Wt 117.5 kg (259 lb)   SpO2 98%   BMI 36.12 kg/m²       Physical Exam  Constitutional:       General: He is not in acute distress.     Appearance: Normal appearance. He is not ill-appearing.   Cardiovascular:      Rate and Rhythm: Normal rate and regular rhythm.      Heart sounds: Normal heart sounds.   Pulmonary:      Effort: Pulmonary effort is normal. No respiratory distress.      Breath sounds: Normal breath sounds. No wheezing, rhonchi or rales.   Abdominal:      General: Bowel sounds are normal. There is no distension.      Palpations: Abdomen " is soft.      Tenderness: There is no abdominal tenderness. There is no guarding.   Musculoskeletal:      Right lower le+ Edema present.      Left lower le+ Edema present.   Skin:     General: Skin is warm and dry.      Findings: No bruising.   Neurological:      General: No focal deficit present.      Mental Status: He is alert.   Psychiatric:         Mood and Affect: Mood normal.         Behavior: Behavior normal.         Thought Content: Thought content normal.         Judgment: Judgment normal.       Assessment:       ICD-10-CM ICD-9-CM   1. Anxiety  F41.9 300.00   2. 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 Chronic deep vein thrombosis (DVT) of distal vein of right lower extremity  I82.5Z1 453.52   5. Fluid retention in legs  R60.0 782.3      Plan:     1. Anxiety  Assessment & Plan:  Continue Zoloft 100 mg by mouth daily + Abilify 15 mg by mouth daily + Xanax 0.25 mg by mouth t.i.d. p.r.n. anxiety  Reviewed side effects and importance of compliance with medication  Discussed coping skills to help reduce anxiety  Exercise daily  Avoid caffeine, alcohol and stimulants  Encouraged getting 7-8 hours of uninterrupted sleep per night  Discussed benefits of medication not becoming noticeable until up to 6 weeks from start date  Report any symptoms of suicidal or homicidal ideations immediately, if clinic is closed go to nearest emergency room   Return to clinic with any concerns.      2. Depression, recurrent  Assessment & Plan:  Continue Zoloft 100 mg by mouth daily + Abilify 15 mg by mouth daily  Educated patient on the risks of serotonin based medications such as serotonin modulators and SSRIs/SNRIs including common side effects of nausea, GI upset, headache dizziness as well as the rare risk for worsening symptoms of depression including development of suicidal thoughts or ideations, and serotonin syndrome.   Discussed benefits of medication not becoming noticeable  until up to 6 weeks from start date.   Exercise daily. Get sunlight daily.  Practice positive phrases and repeat throughout the day, along with yoga and relaxation techniques.  Establish good social support, make changes to reduce stress.  Reports any symptoms of suicidal/homicidal ideations or self harm immediately, if clinic is closed go to nearest emergency room.  Return to clinic with any concerns.      3. Factor V deficiency  Assessment & Plan:  Continue Eliquis 5 mg by mouth b.i.d.  Followed by cardiology (Dr. Lee); keep scheduled follow-ups  Not currently followed by Hematology; previously seen by Dr. Rice  Bleeding precautions  Fall precautions  Monitor  Return to clinic with any concerns      4. Chronic deep vein thrombosis (DVT) of distal vein of right lower extremity  Assessment & Plan:  Continue Eliquis 5 mg by mouth b.i.d.  Followed by cardiology (Dr. Lee); keep scheduled follow-ups  Not currently followed by Hematology; previously seen by Dr. Rice  Bleeding precautions  Fall precautions  Monitor  Return to clinic with any concerns      5. Fluid retention in legs  Assessment & Plan:  Continue Lasix 20 mg by mouth daily p.r.n. as prescribed by Cardiology  Continue potassium 10 mEq by mouth daily p.r.n.; take with Lasix.  Notify the clinic if you gain 3 or more pounds in one day or 5 or more pounds in one week.  Stressed importance of daily weights  Encouraged adherence of low sodium/low fat diet  Limit alcohol intake if you have more than 1 drink a day (for women) or 2 drinks a day (for men).  Exercise regimen to include 150 minutes of physical activity per week as tolerated  Report to ER for chest pain, SOB, abdominal distention, or significant edema to lower extremities.  Continue following up with Cardiology - Dr. Lee            Follow up for Keep Scheduled, Follow Up. In addition to their scheduled follow up, the patient has also been instructed to follow up on as needed basis.     This  note was generated with the assistance of ambient listening technology. Verbal consent was obtained by the patient and accompanying visitor(s) for the recording of patient appointment to facilitate this note. I attest to having reviewed and edited the generated note for accuracy, though some syntax or spelling errors may persist. Please contact the author of this note for any clarification.       Goldie Hernandez NP

## 2025-04-11 NOTE — ASSESSMENT & PLAN NOTE
Continue Lasix 20 mg by mouth daily p.r.n. as prescribed by Cardiology  Continue potassium 10 mEq by mouth daily p.r.n.; take with Lasix.  Notify the clinic if you gain 3 or more pounds in one day or 5 or more pounds in one week.  Stressed importance of daily weights  Encouraged adherence of low sodium/low fat diet  Limit alcohol intake if you have more than 1 drink a day (for women) or 2 drinks a day (for men).  Exercise regimen to include 150 minutes of physical activity per week as tolerated  Report to ER for chest pain, SOB, abdominal distention, or significant edema to lower extremities.  Continue following up with Cardiology - Dr. Lee

## 2025-04-11 NOTE — ASSESSMENT & PLAN NOTE
Continue Zoloft 100 mg by mouth daily + Abilify 15 mg by mouth daily  Educated patient on the risks of serotonin based medications such as serotonin modulators and SSRIs/SNRIs including common side effects of nausea, GI upset, headache dizziness as well as the rare risk for worsening symptoms of depression including development of suicidal thoughts or ideations, and serotonin syndrome.   Discussed benefits of medication not becoming noticeable until up to 6 weeks from start date.   Exercise daily. Get sunlight daily.  Practice positive phrases and repeat throughout the day, along with yoga and relaxation techniques.  Establish good social support, make changes to reduce stress.  Reports any symptoms of suicidal/homicidal ideations or self harm immediately, if clinic is closed go to nearest emergency room.  Return to clinic with any concerns.

## 2025-05-02 ENCOUNTER — HOSPITAL ENCOUNTER (EMERGENCY)
Facility: HOSPITAL | Age: 60
Discharge: HOME | End: 2025-05-02
Attending: FAMILY MEDICINE
Payer: COMMERCIAL

## 2025-05-02 VITALS
WEIGHT: 270 LBS | HEART RATE: 84 BPM | DIASTOLIC BLOOD PRESSURE: 74 MMHG | BODY MASS INDEX: 37.8 KG/M2 | HEIGHT: 71 IN | SYSTOLIC BLOOD PRESSURE: 110 MMHG | OXYGEN SATURATION: 98 % | RESPIRATION RATE: 16 BRPM | TEMPERATURE: 97 F

## 2025-05-02 DIAGNOSIS — M54.50 ACUTE BILATERAL LOW BACK PAIN WITHOUT SCIATICA: Primary | ICD-10-CM

## 2025-05-02 DIAGNOSIS — R52 PAIN: ICD-10-CM

## 2025-05-02 PROCEDURE — 25000003 PHARM REV CODE 250: Performed by: FAMILY MEDICINE

## 2025-05-02 PROCEDURE — 99284 EMERGENCY DEPT VISIT MOD MDM: CPT | Mod: 25

## 2025-05-02 PROCEDURE — 96372 THER/PROPH/DIAG INJ SC/IM: CPT | Performed by: FAMILY MEDICINE

## 2025-05-02 PROCEDURE — 63600175 PHARM REV CODE 636 W HCPCS: Performed by: FAMILY MEDICINE

## 2025-05-02 RX ORDER — MORPHINE SULFATE 2 MG/ML
4 INJECTION, SOLUTION INTRAMUSCULAR; INTRAVENOUS
Status: COMPLETED | OUTPATIENT
Start: 2025-05-02 | End: 2025-05-02

## 2025-05-02 RX ORDER — ONDANSETRON 4 MG/1
4 TABLET, ORALLY DISINTEGRATING ORAL
Status: COMPLETED | OUTPATIENT
Start: 2025-05-02 | End: 2025-05-02

## 2025-05-02 RX ORDER — DEXAMETHASONE SODIUM PHOSPHATE 4 MG/ML
8 INJECTION, SOLUTION INTRA-ARTICULAR; INTRALESIONAL; INTRAMUSCULAR; INTRAVENOUS; SOFT TISSUE
Status: COMPLETED | OUTPATIENT
Start: 2025-05-02 | End: 2025-05-02

## 2025-05-02 RX ORDER — TRAMADOL HYDROCHLORIDE 50 MG/1
50 TABLET ORAL EVERY 6 HOURS PRN
Qty: 12 TABLET | Refills: 0 | Status: SHIPPED | OUTPATIENT
Start: 2025-05-02

## 2025-05-02 RX ORDER — CYCLOBENZAPRINE HCL 10 MG
5 TABLET ORAL 2 TIMES DAILY PRN
Qty: 5 TABLET | Refills: 0 | Status: SHIPPED | OUTPATIENT
Start: 2025-05-02 | End: 2025-05-07

## 2025-05-02 RX ADMIN — DEXAMETHASONE SODIUM PHOSPHATE 8 MG: 4 INJECTION, SOLUTION INTRA-ARTICULAR; INTRALESIONAL; INTRAMUSCULAR; INTRAVENOUS; SOFT TISSUE at 09:05

## 2025-05-02 RX ADMIN — MORPHINE SULFATE 4 MG: 2 INJECTION, SOLUTION INTRAMUSCULAR; INTRAVENOUS at 09:05

## 2025-05-02 RX ADMIN — ONDANSETRON 4 MG: 4 TABLET, ORALLY DISINTEGRATING ORAL at 09:05

## 2025-05-02 NOTE — ED PROVIDER NOTES
Encounter Date: 5/2/2025       History     Chief Complaint   Patient presents with    Back Pain     Low back pain x 1 week.  Back spasms and left hip pain starting yesterday.       This patient is a 59-year-old male who is brought in by ambulance for severe lower back pain.  Patient has a history of chronic back pain and has had 4 surgeries on his lumbar spine.  States that he has not had any traumatic experience in the past 48 hours and has been hurting her proximally 24 hours.    The history is provided by the patient.     Review of patient's allergies indicates:  No Known Allergies  Past Medical History:   Diagnosis Date    Anxiety 4/4/2023    DVT (deep venous thrombosis)     Factor 5 Leiden mutation, heterozygous     GERD (gastroesophageal reflux disease)     Hypertension      Past Surgical History:   Procedure Laterality Date    APPENDECTOMY      BACK SURGERY      CATARACT EXTRACTION Bilateral 2022    COLONOSCOPY      EYE SURGERY  2 years ago    Cateract on both and detached retina    RETINAL DETACHMENT SURGERY  2022    SPINE SURGERY  02/05/1984    3 lumber surgeries over 30 years ago    TONSILLECTOMY      TOTAL KNEE ARTHROPLASTY Right 09/10/2024    Dr Juan David Hdz     Family History   Problem Relation Name Age of Onset    Stroke Mother Darlene Epps     Heart disease Father Edison Epps     Hypertension Sister Gege Cobian     Diabetes Sister [facility]     Diabetes Sister Gege Ariel      Social History[1]  Review of Systems   Constitutional: Negative.    HENT: Negative.     Respiratory: Negative.     Cardiovascular: Negative.    Endocrine: Negative.    Musculoskeletal:  Positive for back pain.   Neurological: Negative.    Psychiatric/Behavioral: Negative.     All other systems reviewed and are negative.      Physical Exam     Initial Vitals [05/02/25 0933]   BP Pulse Resp Temp SpO2   (!) 152/91 85 18 96.8 °F (36 °C) 96 %      MAP       --         Physical Exam    Nursing note and vitals  reviewed.  Constitutional: He appears well-developed and well-nourished.   HENT:   Head: Normocephalic.   Eyes: Pupils are equal, round, and reactive to light.   Neck:   Normal range of motion.  Cardiovascular:  Normal rate and regular rhythm.           Pulmonary/Chest: Breath sounds normal.   Abdominal: Abdomen is soft. Bowel sounds are normal.   Musculoskeletal:         General: Normal range of motion.      Cervical back: Normal range of motion.        Back:       Comments: Severe pain in the lower back which is not radiating to the legs     Neurological: He is alert and oriented to person, place, and time. GCS score is 15. GCS eye subscore is 4. GCS verbal subscore is 5. GCS motor subscore is 6.   Skin: Skin is warm and dry.   Psychiatric: He has a normal mood and affect.         ED Course   Procedures  Labs Reviewed - No data to display       Imaging Results              X-Ray Thoracic Spine AP Lateral (Final result)  Result time 05/02/25 10:22:23      Final result by Noah Coelho MD (05/02/25 10:22:23)                   Impression:      No acute radiographic findings appreciated.      Electronically signed by: Noah Coelho  Date:    05/02/2025  Time:    10:22               Narrative:    EXAMINATION:  XR THORACIC SPINE AP LATERAL    CLINICAL HISTORY:  Pain, unspecified    TECHNIQUE:  AP and lateral radiographs of the thoracic spine    COMPARISON:  Chest radiographs 03/04/2024    FINDINGS:  No acute appearing areas of vertebral body height loss appreciated.  No significant listhesis.  Mild thoracic degenerative changes.                                       X-Ray Lumbar Spine Ap And Lateral (Final result)  Result time 05/02/25 10:16:35      Final result by Noah Coelho MD (05/02/25 10:16:35)                   Impression:      Advanced degenerative changes.  No acute radiographic findings appreciated.      Electronically signed by: Noah Coelho  Date:    05/02/2025  Time:    10:16                Narrative:    EXAMINATION:  XR LUMBAR SPINE AP AND LATERAL    CLINICAL HISTORY:  pain;    TECHNIQUE:  Frontal and lateral radiographs of the lumbar spine    COMPARISON:  [facility] 01/12/2024    FINDINGS:  For the purposes of this report, there are 5 lumbar vertebral bodies. Transpedicular screws and posterior connecting plates at L4 and L5.  No acute areas of vertebral body height loss identified.  No significant listhesis.  Mild right convex lumbar scoliosis with multilevel degenerative changes.                                       Medications   dexAMETHasone injection 8 mg (8 mg Intramuscular Given 5/2/25 0938)   morphine injection 4 mg (4 mg Intramuscular Given 5/2/25 0948)   ondansetron disintegrating tablet 4 mg (4 mg Oral Given 5/2/25 0948)     Medical Decision Making  This patient is a 59-year-old male who has a history of chronic back pain but today could not get out of bed easily.  Differential diagnosis:  Lower back pain, chronic pain,    Amount and/or Complexity of Data Reviewed  Radiology: ordered.     Details: Thoracic spine x-ray shows no acute radiographic findings, x-ray of the lumbar spine shows advanced degenerative changes with no acute radiographic findings    Risk  Prescription drug management.                                      Clinical Impression:  Final diagnoses:  [R52] Pain  [M54.50] Acute bilateral low back pain without sciatica (Primary)          ED Disposition Condition    Discharge Stable          ED Prescriptions       Medication Sig Dispense Start Date End Date Auth. Provider    cyclobenzaprine (FLEXERIL) 10 MG tablet Take 0.5 tablets (5 mg total) by mouth 2 (two) times daily as needed for Muscle spasms. 5 tablet 5/2/2025 5/7/2025 Dillon Luna MD    traMADoL (ULTRAM) 50 mg tablet Take 1 tablet (50 mg total) by mouth every 6 (six) hours as needed for Pain. 12 tablet 5/2/2025 -- Dillon Luna MD          Follow-up Information       Follow up With  Specialties Details Why Contact Info    Aron Sullivan MD Family Medicine Schedule an appointment as soon as possible for a visit in 1 week  [address]  [place] [address]  [phone]                 [1]   Social History  Tobacco Use    Smoking status: Former     Current packs/day: 0.00     Average packs/day: 1 pack/day for 16.0 years (16.0 ttl pk-yrs)     Types: Cigarettes     Start date: 1979     Quit date: 1995     Years since quittin.3    Smokeless tobacco: Never    Tobacco comments:     I dont remember   Substance Use Topics    Alcohol use: Not Currently    Drug use: Never        Dillon Luna MD  [phone]

## 2025-05-19 DIAGNOSIS — F41.9 ANXIETY: ICD-10-CM

## 2025-05-19 RX ORDER — ALPRAZOLAM 0.25 MG/1
0.25 TABLET ORAL 3 TIMES DAILY PRN
Qty: 90 TABLET | Refills: 1 | Status: SHIPPED | OUTPATIENT
Start: 2025-05-19 | End: 2025-07-18

## 2025-05-22 ENCOUNTER — OFFICE VISIT (OUTPATIENT)
Dept: FAMILY MEDICINE | Facility: CLINIC | Age: 60
End: 2025-05-22
Payer: COMMERCIAL

## 2025-05-22 VITALS
TEMPERATURE: 97 F | OXYGEN SATURATION: 97 % | SYSTOLIC BLOOD PRESSURE: 104 MMHG | DIASTOLIC BLOOD PRESSURE: 76 MMHG | RESPIRATION RATE: 18 BRPM | BODY MASS INDEX: 35.7 KG/M2 | HEIGHT: 71 IN | WEIGHT: 255 LBS | HEART RATE: 118 BPM

## 2025-05-22 DIAGNOSIS — F33.9 DEPRESSION, RECURRENT: ICD-10-CM

## 2025-05-22 DIAGNOSIS — M54.16 LUMBAR RADICULOPATHY: ICD-10-CM

## 2025-05-22 DIAGNOSIS — I10 HYPERTENSION, UNSPECIFIED TYPE: Primary | ICD-10-CM

## 2025-05-22 RX ORDER — ACETAMINOPHEN AND CODEINE PHOSPHATE 300; 30 MG/1; MG/1
1 TABLET ORAL EVERY 6 HOURS PRN
COMMUNITY
Start: 2025-05-16

## 2025-05-22 RX ORDER — TIZANIDINE 4 MG/1
4 TABLET ORAL NIGHTLY PRN
COMMUNITY
Start: 2025-05-07

## 2025-05-22 NOTE — ASSESSMENT & PLAN NOTE
Controlled  Continue current medication (amlodipine/Lasix/losartan)  Return to clinic with any concerns

## 2025-05-22 NOTE — PROGRESS NOTES
"Subjective:     Patient ID: Lennox Epps is a 59 y.o. male.    Chief Complaint: 5 month f/u        History of Present Illness    CHIEF COMPLAINT:  Patient presents today for routine follow up    BACK PAIN:  He reports back pain primarily localized in the hip area, which is affecting his ability to work. He is scheduled for a lumbar injection tomorrow with Dr. Valdes. He denies swelling in the affected area.    PSYCHIATRIC:  He continues sertraline 100 mg and abilify 15 mg for depression and anxiety with good effect.    CARDIOLOGY:  He follows with Dr. Lee, last seen this month.      ROS:  Cardiovascular: no chest pain, no lower extremity edema  Musculoskeletal: +joint pain  Psychiatric: +anxiety, +depression            Review of Systems    Objective:     /76   Pulse (!) 118   Temp 97 °F (36.1 °C) (Temporal)   Resp 18   Ht 5' 10.87" (1.8 m)   Wt 115.7 kg (255 lb)   SpO2 97%   BMI 35.70 kg/m²    Physical Exam  Constitutional:       Appearance: Normal appearance.   Cardiovascular:      Rate and Rhythm: Normal rate and regular rhythm.      Heart sounds: Normal heart sounds.   Pulmonary:      Effort: Pulmonary effort is normal.      Breath sounds: Normal breath sounds.   Neurological:      Mental Status: He is alert.   Psychiatric:         Mood and Affect: Mood normal.         Behavior: Behavior normal.         Thought Content: Thought content normal.         Judgment: Judgment normal.             Assessment:     Problem List Items Addressed This Visit          Neuro    Lumbar radiculopathy    Current Assessment & Plan   Keep scheduled appointment with Dr Tran for injection            Psychiatric    Depression, recurrent    Current Assessment & Plan   Controlled  Continue current medication (Zoloft/Abilify)  Return to clinic with any concerns            Cardiac/Vascular    Hypertension - Primary    Current Assessment & Plan   Controlled  Continue current medication " (amlodipine/Lasix/losartan)  Return to clinic with any concerns             Plan:   1. Hypertension, unspecified type  Assessment & Plan:  Controlled  Continue current medication (amlodipine/Lasix/losartan)  Return to clinic with any concerns      2. Depression, recurrent  Assessment & Plan:  Controlled  Continue current medication (Zoloft/Abilify)  Return to clinic with any concerns      3. Lumbar radiculopathy  Assessment & Plan:  Keep scheduled appointment with Dr Tran for injection                     This note was generated with the assistance of ambient listening technology. Verbal consent was obtained by the patient and accompanying visitor(s) for the recording of patient appointment to facilitate this note. I attest to having reviewed and edited the generated note for accuracy, though some syntax or spelling errors may persist. Please contact the author of this note for any clarification.

## 2025-06-10 DIAGNOSIS — D68.2 FACTOR V DEFICIENCY: ICD-10-CM

## 2025-06-10 DIAGNOSIS — I82.5Z1 CHRONIC DEEP VEIN THROMBOSIS (DVT) OF DISTAL VEIN OF RIGHT LOWER EXTREMITY: ICD-10-CM

## 2025-07-26 DIAGNOSIS — F41.9 ANXIETY: ICD-10-CM

## 2025-07-28 RX ORDER — ALPRAZOLAM 0.25 MG/1
0.25 TABLET ORAL 3 TIMES DAILY
Qty: 90 TABLET | Refills: 1 | Status: SHIPPED | OUTPATIENT
Start: 2025-07-28

## 2025-08-20 DIAGNOSIS — F33.9 DEPRESSION, RECURRENT: ICD-10-CM

## 2025-08-20 RX ORDER — ARIPIPRAZOLE 15 MG/1
15 TABLET ORAL
Qty: 30 TABLET | Refills: 1 | Status: SHIPPED | OUTPATIENT
Start: 2025-08-20

## 2025-09-01 DIAGNOSIS — I10 HYPERTENSION, UNSPECIFIED TYPE: ICD-10-CM

## 2025-09-02 RX ORDER — AMLODIPINE BESYLATE 10 MG/1
10 TABLET ORAL EVERY MORNING
Qty: 90 TABLET | Refills: 1 | Status: SHIPPED | OUTPATIENT
Start: 2025-09-02 | End: 2025-12-31